# Patient Record
Sex: FEMALE | Race: WHITE | NOT HISPANIC OR LATINO | Employment: FULL TIME | URBAN - METROPOLITAN AREA
[De-identification: names, ages, dates, MRNs, and addresses within clinical notes are randomized per-mention and may not be internally consistent; named-entity substitution may affect disease eponyms.]

---

## 2018-04-20 LAB
ALBUMIN (HISTORICAL): < 0.7 MG/DL
ALBUMIN SERPL BCP-MCNC: 4.2 G/DL (ref 3.5–5.7)
ALP SERPL-CCNC: 73 IU/L (ref 40–150)
ALT SERPL W P-5'-P-CCNC: 25 IU/L (ref 0–50)
ANION GAP SERPL CALCULATED.3IONS-SCNC: 10.5 MM/L
AST SERPL W P-5'-P-CCNC: 17 U/L (ref 7–26)
BASOPHILS # BLD AUTO: 0 X3/UL (ref 0–0.3)
BASOPHILS # BLD AUTO: 0.6 % (ref 0–2)
BILIRUB SERPL-MCNC: 0.8 MG/DL (ref 0.3–1)
BUN SERPL-MCNC: 14 MG/DL (ref 7–25)
CALCIUM SERPL-MCNC: 9.3 MG/DL (ref 8.6–10.5)
CHLORIDE SERPL-SCNC: 108 MM/L (ref 98–107)
CHOLEST SERPL-MCNC: 129 MG/DL (ref 0–200)
CO2 SERPL-SCNC: 25 MM/L (ref 21–31)
CREAT SERPL-MCNC: 0.96 MG/DL (ref 0.6–1.2)
DEPRECATED RDW RBC AUTO: 12.8 % (ref 11.5–14.5)
EGFR (HISTORICAL): > 60 GFR
EGFR AFRICAN AMERICAN (HISTORICAL): > 60 GFR
EOSINOPHIL # BLD AUTO: 0.1 X3/UL (ref 0–0.5)
EOSINOPHIL NFR BLD AUTO: 1.9 % (ref 0–5)
EST. AVERAGE GLUCOSE BLD GHB EST-MCNC: 133 MG/DL
GLUCOSE (HISTORICAL): 107 MG/DL (ref 65–99)
HBA1C MFR BLD HPLC: 6.3 % (ref 4–6.2)
HCT VFR BLD AUTO: 41.9 % (ref 37–47)
HDLC SERPL-MCNC: 45 MG/DL (ref 40–60)
HGB BLD-MCNC: 13.9 G/DL (ref 12–16)
LDLC SERPL CALC-MCNC: 73.4 MG/DL (ref 75–193)
LYMPHOCYTES # BLD AUTO: 1.4 X3/UL (ref 1.2–4.2)
LYMPHOCYTES NFR BLD AUTO: 20.5 % (ref 20.5–51.1)
MCH RBC QN AUTO: 29.3 PG (ref 26–34)
MCHC RBC AUTO-ENTMCNC: 33.1 G/DL (ref 31–36)
MCV RBC AUTO: 88.3 FL (ref 81–99)
MONOCYTES # BLD AUTO: 0.4 X3/UL (ref 0–1)
MONOCYTES NFR BLD AUTO: 5.8 % (ref 1.7–12)
NEUTROPHILS # BLD AUTO: 4.7 X3/UL (ref 1.4–6.5)
NEUTS SEG NFR BLD AUTO: 71.2 % (ref 42.2–75.2)
OSMOLALITY, SERUM (HISTORICAL): 278 MOSM (ref 262–291)
PLATELET # BLD AUTO: 257 X3/UL (ref 130–400)
PMV BLD AUTO: 8.5 FL (ref 8.6–11.7)
POTASSIUM SERPL-SCNC: 4.5 MM/L (ref 3.5–5.5)
RBC # BLD AUTO: 4.74 X6/UL (ref 3.9–5.2)
SODIUM SERPL-SCNC: 139 MM/L (ref 134–143)
TOTAL PROTEIN (HISTORICAL): 6.7 G/DL (ref 6.4–8.9)
TRIGL SERPL-MCNC: 53 MG/DL (ref 44–166)
TSH SERPL DL<=0.05 MIU/L-ACNC: 2.39 UIU/M (ref 0.45–5.33)
VLDL CHOLESTEROL (HISTORICAL): 11 MG/DL (ref 5–51)
WBC # BLD AUTO: 6.6 X3/UL (ref 4.8–10.8)

## 2022-03-16 ENCOUNTER — APPOINTMENT (EMERGENCY)
Dept: CT IMAGING | Facility: HOSPITAL | Age: 37
DRG: 189 | End: 2022-03-16
Payer: OTHER MISCELLANEOUS

## 2022-03-16 ENCOUNTER — HOSPITAL ENCOUNTER (INPATIENT)
Facility: HOSPITAL | Age: 37
LOS: 1 days | Discharge: LEFT BEFORE DISCHARGE | DRG: 189 | End: 2022-03-16
Attending: EMERGENCY MEDICINE | Admitting: SURGERY
Payer: OTHER MISCELLANEOUS

## 2022-03-16 VITALS
RESPIRATION RATE: 16 BRPM | OXYGEN SATURATION: 98 % | DIASTOLIC BLOOD PRESSURE: 74 MMHG | SYSTOLIC BLOOD PRESSURE: 127 MMHG | HEART RATE: 86 BPM | TEMPERATURE: 97.3 F

## 2022-03-16 DIAGNOSIS — K43.9 VENTRAL HERNIA: ICD-10-CM

## 2022-03-16 DIAGNOSIS — R10.9 ABDOMINAL PAIN: Primary | ICD-10-CM

## 2022-03-16 DIAGNOSIS — K43.9 VENTRAL HERNIA WITHOUT OBSTRUCTION OR GANGRENE: ICD-10-CM

## 2022-03-16 LAB
ALBUMIN SERPL BCP-MCNC: 4.2 G/DL (ref 3.5–5)
ALP SERPL-CCNC: 86 U/L (ref 34–104)
ALT SERPL W P-5'-P-CCNC: 29 U/L (ref 7–52)
ANION GAP SERPL CALCULATED.3IONS-SCNC: 5 MMOL/L (ref 4–13)
AST SERPL W P-5'-P-CCNC: 20 U/L (ref 13–39)
BASOPHILS # BLD AUTO: 0.04 THOUSANDS/ΜL (ref 0–0.1)
BASOPHILS NFR BLD AUTO: 0 % (ref 0–1)
BILIRUB SERPL-MCNC: 0.93 MG/DL (ref 0.2–1)
BILIRUB UR QL STRIP: NEGATIVE
BUN SERPL-MCNC: 13 MG/DL (ref 5–25)
CALCIUM SERPL-MCNC: 9.3 MG/DL (ref 8.4–10.2)
CHLORIDE SERPL-SCNC: 102 MMOL/L (ref 96–108)
CLARITY UR: CLEAR
CO2 SERPL-SCNC: 32 MMOL/L (ref 21–32)
COLOR UR: YELLOW
CREAT SERPL-MCNC: 1.01 MG/DL (ref 0.6–1.3)
EOSINOPHIL # BLD AUTO: 0.22 THOUSAND/ΜL (ref 0–0.61)
EOSINOPHIL NFR BLD AUTO: 2 % (ref 0–6)
ERYTHROCYTE [DISTWIDTH] IN BLOOD BY AUTOMATED COUNT: 11.7 % (ref 11.6–15.1)
GFR SERPL CREATININE-BSD FRML MDRD: 70 ML/MIN/1.73SQ M
GLUCOSE SERPL-MCNC: 96 MG/DL (ref 65–140)
GLUCOSE UR STRIP-MCNC: NEGATIVE MG/DL
HCG SERPL QL: NEGATIVE
HCT VFR BLD AUTO: 42.2 % (ref 34.8–46.1)
HGB BLD-MCNC: 13.9 G/DL (ref 11.5–15.4)
HGB UR QL STRIP.AUTO: NEGATIVE
IMM GRANULOCYTES # BLD AUTO: 0.02 THOUSAND/UL (ref 0–0.2)
IMM GRANULOCYTES NFR BLD AUTO: 0 % (ref 0–2)
KETONES UR STRIP-MCNC: NEGATIVE MG/DL
LEUKOCYTE ESTERASE UR QL STRIP: NEGATIVE
LIPASE SERPL-CCNC: 11 U/L (ref 11–82)
LYMPHOCYTES # BLD AUTO: 1.82 THOUSANDS/ΜL (ref 0.6–4.47)
LYMPHOCYTES NFR BLD AUTO: 19 % (ref 14–44)
MCH RBC QN AUTO: 29.4 PG (ref 26.8–34.3)
MCHC RBC AUTO-ENTMCNC: 32.9 G/DL (ref 31.4–37.4)
MCV RBC AUTO: 89 FL (ref 82–98)
MONOCYTES # BLD AUTO: 0.64 THOUSAND/ΜL (ref 0.17–1.22)
MONOCYTES NFR BLD AUTO: 7 % (ref 4–12)
NEUTROPHILS # BLD AUTO: 6.68 THOUSANDS/ΜL (ref 1.85–7.62)
NEUTS SEG NFR BLD AUTO: 72 % (ref 43–75)
NITRITE UR QL STRIP: NEGATIVE
NRBC BLD AUTO-RTO: 0 /100 WBCS
PH UR STRIP.AUTO: 7 [PH]
PLATELET # BLD AUTO: 217 THOUSANDS/UL (ref 149–390)
PMV BLD AUTO: 9.3 FL (ref 8.9–12.7)
POTASSIUM SERPL-SCNC: 3.5 MMOL/L (ref 3.5–5.3)
PROT SERPL-MCNC: 6.7 G/DL (ref 6.4–8.4)
PROT UR STRIP-MCNC: NEGATIVE MG/DL
RBC # BLD AUTO: 4.72 MILLION/UL (ref 3.81–5.12)
SODIUM SERPL-SCNC: 139 MMOL/L (ref 135–147)
SP GR UR STRIP.AUTO: 1.02 (ref 1–1.03)
UROBILINOGEN UR QL STRIP.AUTO: 0.2 E.U./DL
WBC # BLD AUTO: 9.42 THOUSAND/UL (ref 4.31–10.16)

## 2022-03-16 PROCEDURE — 74177 CT ABD & PELVIS W/CONTRAST: CPT

## 2022-03-16 PROCEDURE — 81003 URINALYSIS AUTO W/O SCOPE: CPT | Performed by: EMERGENCY MEDICINE

## 2022-03-16 PROCEDURE — G1004 CDSM NDSC: HCPCS

## 2022-03-16 PROCEDURE — 84703 CHORIONIC GONADOTROPIN ASSAY: CPT | Performed by: EMERGENCY MEDICINE

## 2022-03-16 PROCEDURE — 99283 EMERGENCY DEPT VISIT LOW MDM: CPT

## 2022-03-16 PROCEDURE — 99285 EMERGENCY DEPT VISIT HI MDM: CPT | Performed by: EMERGENCY MEDICINE

## 2022-03-16 PROCEDURE — 85025 COMPLETE CBC W/AUTO DIFF WBC: CPT | Performed by: EMERGENCY MEDICINE

## 2022-03-16 PROCEDURE — 36415 COLL VENOUS BLD VENIPUNCTURE: CPT | Performed by: EMERGENCY MEDICINE

## 2022-03-16 PROCEDURE — 83690 ASSAY OF LIPASE: CPT | Performed by: EMERGENCY MEDICINE

## 2022-03-16 PROCEDURE — 96374 THER/PROPH/DIAG INJ IV PUSH: CPT

## 2022-03-16 PROCEDURE — 99285 EMERGENCY DEPT VISIT HI MDM: CPT

## 2022-03-16 PROCEDURE — 80053 COMPREHEN METABOLIC PANEL: CPT | Performed by: EMERGENCY MEDICINE

## 2022-03-16 RX ORDER — ONDANSETRON 2 MG/ML
4 INJECTION INTRAMUSCULAR; INTRAVENOUS EVERY 6 HOURS PRN
Status: CANCELLED | OUTPATIENT
Start: 2022-03-16

## 2022-03-16 RX ORDER — ACETAMINOPHEN 325 MG/1
650 TABLET ORAL EVERY 6 HOURS PRN
Status: CANCELLED | OUTPATIENT
Start: 2022-03-16

## 2022-03-16 RX ORDER — HEPARIN SODIUM 5000 [USP'U]/ML
5000 INJECTION, SOLUTION INTRAVENOUS; SUBCUTANEOUS EVERY 8 HOURS SCHEDULED
Status: CANCELLED | OUTPATIENT
Start: 2022-03-16

## 2022-03-16 RX ORDER — NICOTINE 21 MG/24HR
1 PATCH, TRANSDERMAL 24 HOURS TRANSDERMAL DAILY
Status: CANCELLED | OUTPATIENT
Start: 2022-03-16

## 2022-03-16 RX ORDER — KETOROLAC TROMETHAMINE 30 MG/ML
15 INJECTION, SOLUTION INTRAMUSCULAR; INTRAVENOUS EVERY 6 HOURS SCHEDULED
Status: CANCELLED | OUTPATIENT
Start: 2022-03-16 | End: 2022-03-21

## 2022-03-16 RX ORDER — SODIUM CHLORIDE, SODIUM LACTATE, POTASSIUM CHLORIDE, CALCIUM CHLORIDE 600; 310; 30; 20 MG/100ML; MG/100ML; MG/100ML; MG/100ML
125 INJECTION, SOLUTION INTRAVENOUS CONTINUOUS
Status: CANCELLED | OUTPATIENT
Start: 2022-03-16

## 2022-03-16 RX ADMIN — MORPHINE SULFATE 2 MG: 2 INJECTION, SOLUTION INTRAMUSCULAR; INTRAVENOUS at 08:20

## 2022-03-16 RX ADMIN — IOHEXOL 100 ML: 350 INJECTION, SOLUTION INTRAVENOUS at 06:33

## 2022-03-16 NOTE — ED PROVIDER NOTES
History  Chief Complaint   Patient presents with    Abdominal Pain     Pt states she pulled her stomach lifting an object and feels pain mid epigastric region  42YEAR-OLD FEMALE  PMH: none    PATIENT IS HERE FOR PERIUMBILICAL ABDOMINAL PAIN    STATES THIS STARTED YESTERDAY AFTER HEAVY LIFITNG  PT CONCERNED IT COULD BE A HERNIA    PAIN IS NOW  RATED 8/10  DESCRIBED AS SHARP    ASSOCIATED SYMPTOMS:  URINARY  SYMPTOMS: THERE IS NO DYSURIA, NO HEMATURIA, NO FREQUENCY  SHE DENIES NAUSEA, DENIES ANY VOMITING  DENIES FEVERS, CHILLS    DENIES LOOSE STOOLS, NO DIARRHEA, NO BLOODY STOOLS - NOT BLACK OR BLOODY    NO VAGINAL BLEEDING OR DISCHARGE    ALLEVIATING OR EXACERBATING FACTORS:  UNCERTAIN    INTERVENTIONS:   TYLENOL        History provided by:  Patient  Abdominal Pain  Pain location:  Periumbilical  Pain quality: sharp    Pain radiates to:  Does not radiate  Pain severity:  Severe  Onset quality:  Gradual  Chronicity:  New  Relieved by:  Nothing  Worsened by:  Nothing  Ineffective treatments:  None tried  Associated symptoms: no chest pain, no chills, no constipation, no cough, no dysuria, no fatigue, no fever, no hematemesis, no hematochezia, no hematuria, no melena, no nausea, no shortness of breath, no vaginal bleeding, no vaginal discharge and no vomiting        None       History reviewed  No pertinent past medical history  History reviewed  No pertinent surgical history  History reviewed  No pertinent family history  I have reviewed and agree with the history as documented  E-Cigarette/Vaping     E-Cigarette/Vaping Substances     Social History     Tobacco Use    Smoking status: Current Every Day Smoker     Packs/day: 0 50    Smokeless tobacco: Never Used   Substance Use Topics    Alcohol use: Never    Drug use: Not Currently       Review of Systems   Constitutional: Negative for chills, diaphoresis, fatigue and fever     Respiratory: Negative for cough, shortness of breath, wheezing and stridor  Cardiovascular: Negative for chest pain, palpitations and leg swelling  Gastrointestinal: Positive for abdominal pain  Negative for anal bleeding, blood in stool, constipation, hematemesis, hematochezia, melena, nausea and vomiting  Genitourinary: Negative for difficulty urinating, dysuria, flank pain, frequency, hematuria, pelvic pain, vaginal bleeding and vaginal discharge  Musculoskeletal: Negative for arthralgias, back pain, gait problem, joint swelling, myalgias, neck pain and neck stiffness  Skin: Negative for rash and wound  Neurological: Negative for dizziness, light-headedness and headaches  All other systems reviewed and are negative  Physical Exam  Physical Exam  Constitutional:       General: She is not in acute distress  Appearance: She is well-developed  She is not ill-appearing, toxic-appearing or diaphoretic  HENT:      Head: Normocephalic and atraumatic  Nose: Nose normal       Mouth/Throat:      Pharynx: No oropharyngeal exudate  Eyes:      General: No scleral icterus  Right eye: No discharge  Left eye: No discharge  Conjunctiva/sclera: Conjunctivae normal       Pupils: Pupils are equal, round, and reactive to light  Neck:      Vascular: No JVD  Trachea: No tracheal deviation  Cardiovascular:      Rate and Rhythm: Normal rate and regular rhythm  Heart sounds: Normal heart sounds  No murmur heard  No friction rub  No gallop  Pulmonary:      Effort: Pulmonary effort is normal  No respiratory distress  Breath sounds: Normal breath sounds  No stridor  No wheezing or rales  Chest:      Chest wall: No tenderness  Abdominal:      General: Bowel sounds are normal  There is no distension  Palpations: Abdomen is soft  There is no mass  Tenderness: There is abdominal tenderness in the periumbilical area  There is no guarding or rebound  Hernia: No hernia is present     Musculoskeletal:         General: No tenderness or deformity  Normal range of motion  Cervical back: Normal range of motion and neck supple  Lymphadenopathy:      Cervical: No cervical adenopathy  Skin:     General: Skin is warm  Capillary Refill: Capillary refill takes less than 2 seconds  Coloration: Skin is not pale  Findings: No erythema or rash  Neurological:      Mental Status: She is alert and oriented to person, place, and time  Cranial Nerves: No cranial nerve deficit  Sensory: No sensory deficit  Motor: No abnormal muscle tone  Coordination: Coordination normal    Psychiatric:         Behavior: Behavior normal          Thought Content:  Thought content normal          Judgment: Judgment normal          Vital Signs  ED Triage Vitals [03/16/22 0431]   Temperature Pulse Respirations Blood Pressure SpO2   (!) 97 3 °F (36 3 °C) 98 18 151/86 100 %      Temp Source Heart Rate Source Patient Position - Orthostatic VS BP Location FiO2 (%)   Temporal Monitor Sitting Right arm --      Pain Score       8           Vitals:    03/16/22 0431 03/16/22 0454   BP: 151/86 127/74   Pulse: 98 86   Patient Position - Orthostatic VS: Sitting Lying         Visual Acuity      ED Medications  Medications   iohexol (OMNIPAQUE) 350 MG/ML injection (SINGLE-DOSE) 100 mL (100 mL Intravenous Given 3/16/22 8745)       Diagnostic Studies  Results Reviewed     Procedure Component Value Units Date/Time    hCG, qualitative pregnancy [[id number]]  (Normal) Collected: 03/16/22 0451    Lab Status: Final result Specimen: Blood from Arm, Right Updated: 03/16/22 0542     Preg, Serum Negative    UA w Reflex to Microscopic w Reflex to Culture [[id number]]  (Normal) Collected: 03/16/22 0457    Lab Status: Final result Specimen: Urine, Clean Catch Updated: 03/16/22 0528     Color, UA Yellow     Clarity, UA Clear     Specific [place], UA 1 020     pH, UA 7 0     Leukocytes, UA Negative     Nitrite, UA Negative     Protein, UA Negative mg/dl Glucose, UA Negative mg/dl      Ketones, UA Negative mg/dl      Urobilinogen, UA 0 2 E U /dl      Bilirubin, UA Negative     Blood, UA Negative    CMP [[id number]] Collected: 03/16/22 0451    Lab Status: Final result Specimen: Blood from Arm, Right Updated: 03/16/22 0527     Sodium 139 mmol/L      Potassium 3 5 mmol/L      Chloride 102 mmol/L      CO2 32 mmol/L      ANION GAP 5 mmol/L      BUN 13 mg/dL      Creatinine 1 01 mg/dL      Glucose 96 mg/dL      Calcium 9 3 mg/dL      AST 20 U/L      ALT 29 U/L      Alkaline Phosphatase 86 U/L      Total Protein 6 7 g/dL      Albumin 4 2 g/dL      Total Bilirubin 0 93 mg/dL      eGFR 70 ml/min/1 73sq m     Narrative:      National Kidney Disease Foundation guidelines for Chronic Kidney Disease (CKD):     Stage 1 with normal or high GFR (GFR > 90 mL/min/1 73 square meters)    Stage 2 Mild CKD (GFR = 60-89 mL/min/1 73 square meters)    Stage 3A Moderate CKD (GFR = 45-59 mL/min/1 73 square meters)    Stage 3B Moderate CKD (GFR = 30-44 mL/min/1 73 square meters)    Stage 4 Severe CKD (GFR = 15-29 mL/min/1 73 square meters)    Stage 5 End Stage CKD (GFR <15 mL/min/1 73 square meters)  Note: GFR calculation is accurate only with a steady state creatinine    Lipase [[id number]]  (Normal) Collected: 03/16/22 0451    Lab Status: Final result Specimen: Blood from Arm, Right Updated: 03/16/22 0527     Lipase 11 u/L     CBC and differential [[id number]] Collected: 03/16/22 0451    Lab Status: Final result Specimen: Blood from Arm, Right Updated: 03/16/22 0504     WBC 9 42 Thousand/uL      RBC 4 72 Million/uL      Hemoglobin 13 9 g/dL      Hematocrit 42 2 %      MCV 89 fL      MCH 29 4 pg      MCHC 32 9 g/dL      RDW 11 7 %      MPV 9 3 fL      Platelets 747 Thousands/uL      nRBC 0 /100 WBCs      Neutrophils Relative 72 %      Immat GRANS % 0 %      Lymphocytes Relative 19 %      Monocytes Relative 7 %      Eosinophils Relative 2 %      Basophils Relative 0 %      Neutrophils Absolute 6 68 Thousands/µL      Immature Grans Absolute 0 02 Thousand/uL      Lymphocytes Absolute 1 82 Thousands/µL      Monocytes Absolute 0 64 Thousand/µL      Eosinophils Absolute 0 22 Thousand/µL      Basophils Absolute 0 04 Thousands/µL                  CT abdomen pelvis with contrast    (Results Pending)              Procedures  Procedures         ED Course  ED Course as of 03/16/22 0657   Wed Mar 16, 2022   0455 Pt seen and evaluated  Non toxic  Here with periumbilical pain  Possible hernia, though not clinically detected     0615 CMP   0616 Lipase: 11   0616 PREGNANCY, SERUM: Negative   0616 CBC and differential   0616 UA w Reflex to Microscopic w Reflex to Culture   0642 Pt stable and comfortable  Does not need more medications   She understands results of work up    02) [phone] Sign out:     Dw Dr Prabhjot Toledo  Will f/u on CT ab, and assist w/ disposition                                              MDM    Disposition  Final diagnoses:   Abdominal pain     Time reflects when diagnosis was documented in both MDM as applicable and the Disposition within this note     Time User Action Codes Description Comment    3/16/2022  6:43 AM Darshan Bryson Add [R10 9] Abdominal pain       ED Disposition     None      Follow-up Information     Follow up With Specialties Details Why Contact Info    Katerina Precise, DO Family Medicine Call today  [address] [facility]  [phone]            Patient's Medications    No medications on file       No discharge procedures on file      PDMP Review     None          ED Provider  Electronically Signed by           Kim Coreas MD  03/16/22 9002

## 2022-03-16 NOTE — ED NOTES
States pain superaumbical pain and tenderness after heavy lifting     Leatha Easton RN  03/16/22 7468

## 2022-03-16 NOTE — ED NOTES
Patient came into [facility] and stated to "get this fucking IV out I am leaving " PA notified   IV removed and AMA paperwork given to patient by surgical PA     Robin Cheung RN  03/16/22 0525

## 2022-03-16 NOTE — UTILIZATION REVIEW
Initial Clinical Review    Admission: Date/Time/Statement:   Admission Orders (From admission, onward)     Ordered        03/16/22 0912  Inpatient Admission  Once                      Orders Placed This Encounter   Procedures    Inpatient Admission     Standing Status:   Standing     Number of Occurrences:   1     Order Specific Question:   Level of Care     Answer:   Med Surg [16]     Order Specific Question:   Estimated length of stay     Answer:   More than 2 Midnights     Order Specific Question:   Certification     Answer:   I certify that inpatient services are medically necessary for this patient for a duration of greater than two midnights  See H&P and MD Progress Notes for additional information about the patient's course of treatment  ED Arrival Information     Expected Arrival Acuity    - 3/16/2022 04:19 Urgent         Means of arrival Escorted by Service Admission type    Walk-In Family Member Surgery-General Urgent         Arrival complaint    abdominal pain        Chief Complaint   Patient presents with    Abdominal Pain     Pt states she pulled her stomach lifting an object and feels pain mid epigastric region  Initial Presentation: 43 y/o female with PMH of HTN and DM presents to [address] [place] [address] ED via personal vehicle with c/o acute epigastric/periumbilical abd pain after lifting crates at work yesterday  Pain is sharp and worse w/ movement, no relief w/ Tylenol  Midline hernia revealed on CT AP  Admit Inpatient med surg, pt refusing sx performed today secondary to NPO status until sx, offered sx for tomorrow so pt may eat but pt does not want to stay overnight for procedure  3/16 Per RN Note:  Patient came into hallway and stated to "get this fucking IV out I am leaving " PA notified  IV removed and AMA paperwork given to patient by surgical PA      ED Triage Vitals [03/16/22 0431]   Temperature Pulse Respirations Blood Pressure SpO2   (!) 97 3 °F (36 3 °C) 98 18 151/86 100 %      Temp Source Heart Rate Source Patient Position - Orthostatic VS BP Location FiO2 (%)   Temporal Monitor Sitting Right arm --      Pain Score       8          Wt Readings from Last 1 Encounters:   No data found for Wt     Additional Vital Signs:   Date/Time Temp Pulse Resp BP SpO2 O2 Device Patient Position - Orthostatic VS   03/16/22 0454 -- 86 16 127/74 98 % None (Room air) Lying       Pertinent Labs/Diagnostic Test Results:   CT abdomen pelvis with contrast   Final Result by Arpita Chance MD (03/16 0544)      Midline ventral anterior upper abdominal wall hernia, as described above  Please see discussion  Clinical correlation is recommended           Results from last 7 days   Lab Units 03/16/22  0451   WBC Thousand/uL 9 42   HEMOGLOBIN g/dL 13 9   HEMATOCRIT % 42 2   PLATELETS Thousands/uL 217   NEUTROS ABS Thousands/µL 6 68     Results from last 7 days   Lab Units 03/16/22  0451   SODIUM mmol/L 139   POTASSIUM mmol/L 3 5   CHLORIDE mmol/L 102   CO2 mmol/L 32   ANION GAP mmol/L 5   BUN mg/dL 13   CREATININE mg/dL 1 01   EGFR ml/min/1 73sq m 70   CALCIUM mg/dL 9 3     Results from last 7 days   Lab Units 03/16/22  0451   AST U/L 20   ALT U/L 29   ALK PHOS U/L 86   TOTAL PROTEIN g/dL 6 7   ALBUMIN g/dL 4 2   TOTAL BILIRUBIN mg/dL 0 93     Results from last 7 days   Lab Units 03/16/22  0451   GLUCOSE RANDOM mg/dL 96     Results from last 7 days   Lab Units 03/16/22  0451   LIPASE u/L 11     Results from last 7 days   Lab Units 03/16/22  0457   CLARITY UA  Clear   COLOR UA  Yellow   SPEC GRAV UA  1 020   PH UA  7 0   GLUCOSE UA mg/dl Negative   KETONES UA mg/dl Negative   BLOOD UA  Negative   PROTEIN UA mg/dl Negative   NITRITE UA  Negative   BILIRUBIN UA  Negative   UROBILINOGEN UA E U /dl 0 2   LEUKOCYTES UA  Negative     ED Treatment:   Medication Administration from 03/16/2022 0419 to 03/16/2022 1124       Date/Time Order Dose Route Action     03/16/2022 0633 iohexol (OMNIPAQUE) 350 MG/ML injection (SINGLE-DOSE) 100 mL 100 mL Intravenous Given     03/16/2022 0820 morphine injection 2 mg 2 mg Intravenous Given        History reviewed  No pertinent past medical history  Present on Admission:  **None**      Admitting Diagnosis: Abdominal pain [R10 9]  Age/Sex: 42 y o  female  Admission Orders:    Network Utilization Review Department  ATTENTION: Please call with any questions or concerns to [phone] and carefully listen to the prompts so that you are directed to the right person  All voicemails are confidential   Khris Sommers all requests for admission clinical reviews, approved or denied determinations and any other requests to dedicated fax number below belonging to the campus where the patient is receiving treatment   List of dedicated fax numbers for the Facilities:  [address] DENIALS (Administrative/Medical Necessity) [phone]   [address] (Maternity/NICU/Pediatrics) [phone]   [address] [place] [address]  [address] [facility] [place] [facility] [place] [address] [address] [place] [address] [address] [facility] [phone]

## 2022-03-16 NOTE — DISCHARGE INSTRUCTIONS
RETURN IF WORSE IN ANY WAY:   WORSENING PAIN,   FEVER OR FLU LIKE SYMPTOMS,   OR NEW AND CONCERNING SYMPTOMS SIGNS OR SYMPTOMS      PLEASE CALL YOUR PRIMARY DOCTOR IN THE MORNING TO SET UP FOLLOW UP for TOMORROW or the Next day  PLEASE REVIEW THE WORK UP RESULTS WITH YOUR DOCTOR          PATIENT SURVEY:   Thank you for your visit today  Your satisfaction is very very important to us  Sanjuanita Lee for choosing [facility] for your ER care  If you get a survey in the mail please rate your service as VERY GOOD (other ratings lower than this are actually detrimental) - This helps our team to continue providing excellent care - Sanjuanita Lee

## 2022-03-16 NOTE — ED NOTES
Nurse went in to get patient set up for the OR  Patient explained that she will not go for surgery today  She will go tomorrow  [place] text sent to Dr Daly Maloney for clarification       Jenelle An RN  03/16/22 1002

## 2022-03-16 NOTE — ED CARE HANDOFF
Emergency Department Sign Out Note        Sign out and transfer of care from Dr Spencer Velazco  See Separate Emergency Department note  The patient, Anurag Kitcehn, was evaluated by the previous provider for abdominal pain  Workup Completed:  Patient is a 38-year-old female who presented to the emergency room complaining of abdominal pain  Patient was concerned that maybe she had a hernia due to heavy lifting recently  ED Course / Workup Pending (followup): Patient had an emergency department workup which consisted of pregnancy test, a CBC, CMP, and lipase  These were negative  CT scan is positive for a abdominal wall hernia superior to the umbilicus       " There is a midline ventral anterior upper abdominal wall hernia, located approximately 4 cm above the level of the umbilicus  This hernia measures approximately 5 5 x 2 4 x 5 2 cm  The mouth of this hernia measures   approximately 2 cm  The hernia contains fat and some mesenteric vessels  There is mild stranding of the fat within this hernia "                                  ED Course as of 03/16/22 1605   Wed Mar 16, 2022   5240 Case discussed with Dr Tora Hodgkin in surgery who will evaluate the patient  9984 Discussed with surgery in the emergency department, and patient will be admitted to go to the OR for hernia repair       Procedures  MDM        Disposition  Final diagnoses:   Abdominal pain   Ventral hernia     Time reflects when diagnosis was documented in both MDM as applicable and the Disposition within this note     Time User Action Codes Description Comment    3/16/2022  6:43 AM Darene Bud Add [R10 9] Abdominal pain     3/16/2022  8:57 AM Arby Session Add [K43 9] Ventral hernia without obstruction or gangrene     3/16/2022  9:12 AM Miriam Garcia Add [K43 9] Ventral hernia       ED Disposition     ED Disposition Condition Date/Time Comment    Admit Stable Wed Mar 16, 2022  9:12 AM Case was discussed with Dr Tora Hodgkin and the patient's admission status was agreed to be Admission Status: inpatient status to the service of Dr Leonela Howard up With Specialties Details Why [address] [facility] DO [place] [facility] Call today  [address]      Drea Flores MD General Surgery Follow up  [address] [facility] [address]  [phone]          There are no discharge medications for this patient      Outpatient Discharge Orders   Call provider for:  persistent nausea or vomiting     Call provider for:  severe uncontrolled pain     Follow-up with surgeon in 1-2 weeks          ED Provider  Electronically Signed by     Brigido Kim DO  03/16/22 8672

## 2022-03-16 NOTE — H&P
H&P - General Surgery   Sally De Los Santos 42 y o  female MRN: [id number]  Unit/Bed#: ED 26 Encounter: [id number]    Assessment:  42 y o  female with midline ventral hernia   AVSS  WBC 9 42  HGB 13 9  CR 1 01, baseline 0 96  CT AP 03/16/2022 with evidence of midline ventral anterior upper abdominal wall hernia measuring approximately 5 5 x 2 4 x 5 2 cm containing fat and some mesenteric vessels  Patient with continued periumbilical/epigastric abdominal pain exacerbated with movement    Plan:  Patient is refusing to have surgery performed today secondary to NPO status until surgery, alternative was offered for surgery tomorrow so that patient may eat but patient does not wish to stay overnight for procedure tomorrow  Singing out AMA, all risk discussed with patient  Discussed with Dr Concepcion Arreguin    History of Present Illness   Chief Complaint: epigastric/periumbilical abdominal pain     HPI:  Sally De Los Santos is a 42 y o  female with past medical history significant for previous diagnoses of hypertension and diabetes not currently on medicaitons who initially presented to [facility] ED on 03/16/2022 with complaints of acute onset epigastric/periumbilical abdominal pain after lifting crates at work yesterday  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 but upon lifting crates yesterday had acute onset periumbilical abdominal pain that has been constant since event  Pain is sharp and exacerbated with movement  Patient reports taking Tylenol at home with no relief  Denies fever, chills  Denies chest pain, palpitations, shortness of breath  Denies dysuria, urgency, frequency  Denies nausea, vomiting  Admits to passing some flatus and last bowel movement was 2 days ago  Past surgical history significant for cholecystectomy performed in [address] approximately 6 years ago and tonsillectomy  Denies reaction to anesthesia       Review of Systems   Constitutional: Negative for activity change, appetite change, chills and fever  HENT: Negative  Respiratory: Negative for cough and shortness of breath  Cardiovascular: Negative for chest pain, palpitations and leg swelling  Gastrointestinal: Positive for abdominal pain (Epigastric, periumbilical)  Negative for abdominal distention, constipation, diarrhea, nausea and vomiting  Endocrine: Negative for polydipsia, polyphagia and polyuria  Genitourinary: Negative for difficulty urinating, dysuria, flank pain, frequency and urgency  Musculoskeletal: Negative for myalgias  Skin: Negative for color change, rash and wound  Neurological: Negative for dizziness, weakness, light-headedness, numbness and headaches  Historical Information   History reviewed  No pertinent past medical history  History reviewed  No pertinent surgical history  Social History   Social History     Substance and Sexual Activity   Alcohol Use Never     Social History     Substance and Sexual Activity   Drug Use Not Currently     E-Cigarette/Vaping     E-Cigarette/Vaping Substances     Social History     Tobacco Use   Smoking Status Current Every Day Smoker    Packs/day: 0 50   Smokeless Tobacco Never Used     Family History: History reviewed  No pertinent family history  Meds/Allergies   All current active meds have been reviewed and current meds:   No current facility-administered medications for this encounter       Allergies   Allergen Reactions    Codeine Itching       Objective   First Vitals:   Blood Pressure: 151/86 (03/16/22 0431)  Pulse: 98 (03/16/22 0431)  Temperature: (!) 97 3 °F (36 3 °C) (03/16/22 0431)  Temp Source: Temporal (03/16/22 0431)  Respirations: 18 (03/16/22 0431)  SpO2: 100 % (03/16/22 0431)    Current Vitals:   Blood Pressure: 127/74 (03/16/22 0454)  Pulse: 86 (03/16/22 0454)  Temperature: (!) 97 3 °F (36 3 °C) (03/16/22 0431)  Temp Source: Temporal (03/16/22 0431)  Respirations: 16 (03/16/22 0454)  SpO2: 98 % (03/16/22 0454)    No intake or output data in the 24 hours ending 03/16/22 0823    Invasive Devices  Report    Peripheral Intravenous Line            Peripheral IV 03/16/22 Antecubital <1 day                Physical Exam  Vitals and nursing note reviewed  Constitutional:       General: She is not in acute distress  Appearance: Normal appearance  She is obese  She is not ill-appearing or toxic-appearing  HENT:      Head: Normocephalic and atraumatic  Cardiovascular:      Rate and Rhythm: Normal rate and regular rhythm  Pulses: Normal pulses  Heart sounds: Normal heart sounds  No murmur heard  No friction rub  No gallop  Pulmonary:      Effort: Pulmonary effort is normal  No respiratory distress  Breath sounds: Normal breath sounds  No wheezing, rhonchi or rales  Abdominal:      General: Bowel sounds are normal  There is no distension  Palpations: Abdomen is soft  There is no mass  Tenderness: There is abdominal tenderness (Epigastric)  There is no guarding or rebound  Hernia: A hernia (Ventral, difficult to palpate through abdomen) is present  Musculoskeletal:         General: Normal range of motion  Skin:     General: Skin is warm and dry  Capillary Refill: Capillary refill takes less than 2 seconds  Coloration: Skin is not jaundiced  Findings: No bruising or erythema  Neurological:      General: No focal deficit present  Mental Status: She is alert and oriented to person, place, and time  Psychiatric:         Mood and Affect: Mood normal          Behavior: Behavior normal        Lab Results:   I have personally reviewed pertinent lab results      CBC:   Lab Results   Component Value Date    WBC 9 42 03/16/2022    HGB 13 9 03/16/2022    HCT 42 2 03/16/2022    MCV 89 03/16/2022     03/16/2022    MCH 29 4 03/16/2022    MCHC 32 9 03/16/2022    RDW 11 7 03/16/2022    MPV 9 3 03/16/2022    NRBC 0 03/16/2022     CMP:   Lab Results   Component Value Date    SODIUM 139 03/16/2022    K 3 5 03/16/2022     03/16/2022    CO2 32 03/16/2022    BUN 13 03/16/2022    CREATININE 1 01 03/16/2022    CALCIUM 9 3 03/16/2022    AST 20 03/16/2022    ALT 29 03/16/2022    ALKPHOS 86 03/16/2022    EGFR 70 03/16/2022     Urinalysis:   Lab Results   Component Value Date    COLORU Yellow 03/16/2022    CLARITYU Clear 03/16/2022    SPECGRAV 1 020 03/16/2022    PHUR 7 0 03/16/2022    LEUKOCYTESUR Negative 03/16/2022    NITRITE Negative 03/16/2022    GLUCOSEU Negative 03/16/2022    KETONESU Negative 03/16/2022    BILIRUBINUR Negative 03/16/2022    BLOODU Negative 03/16/2022     Lipase:   Lab Results   Component Value Date    LIPASE 11 03/16/2022     Imaging: I have personally reviewed pertinent reports  CT AP 03/16/2022:Midline ventral anterior upper abdominal wall hernia, as described above - There is a midline ventral anterior upper abdominal wall hernia, located approximately 4 cm above the level of the umbilicus  This hernia measures approximately 5 5 x 2 4 x 5 2 cm  The mouth of this hernia measures   approximately 2 cm  The hernia contains fat and some mesenteric vessels  There is mild stranding of the fat within this hernia  EKG, Pathology, and Other Studies: I have personally reviewed pertinent reports  Code Status: No Order  Advance Directive and Living Will:      Power of :    POLST:      Counseling / Coordination of Care  Total floor / unit time spent today 15 minutes  Greater than 50% of total time was spent with the patient and / or family counseling and / or coordination of care  A description of the counseling / coordination of care:  Reviewing pertinent diagnostic imaging and laboratory studies, evaluation of patient       Deon Lezama PA-C

## 2022-03-28 ENCOUNTER — CONSULT (OUTPATIENT)
Dept: SURGERY | Facility: CLINIC | Age: 37
End: 2022-03-28
Payer: OTHER MISCELLANEOUS

## 2022-03-28 VITALS
BODY MASS INDEX: 31.99 KG/M2 | HEART RATE: 74 BPM | SYSTOLIC BLOOD PRESSURE: 100 MMHG | WEIGHT: 216 LBS | DIASTOLIC BLOOD PRESSURE: 60 MMHG | OXYGEN SATURATION: 98 % | TEMPERATURE: 98.1 F | RESPIRATION RATE: 16 BRPM | HEIGHT: 69 IN

## 2022-03-28 DIAGNOSIS — K43.2 INCISIONAL HERNIA, WITHOUT OBSTRUCTION OR GANGRENE: Primary | ICD-10-CM

## 2022-03-28 PROCEDURE — 99204 OFFICE O/P NEW MOD 45 MIN: CPT | Performed by: PHYSICIAN ASSISTANT

## 2022-03-28 RX ORDER — CEFAZOLIN SODIUM 2 G/50ML
2000 SOLUTION INTRAVENOUS ONCE
Status: CANCELLED | OUTPATIENT
Start: 2022-04-21 | End: 2022-03-28

## 2022-03-28 RX ORDER — CHLORHEXIDINE GLUCONATE 4 G/100ML
SOLUTION TOPICAL DAILY PRN
Status: CANCELLED | OUTPATIENT
Start: 2022-03-28

## 2022-03-28 RX ORDER — SODIUM CHLORIDE, SODIUM LACTATE, POTASSIUM CHLORIDE, CALCIUM CHLORIDE 600; 310; 30; 20 MG/100ML; MG/100ML; MG/100ML; MG/100ML
125 INJECTION, SOLUTION INTRAVENOUS CONTINUOUS
Status: CANCELLED | OUTPATIENT
Start: 2022-03-28

## 2022-03-28 NOTE — H&P
H&P Exam - General Surgery   Britney Banuelos 42 y o  female MRN: [id number]   Encounter: [id number]    Assessment/Plan    Incisional hernia, without obstruction or gangrene  43 yo F without PMH presenting with acute ventral incisional hernia of supra-umbilical region  Previously diagnosed by CT in the [place] ER on 3/16  Reports ongoing pain, no N/V, no changes in bowel movements  On exam abdomen is flat and soft, there is a scar from a port site of the supra-umbilical region  Palpable incarcerated hernia of this area, exam limited by obesity  Diagnosis of incisional hernia explained and recommendation made for laparoscopic ventral incisional herniorrhaphy with mesh  Risks including anesthesia, bleeding, infection, bowel injury, recurrence reviewed and informed consent obtained to proceed  All questions answered, follow-up arranged  Patient advised to rest, can continue ibuprofen/acetaminophen for pain control  There are no diagnoses linked to this encounter  History of Present Illness   Chief Complaint   Patient presents with    Pre-op Exam     Venral hernia     Patient came in today for a ventral hernia that has for almost two weeks now  Patient states she has been suffering from a stabbing pain above her belly button with every movement she makes  She  switches with taking tylenol and IBU for the pain but those medication isn't helping with the pain at all  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  Patient has no complaints of nausea/vomiting, diarrhea, trouble eating or fevers/chills  LAURA Rich 43 yo F hx prior lap arcelia presents for evaluation of periumbilical pain and recently diagnosed ventral hernia  Reports developing acute pain while lifting/loading objects at work  Patient was evaluated in the ER and had CT confirming fat containing supra-umbilical hernia   Patient left due to scheduling conflicts and presents now for further treatment planning  Reports ongoing pain  No associated nausea or vomiting  No changes to bowel movements  Review of Systems    Historical Information   History reviewed  No pertinent past medical history  Past Surgical History:   Procedure Laterality Date    GALLBLADDER SURGERY      removed 7-8 years ago     TONSILLECTOMY       Social History   Social History     Substance and Sexual Activity   Alcohol Use Never     Social History     Substance and Sexual Activity   Drug Use Not Currently     Social History     Tobacco Use   Smoking Status Current Every Day Smoker    Packs/day: 0 50   Smokeless Tobacco Never Used     Family History   Problem Relation Age of Onset    No Known Problems Father     No Known Problems Brother     No Known Problems Brother        Meds/Allergies   No current outpatient medications on file  Allergies   Allergen Reactions    Codeine Itching       The following portions of the patient's history were reviewed and updated as appropriate: allergies, current medications, past family history, past medical history, past social history, past surgical history and problem list     Objective   Current Vitals:   Blood pressure 100/60, pulse 74, temperature 98 1 °F (36 7 °C), temperature source Temporal, resp  rate 16, height 5' 9" (1 753 m), weight 98 kg (216 lb), SpO2 98 %  Physical Exam  Vitals and nursing note reviewed  Constitutional:       General: She is not in acute distress  Appearance: She is well-developed  She is not diaphoretic  HENT:      Head: Normocephalic and atraumatic  Eyes:      Pupils: Pupils are equal, round, and reactive to light  Cardiovascular:      Rate and Rhythm: Normal rate and regular rhythm  Heart sounds: Normal heart sounds  No murmur heard  Pulmonary:      Effort: Pulmonary effort is normal  No respiratory distress  Breath sounds: Normal breath sounds  No wheezing     Abdominal:      General: Bowel sounds are normal  There is no distension  Palpations: Abdomen is soft  Tenderness: There is abdominal tenderness  There is no rebound  Comments: Supraumbilical vertical port site scar  Deep to this is a ventral hernia with chronic incarceration  moderately TTP  No peritoneal signs  Musculoskeletal:         General: Normal range of motion  Cervical back: Normal range of motion  Skin:     General: Skin is warm and dry  Findings: No rash  Neurological:      Mental Status: She is alert and oriented to person, place, and time     Psychiatric:         Behavior: Behavior normal          Signature:  Jose Francisco Colon PA-C  Date: 3/28/2022 Time: 11:22 AM

## 2022-03-28 NOTE — PROGRESS NOTES
Assessment/Plan:    No problem-specific Assessment & Plan notes found for this encounter  Subjective:      Patient ID: Martha Marin is a 42 y o  female  HPI    {Common ambulatory SmartLinks:08026}    Review of Systems      Objective: There were no vitals taken for this visit           Physical Exam

## 2022-03-28 NOTE — H&P (VIEW-ONLY)
H&P Exam - General Surgery   Ramiro Fournier 42 y o  female MRN: [id number]   Encounter: [id number]    Assessment/Plan    Incisional hernia, without obstruction or gangrene  43 yo F without PMH presenting with acute ventral incisional hernia of supra-umbilical region  Previously diagnosed by CT in the [place] ER on 3/16  Reports ongoing pain, no N/V, no changes in bowel movements  On exam abdomen is flat and soft, there is a scar from a port site of the supra-umbilical region  Palpable incarcerated hernia of this area, exam limited by obesity  Diagnosis of incisional hernia explained and recommendation made for laparoscopic ventral incisional herniorrhaphy with mesh  Risks including anesthesia, bleeding, infection, bowel injury, recurrence reviewed and informed consent obtained to proceed  All questions answered, follow-up arranged  Patient advised to rest, can continue ibuprofen/acetaminophen for pain control  There are no diagnoses linked to this encounter  History of Present Illness   Chief Complaint   Patient presents with    Pre-op Exam     Venral hernia     Patient came in today for a ventral hernia that has for almost two weeks now  Patient states she has been suffering from a stabbing pain above her belly button with every movement she makes  She  switches with taking tylenol and IBU for the pain but those medication isn't helping with the pain at all  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  Patient has no complaints of nausea/vomiting, diarrhea, trouble eating or fevers/chills  LAURA Rich 43 yo F hx prior lap arcelia presents for evaluation of periumbilical pain and recently diagnosed ventral hernia  Reports developing acute pain while lifting/loading objects at work  Patient was evaluated in the ER and had CT confirming fat containing supra-umbilical hernia   Patient left due to scheduling conflicts and presents now for further treatment planning  Reports ongoing pain  No associated nausea or vomiting  No changes to bowel movements  Review of Systems    Historical Information   History reviewed  No pertinent past medical history  Past Surgical History:   Procedure Laterality Date    GALLBLADDER SURGERY      removed 7-8 years ago     TONSILLECTOMY       Social History   Social History     Substance and Sexual Activity   Alcohol Use Never     Social History     Substance and Sexual Activity   Drug Use Not Currently     Social History     Tobacco Use   Smoking Status Current Every Day Smoker    Packs/day: 0 50   Smokeless Tobacco Never Used     Family History   Problem Relation Age of Onset    No Known Problems Father     No Known Problems Brother     No Known Problems Brother        Meds/Allergies   No current outpatient medications on file  Allergies   Allergen Reactions    Codeine Itching       The following portions of the patient's history were reviewed and updated as appropriate: allergies, current medications, past family history, past medical history, past social history, past surgical history and problem list     Objective   Current Vitals:   Blood pressure 100/60, pulse 74, temperature 98 1 °F (36 7 °C), temperature source Temporal, resp  rate 16, height 5' 9" (1 753 m), weight 98 kg (216 lb), SpO2 98 %  Physical Exam  Vitals and nursing note reviewed  Constitutional:       General: She is not in acute distress  Appearance: She is well-developed  She is not diaphoretic  HENT:      Head: Normocephalic and atraumatic  Eyes:      Pupils: Pupils are equal, round, and reactive to light  Cardiovascular:      Rate and Rhythm: Normal rate and regular rhythm  Heart sounds: Normal heart sounds  No murmur heard  Pulmonary:      Effort: Pulmonary effort is normal  No respiratory distress  Breath sounds: Normal breath sounds  No wheezing     Abdominal:      General: Bowel sounds are normal  There is no distension  Palpations: Abdomen is soft  Tenderness: There is abdominal tenderness  There is no rebound  Comments: Supraumbilical vertical port site scar  Deep to this is a ventral hernia with chronic incarceration  moderately TTP  No peritoneal signs  Musculoskeletal:         General: Normal range of motion  Cervical back: Normal range of motion  Skin:     General: Skin is warm and dry  Findings: No rash  Neurological:      Mental Status: She is alert and oriented to person, place, and time     Psychiatric:         Behavior: Behavior normal          Signature:  Jose rFancisco Colon PA-C  Date: 3/28/2022 Time: 11:22 AM

## 2022-03-28 NOTE — ASSESSMENT & PLAN NOTE
43 yo F without PMH presenting with acute ventral incisional hernia of supra-umbilical region  Previously diagnosed by CT in the [place] ER on 3/16  Reports ongoing pain, no N/V, no changes in bowel movements  On exam abdomen is flat and soft, there is a scar from a port site of the supra-umbilical region  Palpable incarcerated hernia of this area, exam limited by obesity  Diagnosis of incisional hernia explained and recommendation made for laparoscopic ventral incisional herniorrhaphy with mesh  Risks including anesthesia, bleeding, infection, bowel injury, recurrence reviewed and informed consent obtained to proceed  All questions answered, follow-up arranged  Patient advised to rest, can continue ibuprofen/acetaminophen for pain control

## 2022-03-30 ENCOUNTER — TELEPHONE (OUTPATIENT)
Dept: SURGERY | Facility: CLINIC | Age: 37
End: 2022-03-30

## 2022-03-30 NOTE — TELEPHONE ENCOUNTER
Tried to contact patient in regards to a date for surgery which will be Thursday, April 21,2022  But her voicemail was full

## 2022-03-31 NOTE — TELEPHONE ENCOUNTER
Attempted to contact the patient a second time in regards to a date for her surgery, but her voicemail is full

## 2022-03-31 NOTE — TELEPHONE ENCOUNTER
Left message for patient's father Lily Dumas to have have his daughter Kriss Ahr call out office  I stated that I was trying to get in contact with her in regards to setting up her surgery

## 2022-04-04 NOTE — TELEPHONE ENCOUNTER
Tried to contact the patient a third time in regards to a date for her hernia surgery and her mailbox is full

## 2022-04-05 ENCOUNTER — TELEPHONE (OUTPATIENT)
Dept: SURGERY | Facility: CLINIC | Age: 37
End: 2022-04-05

## 2022-04-05 NOTE — TELEPHONE ENCOUNTER
Leonela Murguia from [facility] called, requested office visit notes and also inquired on a date of surgery for patient  We told her that we have been trying to get in touch with the patient and she has not returned our calls  Leonela Notice got in touch with the patient and she called us  She is being scheduled for 04/21/2022 and I did explain to the patient how we have tried to reach her and also that we left a message with her father  She stated that she gets SPAM calls and doesn't answer her phone, I also told her that we were unable to leave messages and that maybe she should contact her phone carrier to see how to get our calls to go through to her phone  I also let her know to expect calls from the hospital prior to her surgery date that she will need to accept/answer  Office visit notes were faxed to [facility] @ [phone] along with patient's date of surgery and post op appointment information

## 2022-04-06 ENCOUNTER — TELEPHONE (OUTPATIENT)
Dept: SURGERY | Facility: CLINIC | Age: 37
End: 2022-04-06

## 2022-04-06 NOTE — TELEPHONE ENCOUNTER
Called billing to have patient's self pay payment of $ 30 00 refunded due to patient now being under workers comp  Spoke with Jimy Michel and a refund has been initiated as of today (04/06/2022)  Called patient and made her aware that a check will be mailed to her

## 2022-04-19 NOTE — PRE-PROCEDURE INSTRUCTIONS
No outpatient medications have been marked as taking for the 4/21/22 encounter [facility] Encounter)  Pt denies taking any medications including OTC, vitamins or supplements  Pt was instructed to not take any aspirin, NSAIDS, vitamins or supplements until after sx  Pt verb understanding  Pre-op instructions were rev with pt

## 2022-04-21 ENCOUNTER — HOSPITAL ENCOUNTER (OUTPATIENT)
Facility: HOSPITAL | Age: 37
Setting detail: OUTPATIENT SURGERY
Discharge: HOME | End: 2022-04-21
Attending: SURGERY | Admitting: SURGERY
Payer: OTHER MISCELLANEOUS

## 2022-04-21 ENCOUNTER — ANESTHESIA (OUTPATIENT)
Dept: PERIOP | Facility: HOSPITAL | Age: 37
End: 2022-04-21
Payer: OTHER MISCELLANEOUS

## 2022-04-21 ENCOUNTER — ANESTHESIA EVENT (OUTPATIENT)
Dept: PERIOP | Facility: HOSPITAL | Age: 37
End: 2022-04-21
Payer: OTHER MISCELLANEOUS

## 2022-04-21 VITALS
HEART RATE: 73 BPM | WEIGHT: 216 LBS | TEMPERATURE: 98.4 F | OXYGEN SATURATION: 98 % | RESPIRATION RATE: 16 BRPM | BODY MASS INDEX: 31.99 KG/M2 | DIASTOLIC BLOOD PRESSURE: 98 MMHG | SYSTOLIC BLOOD PRESSURE: 163 MMHG | HEIGHT: 69 IN

## 2022-04-21 DIAGNOSIS — K43.2 INCISIONAL HERNIA, WITHOUT OBSTRUCTION OR GANGRENE: Primary | ICD-10-CM

## 2022-04-21 LAB
EXT PREGNANCY TEST URINE: NEGATIVE
EXT. CONTROL: NORMAL

## 2022-04-21 PROCEDURE — 81025 URINE PREGNANCY TEST: CPT | Performed by: SURGERY

## 2022-04-21 PROCEDURE — C1781 MESH (IMPLANTABLE): HCPCS | Performed by: SURGERY

## 2022-04-21 PROCEDURE — 49655 PR LAP, INCISIONAL HERNIA REPAIR,INCARCERATED: CPT | Performed by: SURGERY

## 2022-04-21 PROCEDURE — 49655 PR LAP, INCISIONAL HERNIA REPAIR,INCARCERATED: CPT | Performed by: PHYSICIAN ASSISTANT

## 2022-04-21 DEVICE — FIXATION SECURESTRAP 5 MM ABSORB DISP: Type: IMPLANTABLE DEVICE | Site: ABDOMEN | Status: FUNCTIONAL

## 2022-04-21 DEVICE — VENTRALIGHT ST MESH WITH ECHO PS POSITIONING SYSTEM
Type: IMPLANTABLE DEVICE | Site: ABDOMEN | Status: FUNCTIONAL
Brand: VENTRALIGHT ST MESH WITH ECHO PS POSITIONING SYSTEM

## 2022-04-21 RX ORDER — FENTANYL CITRATE 50 UG/ML
INJECTION, SOLUTION INTRAMUSCULAR; INTRAVENOUS
Status: COMPLETED
Start: 2022-04-21 | End: 2022-04-21

## 2022-04-21 RX ORDER — ONDANSETRON 2 MG/ML
4 INJECTION INTRAMUSCULAR; INTRAVENOUS ONCE AS NEEDED
Status: DISCONTINUED | OUTPATIENT
Start: 2022-04-21 | End: 2022-04-21 | Stop reason: HOSPADM

## 2022-04-21 RX ORDER — CEFAZOLIN SODIUM 2 G/50ML
2000 SOLUTION INTRAVENOUS ONCE
Status: COMPLETED | OUTPATIENT
Start: 2022-04-21 | End: 2022-04-21

## 2022-04-21 RX ORDER — MAGNESIUM HYDROXIDE 1200 MG/15ML
LIQUID ORAL AS NEEDED
Status: DISCONTINUED | OUTPATIENT
Start: 2022-04-21 | End: 2022-04-21 | Stop reason: HOSPADM

## 2022-04-21 RX ORDER — ONDANSETRON 2 MG/ML
INJECTION INTRAMUSCULAR; INTRAVENOUS AS NEEDED
Status: DISCONTINUED | OUTPATIENT
Start: 2022-04-21 | End: 2022-04-21

## 2022-04-21 RX ORDER — PROPOFOL 10 MG/ML
INJECTION, EMULSION INTRAVENOUS AS NEEDED
Status: DISCONTINUED | OUTPATIENT
Start: 2022-04-21 | End: 2022-04-21

## 2022-04-21 RX ORDER — DEXAMETHASONE SODIUM PHOSPHATE 10 MG/ML
INJECTION, SOLUTION INTRAMUSCULAR; INTRAVENOUS AS NEEDED
Status: DISCONTINUED | OUTPATIENT
Start: 2022-04-21 | End: 2022-04-21

## 2022-04-21 RX ORDER — NEOSTIGMINE METHYLSULFATE 1 MG/ML
INJECTION INTRAVENOUS AS NEEDED
Status: DISCONTINUED | OUTPATIENT
Start: 2022-04-21 | End: 2022-04-21

## 2022-04-21 RX ORDER — ROCURONIUM BROMIDE 10 MG/ML
INJECTION, SOLUTION INTRAVENOUS AS NEEDED
Status: DISCONTINUED | OUTPATIENT
Start: 2022-04-21 | End: 2022-04-21

## 2022-04-21 RX ORDER — HYDROCODONE BITARTRATE AND ACETAMINOPHEN 5; 325 MG/1; MG/1
1 TABLET ORAL EVERY 6 HOURS PRN
Qty: 20 TABLET | Refills: 0 | Status: SHIPPED | OUTPATIENT
Start: 2022-04-21 | End: 2022-05-01

## 2022-04-21 RX ORDER — BUPIVACAINE HYDROCHLORIDE 5 MG/ML
INJECTION, SOLUTION PERINEURAL AS NEEDED
Status: DISCONTINUED | OUTPATIENT
Start: 2022-04-21 | End: 2022-04-21 | Stop reason: HOSPADM

## 2022-04-21 RX ORDER — MIDAZOLAM HYDROCHLORIDE 2 MG/2ML
INJECTION, SOLUTION INTRAMUSCULAR; INTRAVENOUS AS NEEDED
Status: DISCONTINUED | OUTPATIENT
Start: 2022-04-21 | End: 2022-04-21

## 2022-04-21 RX ORDER — DOCUSATE SODIUM 100 MG/1
100 CAPSULE, LIQUID FILLED ORAL 2 TIMES DAILY
Qty: 10 CAPSULE | Refills: 0 | Status: SHIPPED | OUTPATIENT
Start: 2022-04-21 | End: 2022-04-26

## 2022-04-21 RX ORDER — KETOROLAC TROMETHAMINE 30 MG/ML
INJECTION, SOLUTION INTRAMUSCULAR; INTRAVENOUS AS NEEDED
Status: DISCONTINUED | OUTPATIENT
Start: 2022-04-21 | End: 2022-04-21

## 2022-04-21 RX ORDER — FENTANYL CITRATE/PF 50 MCG/ML
50 SYRINGE (ML) INJECTION
Status: DISCONTINUED | OUTPATIENT
Start: 2022-04-21 | End: 2022-04-21 | Stop reason: HOSPADM

## 2022-04-21 RX ORDER — HYDROMORPHONE HCL/PF 1 MG/ML
SYRINGE (ML) INJECTION AS NEEDED
Status: DISCONTINUED | OUTPATIENT
Start: 2022-04-21 | End: 2022-04-21

## 2022-04-21 RX ORDER — FENTANYL CITRATE 50 UG/ML
INJECTION, SOLUTION INTRAMUSCULAR; INTRAVENOUS AS NEEDED
Status: DISCONTINUED | OUTPATIENT
Start: 2022-04-21 | End: 2022-04-21

## 2022-04-21 RX ORDER — CHLORHEXIDINE GLUCONATE 4 G/100ML
SOLUTION TOPICAL DAILY PRN
Status: DISCONTINUED | OUTPATIENT
Start: 2022-04-21 | End: 2022-04-21 | Stop reason: HOSPADM

## 2022-04-21 RX ORDER — LIDOCAINE HYDROCHLORIDE 10 MG/ML
INJECTION, SOLUTION EPIDURAL; INFILTRATION; INTRACAUDAL; PERINEURAL AS NEEDED
Status: DISCONTINUED | OUTPATIENT
Start: 2022-04-21 | End: 2022-04-21

## 2022-04-21 RX ORDER — HYDROMORPHONE HCL/PF 1 MG/ML
0.5 SYRINGE (ML) INJECTION EVERY 4 HOURS PRN
Status: DISCONTINUED | OUTPATIENT
Start: 2022-04-21 | End: 2022-04-21 | Stop reason: HOSPADM

## 2022-04-21 RX ORDER — OXYCODONE HYDROCHLORIDE 5 MG/1
5 TABLET ORAL EVERY 4 HOURS PRN
Status: DISCONTINUED | OUTPATIENT
Start: 2022-04-21 | End: 2022-04-21 | Stop reason: HOSPADM

## 2022-04-21 RX ORDER — IBUPROFEN 600 MG/1
600 TABLET ORAL EVERY 6 HOURS PRN
Qty: 15 TABLET | Refills: 0 | Status: SHIPPED | OUTPATIENT
Start: 2022-04-21

## 2022-04-21 RX ORDER — GLYCOPYRROLATE 0.2 MG/ML
INJECTION INTRAMUSCULAR; INTRAVENOUS AS NEEDED
Status: DISCONTINUED | OUTPATIENT
Start: 2022-04-21 | End: 2022-04-21

## 2022-04-21 RX ORDER — HYDROMORPHONE HCL/PF 1 MG/ML
0.2 SYRINGE (ML) INJECTION EVERY 4 HOURS PRN
Status: DISCONTINUED | OUTPATIENT
Start: 2022-04-21 | End: 2022-04-21 | Stop reason: HOSPADM

## 2022-04-21 RX ORDER — SODIUM CHLORIDE, SODIUM LACTATE, POTASSIUM CHLORIDE, CALCIUM CHLORIDE 600; 310; 30; 20 MG/100ML; MG/100ML; MG/100ML; MG/100ML
75 INJECTION, SOLUTION INTRAVENOUS CONTINUOUS
Status: DISCONTINUED | OUTPATIENT
Start: 2022-04-21 | End: 2022-04-21 | Stop reason: HOSPADM

## 2022-04-21 RX ORDER — DIPHENHYDRAMINE HCL 25 MG
25 TABLET ORAL EVERY 6 HOURS PRN
Status: DISCONTINUED | OUTPATIENT
Start: 2022-04-21 | End: 2022-04-21 | Stop reason: HOSPADM

## 2022-04-21 RX ORDER — HYDROMORPHONE HCL/PF 1 MG/ML
0.25 SYRINGE (ML) INJECTION
Status: DISCONTINUED | OUTPATIENT
Start: 2022-04-21 | End: 2022-04-21 | Stop reason: HOSPADM

## 2022-04-21 RX ORDER — ACETAMINOPHEN 325 MG/1
650 TABLET ORAL EVERY 6 HOURS SCHEDULED
Status: DISCONTINUED | OUTPATIENT
Start: 2022-04-21 | End: 2022-04-21 | Stop reason: HOSPADM

## 2022-04-21 RX ORDER — SODIUM CHLORIDE, SODIUM LACTATE, POTASSIUM CHLORIDE, CALCIUM CHLORIDE 600; 310; 30; 20 MG/100ML; MG/100ML; MG/100ML; MG/100ML
125 INJECTION, SOLUTION INTRAVENOUS CONTINUOUS
Status: DISCONTINUED | OUTPATIENT
Start: 2022-04-21 | End: 2022-04-21 | Stop reason: HOSPADM

## 2022-04-21 RX ORDER — PROMETHAZINE HYDROCHLORIDE 25 MG/ML
12.5 INJECTION, SOLUTION INTRAMUSCULAR; INTRAVENOUS
Status: DISCONTINUED | OUTPATIENT
Start: 2022-04-21 | End: 2022-04-21 | Stop reason: HOSPADM

## 2022-04-21 RX ADMIN — SODIUM CHLORIDE, SODIUM LACTATE, POTASSIUM CHLORIDE, AND CALCIUM CHLORIDE 125 ML/HR: .6; .31; .03; .02 INJECTION, SOLUTION INTRAVENOUS at 06:36

## 2022-04-21 RX ADMIN — ROCURONIUM BROMIDE 40 MG: 10 INJECTION INTRAVENOUS at 07:25

## 2022-04-21 RX ADMIN — FENTANYL CITRATE 50 MCG: 50 INJECTION, SOLUTION INTRAMUSCULAR; INTRAVENOUS at 09:17

## 2022-04-21 RX ADMIN — GLYCOPYRROLATE 0.4 MG: 0.2 INJECTION, SOLUTION INTRAMUSCULAR; INTRAVENOUS at 08:35

## 2022-04-21 RX ADMIN — DEXAMETHASONE SODIUM PHOSPHATE 10 MG: 10 INJECTION, SOLUTION INTRAMUSCULAR; INTRAVENOUS at 07:37

## 2022-04-21 RX ADMIN — ACETAMINOPHEN 650 MG: 325 TABLET ORAL at 11:17

## 2022-04-21 RX ADMIN — MIDAZOLAM HYDROCHLORIDE 2 MG: 1 INJECTION, SOLUTION INTRAMUSCULAR; INTRAVENOUS at 07:22

## 2022-04-21 RX ADMIN — CEFAZOLIN SODIUM 2000 MG: 2 SOLUTION INTRAVENOUS at 07:22

## 2022-04-21 RX ADMIN — OXYCODONE HYDROCHLORIDE 5 MG: 5 TABLET ORAL at 10:23

## 2022-04-21 RX ADMIN — HYDROMORPHONE HYDROCHLORIDE 0.25 MG: 1 INJECTION, SOLUTION INTRAMUSCULAR; INTRAVENOUS; SUBCUTANEOUS at 09:45

## 2022-04-21 RX ADMIN — HYDROMORPHONE HYDROCHLORIDE 0.5 MG: 1 INJECTION, SOLUTION INTRAMUSCULAR; INTRAVENOUS; SUBCUTANEOUS at 10:48

## 2022-04-21 RX ADMIN — HYDROMORPHONE HYDROCHLORIDE 0.25 MG: 1 INJECTION, SOLUTION INTRAMUSCULAR; INTRAVENOUS; SUBCUTANEOUS at 09:29

## 2022-04-21 RX ADMIN — ONDANSETRON 4 MG: 2 INJECTION INTRAMUSCULAR; INTRAVENOUS at 07:57

## 2022-04-21 RX ADMIN — HYDROMORPHONE HYDROCHLORIDE 0.5 MG: 1 INJECTION, SOLUTION INTRAMUSCULAR; INTRAVENOUS; SUBCUTANEOUS at 07:48

## 2022-04-21 RX ADMIN — PROPOFOL 180 MG: 10 INJECTION, EMULSION INTRAVENOUS at 07:25

## 2022-04-21 RX ADMIN — NEOSTIGMINE METHYLSULFATE 3 MG: 1 INJECTION, SOLUTION INTRAVENOUS at 08:35

## 2022-04-21 RX ADMIN — HYDROMORPHONE HYDROCHLORIDE 0.25 MG: 1 INJECTION, SOLUTION INTRAMUSCULAR; INTRAVENOUS; SUBCUTANEOUS at 10:01

## 2022-04-21 RX ADMIN — LIDOCAINE HYDROCHLORIDE 40 MG: 10 INJECTION, SOLUTION EPIDURAL; INFILTRATION; INTRACAUDAL; PERINEURAL at 07:25

## 2022-04-21 RX ADMIN — HYDROMORPHONE HYDROCHLORIDE 0.25 MG: 1 INJECTION, SOLUTION INTRAMUSCULAR; INTRAVENOUS; SUBCUTANEOUS at 09:34

## 2022-04-21 RX ADMIN — HYDROMORPHONE HYDROCHLORIDE 0.25 MG: 1 INJECTION, SOLUTION INTRAMUSCULAR; INTRAVENOUS; SUBCUTANEOUS at 09:51

## 2022-04-21 RX ADMIN — KETOROLAC TROMETHAMINE 30 MG: 30 INJECTION, SOLUTION INTRAMUSCULAR at 07:57

## 2022-04-21 RX ADMIN — FENTANYL CITRATE 50 MCG: 50 INJECTION, SOLUTION INTRAMUSCULAR; INTRAVENOUS at 09:23

## 2022-04-21 RX ADMIN — FENTANYL CITRATE 100 MCG: 50 INJECTION, SOLUTION INTRAMUSCULAR; INTRAVENOUS at 07:44

## 2022-04-21 RX ADMIN — HYDROMORPHONE HYDROCHLORIDE 0.25 MG: 1 INJECTION, SOLUTION INTRAMUSCULAR; INTRAVENOUS; SUBCUTANEOUS at 10:09

## 2022-04-21 RX ADMIN — HYDROMORPHONE HYDROCHLORIDE 0.25 MG: 1 INJECTION, SOLUTION INTRAMUSCULAR; INTRAVENOUS; SUBCUTANEOUS at 09:39

## 2022-04-21 RX ADMIN — FENTANYL CITRATE 100 MCG: 50 INJECTION, SOLUTION INTRAMUSCULAR; INTRAVENOUS at 07:22

## 2022-04-21 NOTE — PROGRESS NOTES
Patient was able to ambulate from the bathroom independently and change clothes with assist of 1  Patient wants to go home  Pain is currently a 7-8/10  Tylenol administered

## 2022-04-21 NOTE — OP NOTE
OPERATIVE REPORT  PATIENT NAME: Meggan Townsend    :  1985  MRN: [id number]  Pt Location: [facility] OR ROOM 01    SURGERY DATE: 2022    Surgeon(s) and Role:     Nel Ramirez MD - Primary     * Jose Francisco Colon PA-C - Assisting  The PA was necessary to provide expert assistance; i e  in the form of providing optimal exposure with retraction, suturing, and assistance with dissection in order to perform the most efficient operation and in order to optimize patient safety in the abscence of a qualified surgical resident  Preop Diagnosis:  Incisional hernia, without obstruction or gangrene [K43 2]    Post-Op Diagnosis Codes:     * Incisional hernia, without obstruction or gangrene [K43 2]    Procedure(s) (LRB):  REPAIR HERNIA INCISIONAL LAPAROSCOPIC WITH MESH (N/A)    Specimen(s):  * No specimens in log *    Estimated Blood Loss:   Minimal    Drains:  * No LDAs found *    Anesthesia Type:   General    Operative Indications:  Incisional hernia, without obstruction or gangrene [K43 2]  The patient is a 61-year-old female presenting with a symptomatic epigastric incisional hernia for which definitive treatment by laparoscopic mesh repair is now indicated  Operative Findings:  Patient is found have an epigastric incisional hernia consistent with preoperative imaging  The incarcerated greater omentum easily reduced after which the 15 x 20 centimeter Ventralight ST mesh was selected for the repair  It was secured with 2 rows of absorbable tacks and the procedure completed without event  Complications:   None    Procedure and Technique:  The patient was taken to the operating room where they are properly identified, monitored and anesthetized with general endotracheal anesthesia  They received antibiotics perioperatively  Venodynes used for DVT prophylaxis  Time-out performed  Skin prepped and draped  Skin incised in the left upper quadrant   A blunt-tipped 12 mm trocar was advanced into the peritoneal cavity and pneumoperitoneum established to 15 mm of mercury  A 10 mm 30 degree scope was advanced  The 4 quadrants of the peritoneal cavity was inspected laparoscopically  The ventral hernia was identified  A 2nd 5 mm trocar was placed in the left lower quadrant  All adhesions of omentum were taken down laparoscopically  The falciform ligament was taken down  The Ventralight STmesh was rolled up and delivered through the left upper quadrant trocar site  A stab wound made in the anterior abdominal wall  The grasping device used to grasp the blue catheter on the mesh  The green lattice was inflated  The mesh brought up to the anterior abdominal wall and oriented  It was secured in 2 rows with absorabable tacks  The green lattice was removed through the left upper quadrant trocar site  Two additional working ports were placed on the right side of the abdomen to help secure the mesh circumferentially  Satisfied with the lie of the mesh, the procedure completed with closure of the left upper quadrant trocar site with a suture grasping device and an 0 Vicryl suture  The pneumoperitoneum was released  All skin incisions closed with subcuticular 4 Monocryl suture  Wounds infiltrated with 0 5% Marcaine  Wounds dressed  The patient extubated and taken to recovery in stable condition       I was present for the entire procedure    Patient Disposition:  PACU       SIGNATURE: Ricarda Rosa MD  DATE: April 21, 2022  TIME: 8:40 AM

## 2022-04-21 NOTE — DISCHARGE INSTR - AVS FIRST PAGE
[facility]    Discharge Instructions  Light activity for 2 weeks  No heavy lifting for 2 weeks  Max 10 lbs for 2 weeks  No driving for 3-7 days or until pain is well controlled  Remove dressing in 3-5 days  Surgical glue will fall off with time  You may shower over dressing, replace if soiled  Take discharge medications as prescribed  Notify our office for nausea, vomiting, fever, diarrhea, chest pain, trouble breathing  Follow up in our office in 2 weeks or sooner if needed  Call with additional questions or concerns [phone]

## 2022-04-21 NOTE — INTERVAL H&P NOTE
H&P reviewed  After examining the patient I find no changes in the patients condition since the H&P had been written      Vitals:    04/21/22 0615   BP: 146/87   Pulse: 81   Resp: 16   Temp: 98 1 °F (36 7 °C)   SpO2: 99%

## 2022-04-21 NOTE — ANESTHESIA PREPROCEDURE EVALUATION
Medical History    History Comments   Hormone imbalance Pt states she has had since a teenager  Wears contact lenses      Procedure:  REPAIR HERNIA INCISIONAL LAPAROSCOPIC WITH MESH (N/A Abdomen)    Relevant Problems   ANESTHESIA (within normal limits)        Physical Exam    Airway    Mallampati score: II  TM Distance: >3 FB  Neck ROM: full     Dental   No notable dental hx     Cardiovascular  Rate: normal,     Pulmonary  Pulmonary exam normal     Other Findings        Anesthesia Plan  ASA Score- 2     Anesthesia Type- general with ASA Monitors  Additional Monitors:   Airway Plan: ETT  Plan Factors-Exercise tolerance (METS): >4 METS  Chart reviewed  Patient summary reviewed  Patient is a current smoker  Induction- intravenous  Postoperative Plan- Plan for postoperative opioid use  Informed Consent- Anesthetic plan and risks discussed with patient  I personally reviewed this patient with the CRNA  Discussed and agreed on the Anesthesia Plan with the CRNA  Juan Hunt

## 2022-04-21 NOTE — DISCHARGE INSTRUCTIONS
Ventral Hernia Repair   WHAT YOU NEED TO KNOW:   A ventral hernia repair is surgery to fix a ventral hernia  A ventral hernia may be repaired if the hernia is preventing blood flow to organs or blocking the intestines  It may be done laparoscopically or open  Laparoscopically means that your healthcare provider will use several small incisions to fix the hernia  In an open repair, your healthcare provider will make one incision to fix your hernia  DISCHARGE INSTRUCTIONS:   Call 911 for any of the following:   · You feel lightheaded, short of breath, and have chest pain  · You cough up blood  · You have trouble breathing  Seek care immediately if:   · Your arm or leg feels warm, tender, and painful  It may look swollen and red  · Blood soaks through your bandage  · Your abdomen feels hard and looks bigger than usual      · Your bowel movements are black, bloody, or tarry-looking  Contact your healthcare provider if:   · You have a fever above 101° F      · You develop a skin rash, hives, or itching  · Your incision is swollen, red, or draining pus or fluid  · You have nausea, or you are vomiting  · You cannot have a bowel movement  · Your pain does not get better after taking pain medicine  · You have questions or concerns about your condition or care  Medicines: You may need any of the following:  · Prescription pain medicine  may be given  Ask your healthcare provider how to take this medicine safely  · NSAIDs , such as ibuprofen, help decrease swelling, pain, and fever  This medicine is available with or without a doctor's order  NSAIDs can cause stomach bleeding or kidney problems in certain people  If you take blood thinner medicine, always ask your healthcare provider if NSAIDs are safe for you  Always read the medicine label and follow directions  · Take your medicine as directed    Contact your healthcare provider if you think your medicine is not helping or if you have side effects  Tell him or her if you are allergic to any medicine  Keep a list of the medicines, vitamins, and herbs you take  Include the amounts, and when and why you take them  Bring the list or the pill bottles to follow-up visits  Carry your medicine list with you in case of an emergency  Care for your wound as directed:  Carefully wash around your wound  It is okay to let soap and water run over your wound  Do not  scrub your wound  Dry the area and put on new, clean bandages as directed  Change your bandages when they get wet or dirty  If you have strips of medical tape over your incision, allow them to fall off on their own  Do not get in a bathtub, swimming pool, or hot tub until your healthcare provider says it is okay  Self-care:   · Eat a variety of healthy foods  Healthy foods include fruits, vegetables, whole-grain breads, low-fat dairy products, beans, lean meats, and fish  Healthy foods may help you heal faster  Ask if you need to be on a special diet  · Drink liquids as directed  Liquids may prevent constipation and straining during a bowel movement  This will help prevent pressure on your incision, and another hernia from happening  Ask how much liquid to drink each day and which liquids are best for you  · Apply ice  on your incision for 15 to 20 minutes every hour or as directed  Use an ice pack, or put crushed ice in a plastic bag  Cover it with a towel  Ice helps prevent tissue damage and decreases swelling and pain  · Take deep breaths and cough  10 times each hour  This will decrease your risk for a lung infection  Take a deep breath and hold it for as long as you can  Let the air out and then cough strongly  Deep breaths help open your airway  You may be given an incentive spirometer to help you take deep breaths  Put the plastic piece in your mouth and take a slow, deep breath, then let the air out and cough  Repeat these steps 10 times every hour  Press a pillow lightly against your incision when you cough  This may decrease pain or discomfort  · Wear a binder as directed  Your healthcare provider may tell you to wear a binder over your incision  A binder is an elastic bandage that wraps around your abdomen and over your incision  It fits snug and helps decrease pain when you move or cough  Driving:  Do not drive for at least one week after surgery  Do not drive if you are taking prescription pain medication  Ask your healthcare provider when it is safe for you to drive  Activity:  Do not lift anything heavy until your healthcare provider says it is okay  This may put too much pressure on your incision and cause it to come apart  It may also increase your risk for another hernia  Do not play sports for 2 to 3 weeks  Ask your healthcare provider when you can return to work and your normal activities  Follow up with your healthcare provider as directed:  Write down your questions so you remember to ask them during your visits  © [facility] 2022 Information is for End User's use only and may not be sold, redistributed or otherwise used for commercial purposes  All illustrations and images included in CareNotes® are the copyrighted property of A D A M , Inc  or [address] [place] [address]  The above information is an  only  It is not intended as medical advice for individual conditions or treatments  Talk to your doctor, nurse or pharmacist before following any medical regimen to see if it is safe and effective for you  Abdominal Binder   WHAT YOU NEED TO KNOW:   An abdominal binder is fitted elastic material that goes around your abdomen  It may be used to support your muscles  It also keeps bandages in place, or helps incisions heal after abdominal or pelvic surgery  DISCHARGE INSTRUCTIONS:   Contact your healthcare provider if:   · Your abdominal binder will not stay in place or frequently comes apart       · The skin under your abdominal binder is red, raw, or blistered  · You have questions about how to wear your abdominal binder correctly  Important things to know about an abdominal binder:   · Wear your abdominal binder as directed  Do not take it off until your healthcare provider says it is okay to do so  · Use the right size abdominal binder  The abdominal binder may irritate your skin or not work correctly if it is too big or too small  Your healthcare provider will show you the correct size to wear  · Care for your skin and incision under your abdominal binder  Remove your abdominal binder as directed to clean your skin and incision  Wash your hands before you touch the incision  Clean your skin and change bandages as directed  Check your skin for redness, warmth, swelling, or numbness  · Care for your abdominal binder  Follow the directions on the label to clean your abdominal binder  You may be able to hand wash it in cold water and hang it to dry  How to use an abdominal binder:   · Place your abdominal binder around your abdomen  Place it over bandages and under your clothes unless you are told differently by your healthcare provider  · Fasten the 2 sides of your abdominal binder  as directed  Start from the bottom and work up if you had a tummy tuck or other abdominal surgery  If you had a , your healthcare provider may tell you to begin at the top and fasten down  · Make sure the abdominal binder fits comfortably  It should be snug but not too tight  Adjust it as needed  Make sure it is even and that there are no wrinkles  Make sure that you can breathe normally and go the bathroom  Make sure any tubes or drains are not pinched and fluid can empty  © Copyright [address]  Information is for End User's use only and may not be sold, redistributed or otherwise used for commercial purposes   All illustrations and images included in CareNotes® are the copyrighted property of A [employer]  or [address]   The above information is an  only  It is not intended as medical advice for individual conditions or treatments  Talk to your doctor, nurse or pharmacist before following any medical regimen to see if it is safe and effective for you  How to Use an Incentive Spirometer   WHAT YOU NEED TO KNOW:   An incentive spirometer is a device that measures how deeply you can inhale (breathe in)  It helps you take slow, deep breaths to expand and fill your lungs with air  This helps prevent lung problems, such as pneumonia  The incentive spirometer is made up of a breathing tube, an air chamber, and an indicator  The breathing tube is connected to the air chamber and has a mouthpiece at the end  The indicator is found inside the device  DISCHARGE INSTRUCTIONS:   Reasons to use an incentive spirometer: An incentive spirometer is most commonly used after surgery  People who are at increased risk of airway or breathing problems may also use one  These include people who smoke or have lung disease  This may also include people who are not active or cannot move well  How to use an incentive spirometer:  Sit up as straight as possible  Do not bend your head forward or backward  Hold the incentive spirometer in an upright position  Place the target pointer to the level that you need to reach or that your healthcare provider has suggested  Exhale (breathe out) normally and then do the following:  · Put the mouthpiece in your mouth and close your lips tightly around it  Do not block the mouthpiece with your tongue  · Inhale slowly and deeply through the mouthpiece to raise the indicator  Try to make the indicator rise up to the level of the goal marker  · When you cannot inhale any longer, remove the mouthpiece and hold your breath for at least 3 seconds  · Exhale normally      · Repeat these steps 10 to 12 times every hour when you are awake, or as often as directed  · Clean the mouthpiece with soap and water after each use  Do not use a disposable mouthpiece for longer than 24 hours  · Keep a log of the highest level you are able to reach each time  This will help healthcare providers see if your lung function improves  Follow up with your doctor as directed:  Write down your questions so you remember to ask them during your visits  Contact your healthcare provider if:   · You feel dizzy or lightheaded  · You have a wound that is painful every time you breathe deeply  · You have questions or concerns about how to use your IS  Return to the emergency department if:   · You have chest pain or shortness of breath  · You feel faint  © Copyright [employer] 2022 Information is for End User's use only and may not be sold, redistributed or otherwise used for commercial purposes  All illustrations and images included in CareNotes® are the copyrighted property of A D A M , Inc  or [facility] [address]   The above information is an  only  It is not intended as medical advice for individual conditions or treatments  Talk to your doctor, nurse or pharmacist before following any medical regimen to see if it is safe and effective for you

## 2022-04-21 NOTE — ANESTHESIA POSTPROCEDURE EVALUATION
Post-Op Assessment Note    CV Status:  Stable  Pain Score: 0    Pain management: adequate     Mental Status:  Alert   Hydration Status:  Stable   PONV Controlled:  Controlled   Airway Patency:  Patent      Post Op Vitals Reviewed: Yes      Staff: CRNA         No complications documented      BP   154/81   Temp   98 3   Pulse 78   Resp 20   SpO2 99

## 2022-04-22 ENCOUNTER — TELEPHONE (OUTPATIENT)
Dept: OTHER | Facility: HOSPITAL | Age: 37
End: 2022-04-22

## 2022-04-22 NOTE — TELEPHONE ENCOUNTER
Called and attempted to leave a voicemail with contact information to review the findings at the time of surgery and to discuss any questions or concerns that they might have in the early postoperative period

## 2022-04-25 ENCOUNTER — TELEPHONE (OUTPATIENT)
Dept: SURGERY | Facility: CLINIC | Age: 37
End: 2022-04-25

## 2022-05-02 RX ORDER — DOCUSATE SODIUM 100 MG/1
CAPSULE, LIQUID FILLED ORAL
COMMUNITY
Start: 2022-04-21

## 2022-05-04 ENCOUNTER — OFFICE VISIT (OUTPATIENT)
Dept: SURGERY | Facility: CLINIC | Age: 37
End: 2022-05-04

## 2022-05-04 VITALS — TEMPERATURE: 98.6 F

## 2022-05-04 DIAGNOSIS — K43.2 INCISIONAL HERNIA, WITHOUT OBSTRUCTION OR GANGRENE: Primary | ICD-10-CM

## 2022-05-04 PROCEDURE — 99024 POSTOP FOLLOW-UP VISIT: CPT | Performed by: SURGERY

## 2022-05-04 NOTE — ASSESSMENT & PLAN NOTE
Patient is a pleasant 68-year-old female returning to the office status post laparoscopic ventral incisional herniorrhaphy with mesh on the 21st of April  Patient does note significant pain most notably on the left of the midline  On physical examination she is well nourished well-appearing  She is in no acute distress  Competent reliable as a historian  The surgical wounds clean dry intact with no signs of early recurrent hernia formation  Patient appears clinically stable status post laparoscopic ventral incisional herniorrhaphy with mesh  The patient will return to the office in 2 weeks time at which point she we will make a decision regarding the timing of her return to work

## 2022-05-04 NOTE — PROGRESS NOTES
Assessment/Plan:    Incisional hernia, without obstruction or gangrene  Patient is a pleasant 41-year-old female returning to the office status post laparoscopic ventral incisional herniorrhaphy with mesh on the 21st of April  Patient does note significant pain most notably on the left of the midline  On physical examination she is well nourished well-appearing  She is in no acute distress  Competent reliable as a historian  The surgical wounds clean dry intact with no signs of early recurrent hernia formation  Patient appears clinically stable status post laparoscopic ventral incisional herniorrhaphy with mesh  The patient will return to the office in 2 weeks time at which point she we will make a decision regarding the timing of her return to work  Subjective:      Patient ID: Long Kothari is a 42 y o  female  Patient came into the office s/p ventral hernia repair 4/21/2022  Patient stated that she is having pain to the right of her umbilical area ever since she stretched the other day  Denied fever or chills  States that she is eating and having regular bowel movement  Carla ANAYA MA          Review of Systems   Constitutional: Negative for chills and fever  HENT: Negative for ear pain and sore throat  Eyes: Negative for pain and visual disturbance  Respiratory: Negative for cough and shortness of breath  Cardiovascular: Negative for chest pain and palpitations  Gastrointestinal: Negative for abdominal pain and vomiting  Genitourinary: Negative for dysuria and hematuria  Musculoskeletal: Negative for arthralgias and back pain  Skin: Negative for color change and rash  Neurological: Negative for seizures and syncope  All other systems reviewed and are negative  Objective:      Temp 98 6 °F (37 °C) (Temporal)          Physical Exam  Constitutional:       Appearance: She is well-developed  HENT:      Head: Normocephalic and atraumatic     Eyes: Conjunctiva/sclera: Conjunctivae normal       Pupils: Pupils are equal, round, and reactive to light  Cardiovascular:      Rate and Rhythm: Normal rate and regular rhythm  Pulmonary:      Effort: Pulmonary effort is normal       Breath sounds: Normal breath sounds  Abdominal:      General: Bowel sounds are normal       Palpations: Abdomen is soft  Comments: Abdominal exam as described above   Musculoskeletal:         General: Normal range of motion  Cervical back: Normal range of motion and neck supple  Skin:     General: Skin is warm and dry  Neurological:      Mental Status: She is alert and oriented to person, place, and time  Psychiatric:         Behavior: Behavior normal          Thought Content:  Thought content normal          Judgment: Judgment normal

## 2022-05-04 NOTE — LETTER
May 4, 2022     [address] [address]    Patient: Lavelle Purdy   YOB: 1985   Date of Visit: 5/4/2022       Dear Dr Carlee Presley:    Thank you for referring Lavelle Purdy to me for evaluation  Below are my notes for this consultation  If you have questions, please do not hesitate to call me  I look forward to following your patient along with you  Sincerely,        Simon Miramontes MD        CC: No Recipients  Simon Miramontes MD  5/4/2022  9:30 AM  Incomplete  Assessment/Plan:    Incisional hernia, without obstruction or gangrene  Patient is a pleasant 77-year-old female returning to the office status post laparoscopic ventral incisional herniorrhaphy with mesh on the 21st of April  Patient does note significant pain most notably on the left of the midline  On physical examination she is well nourished well-appearing  She is in no acute distress  Competent reliable as a historian  The surgical wounds clean dry intact with no signs of early recurrent hernia formation  Patient appears clinically stable status post laparoscopic ventral incisional herniorrhaphy with mesh  The patient will return to the office in 2 weeks time at which point she we will make a decision regarding the timing of her return to work  Subjective:      Patient ID: Lavelle Purdy is a 42 y o  female  Patient came into the office s/p ventral hernia repair 4/21/2022  Patient stated that she is having pain to the right of her umbilical area ever since she stretched the other day  Denied fever or chills  States that she is eating and having regular bowel movement  Carla ANAYA MA          Review of Systems   Constitutional: Negative for chills and fever  HENT: Negative for ear pain and sore throat  Eyes: Negative for pain and visual disturbance  Respiratory: Negative for cough and shortness of breath      Cardiovascular: Negative for chest pain and palpitations  Gastrointestinal: Negative for abdominal pain and vomiting  Genitourinary: Negative for dysuria and hematuria  Musculoskeletal: Negative for arthralgias and back pain  Skin: Negative for color change and rash  Neurological: Negative for seizures and syncope  All other systems reviewed and are negative  Objective:      Temp 98 6 °F (37 °C) (Temporal)          Physical Exam  Constitutional:       Appearance: She is well-developed  HENT:      Head: Normocephalic and atraumatic  Eyes:      Conjunctiva/sclera: Conjunctivae normal       Pupils: Pupils are equal, round, and reactive to light  Cardiovascular:      Rate and Rhythm: Normal rate and regular rhythm  Pulmonary:      Effort: Pulmonary effort is normal       Breath sounds: Normal breath sounds  Abdominal:      General: Bowel sounds are normal       Palpations: Abdomen is soft  Comments: Abdominal exam as described above   Musculoskeletal:         General: Normal range of motion  Cervical back: Normal range of motion and neck supple  Skin:     General: Skin is warm and dry  Neurological:      Mental Status: She is alert and oriented to person, place, and time  Psychiatric:         Behavior: Behavior normal          Thought Content:  Thought content normal          Judgment: Judgment normal

## 2022-05-18 ENCOUNTER — OFFICE VISIT (OUTPATIENT)
Dept: SURGERY | Facility: CLINIC | Age: 37
End: 2022-05-18

## 2022-05-18 VITALS
BODY MASS INDEX: 43.63 KG/M2 | OXYGEN SATURATION: 95 % | DIASTOLIC BLOOD PRESSURE: 70 MMHG | HEART RATE: 97 BPM | TEMPERATURE: 98.1 F | HEIGHT: 59 IN | SYSTOLIC BLOOD PRESSURE: 126 MMHG | RESPIRATION RATE: 16 BRPM

## 2022-05-18 DIAGNOSIS — K43.2 INCISIONAL HERNIA, WITHOUT OBSTRUCTION OR GANGRENE: Primary | ICD-10-CM

## 2022-05-18 PROCEDURE — 99024 POSTOP FOLLOW-UP VISIT: CPT | Performed by: SURGERY

## 2022-05-18 NOTE — LETTER
May 18, 2022     [address]  [facility] [address]    Patient: Arnie Valencia   YOB: 1985   Date of Visit: 5/18/2022       Dear Dr mAbar Patel:    Thank you for referring Arnie Valencia to me for evaluation  Below are my notes for this consultation  If you have questions, please do not hesitate to call me  I look forward to following your patient along with you  Sincerely,        Tommy Soto MD        CC: No Recipients  Tommy Soto MD  5/18/2022  8:22 AM  Incomplete  Assessment/Plan:    Incisional hernia, without obstruction or gangrene  Patient returns to the office for scheduled follow-up  She voiced no new concerns today with regards to her abdominal wall and the healing from her laparoscopic incisional herniorrhaphy with mesh  She is still reluctant to return to work because of the heavy lifting required  And I believe this is still a reasonable concern  For this reason I have given her an additional 2 weeks prior to releasing her to return to work without restriction  Her physical examination today is unchanged  I believe the patient is well following her laparoscopic incisional herniorrhaphy with mesh  All questions were answered to the satisfaction of the patient  She has been encouraged to resume normal levels of activity as she feels fit  The patient has been encouraged to follow up with the practice at any point future with questions or concerns  Subjective:      Patient ID: Arnie Valencia is a 42 y o  female  Patient came into to the office to discuss return to work, s/p ventral hernia repair 4/21/2022  Patient stated that the pain a the midline has diminished and would like to have a few more weeks off do to lifting restrictions and working 12 hours days  No fever or chills   Carla ANAYA MA          Review of Systems      Objective:      /70 (BP Location: Left arm, Patient Position: Sitting, Cuff Size: Standard)   Pulse 97   Temp 98 1 °F (36 7 °C) (Temporal)   Resp 16   Ht 4' 11" (1 499 m)   SpO2 95%   BMI 43 63 kg/m²          Physical Exam

## 2022-05-18 NOTE — ASSESSMENT & PLAN NOTE
Patient returns to the office for scheduled follow-up  She voiced no new concerns today with regards to her abdominal wall and the healing from her laparoscopic incisional herniorrhaphy with mesh  She is still reluctant to return to work because of the heavy lifting required  And I believe this is still a reasonable concern  For this reason I have given her an additional 2 weeks prior to releasing her to return to work without restriction  Her physical examination today is unchanged  I believe the patient is well following her laparoscopic incisional herniorrhaphy with mesh  All questions were answered to the satisfaction of the patient  She has been encouraged to resume normal levels of activity as she feels fit  The patient has been encouraged to follow up with the practice at any point future with questions or concerns

## 2022-05-18 NOTE — PROGRESS NOTES
Assessment/Plan:    Incisional hernia, without obstruction or gangrene  Patient returns to the office for scheduled follow-up  She voiced no new concerns today with regards to her abdominal wall and the healing from her laparoscopic incisional herniorrhaphy with mesh  She is still reluctant to return to work because of the heavy lifting required  And I believe this is still a reasonable concern  For this reason I have given her an additional 2 weeks prior to releasing her to return to work without restriction  Her physical examination today is unchanged  I believe the patient is well following her laparoscopic incisional herniorrhaphy with mesh  All questions were answered to the satisfaction of the patient  She has been encouraged to resume normal levels of activity as she feels fit  The patient has been encouraged to follow up with the practice at any point future with questions or concerns  Subjective:      Patient ID: Ade Wang is a 42 y o  female  Patient came into to the office to discuss return to work, s/p ventral hernia repair 4/21/2022  Patient stated that the pain a the midline has diminished and would like to have a few more weeks off do to lifting restrictions and working 12 hours days  No fever or chills   Carla ANAYA MA          Review of Systems      Objective:      /70 (BP Location: Left arm, Patient Position: Sitting, Cuff Size: Standard)   Pulse 97   Temp 98 1 °F (36 7 °C) (Temporal)   Resp 16   Ht 4' 11" (1 499 m)   SpO2 95%   BMI 43 63 kg/m²          Physical Exam

## 2023-05-02 ENCOUNTER — HOSPITAL ENCOUNTER (EMERGENCY)
Facility: HOSPITAL | Age: 38
End: 2023-05-03
Attending: EMERGENCY MEDICINE

## 2023-05-02 DIAGNOSIS — F11.20 OPIOID USE DISORDER, SEVERE, DEPENDENCE (HCC): ICD-10-CM

## 2023-05-02 DIAGNOSIS — F11.93 ACUTE OPIOID WITHDRAWAL (HCC): Primary | ICD-10-CM

## 2023-05-02 DIAGNOSIS — R45.1 AGITATION REQUIRING SEDATION PROTOCOL: ICD-10-CM

## 2023-05-02 LAB
ANION GAP SERPL CALCULATED.3IONS-SCNC: 13 MMOL/L (ref 4–13)
BASOPHILS # BLD AUTO: 0.04 THOUSANDS/ÂΜL (ref 0–0.1)
BASOPHILS NFR BLD AUTO: 1 % (ref 0–1)
BUN SERPL-MCNC: 12 MG/DL (ref 5–25)
CALCIUM SERPL-MCNC: 9.6 MG/DL (ref 8.4–10.2)
CHLORIDE SERPL-SCNC: 102 MMOL/L (ref 96–108)
CK SERPL-CCNC: 223 U/L (ref 26–192)
CO2 SERPL-SCNC: 23 MMOL/L (ref 21–32)
CREAT SERPL-MCNC: 1.13 MG/DL (ref 0.6–1.3)
EOSINOPHIL # BLD AUTO: 0.15 THOUSAND/ÂΜL (ref 0–0.61)
EOSINOPHIL NFR BLD AUTO: 2 % (ref 0–6)
ERYTHROCYTE [DISTWIDTH] IN BLOOD BY AUTOMATED COUNT: 11.9 % (ref 11.6–15.1)
ETHANOL SERPL-MCNC: <10 MG/DL
GFR SERPL CREATININE-BSD FRML MDRD: 60 ML/MIN/1.73SQ M
GLUCOSE SERPL-MCNC: 102 MG/DL (ref 65–140)
HCT VFR BLD AUTO: 42.2 % (ref 34.8–46.1)
HGB BLD-MCNC: 14.4 G/DL (ref 11.5–15.4)
HIV 1+2 AB+HIV1 P24 AG SERPL QL IA: NORMAL
HIV1 P24 AG SER QL: NORMAL
IMM GRANULOCYTES # BLD AUTO: 0.02 THOUSAND/UL (ref 0–0.2)
IMM GRANULOCYTES NFR BLD AUTO: 0 % (ref 0–2)
LYMPHOCYTES # BLD AUTO: 0.96 THOUSANDS/ÂΜL (ref 0.6–4.47)
LYMPHOCYTES NFR BLD AUTO: 15 % (ref 14–44)
MCH RBC QN AUTO: 29.3 PG (ref 26.8–34.3)
MCHC RBC AUTO-ENTMCNC: 34.1 G/DL (ref 31.4–37.4)
MCV RBC AUTO: 86 FL (ref 82–98)
MONOCYTES # BLD AUTO: 0.31 THOUSAND/ÂΜL (ref 0.17–1.22)
MONOCYTES NFR BLD AUTO: 5 % (ref 4–12)
NEUTROPHILS # BLD AUTO: 4.9 THOUSANDS/ÂΜL (ref 1.85–7.62)
NEUTS SEG NFR BLD AUTO: 77 % (ref 43–75)
NRBC BLD AUTO-RTO: 0 /100 WBCS
PLATELET # BLD AUTO: 263 THOUSANDS/UL (ref 149–390)
PMV BLD AUTO: 9.1 FL (ref 8.9–12.7)
POTASSIUM SERPL-SCNC: 3.9 MMOL/L (ref 3.5–5.3)
RBC # BLD AUTO: 4.92 MILLION/UL (ref 3.81–5.12)
SODIUM SERPL-SCNC: 138 MMOL/L (ref 135–147)
WBC # BLD AUTO: 6.38 THOUSAND/UL (ref 4.31–10.16)

## 2023-05-02 RX ORDER — MORPHINE SULFATE 10 MG/ML
8 INJECTION, SOLUTION INTRAMUSCULAR; INTRAVENOUS ONCE
Status: COMPLETED | OUTPATIENT
Start: 2023-05-02 | End: 2023-05-02

## 2023-05-02 RX ORDER — MORPHINE SULFATE 4 MG/ML
4 INJECTION, SOLUTION INTRAMUSCULAR; INTRAVENOUS ONCE
Status: DISCONTINUED | OUTPATIENT
Start: 2023-05-02 | End: 2023-05-02

## 2023-05-02 RX ORDER — CLONIDINE HYDROCHLORIDE 0.1 MG/1
0.2 TABLET ORAL ONCE
Status: COMPLETED | OUTPATIENT
Start: 2023-05-02 | End: 2023-05-02

## 2023-05-02 RX ORDER — MIDAZOLAM HYDROCHLORIDE 2 MG/2ML
2 INJECTION, SOLUTION INTRAMUSCULAR; INTRAVENOUS ONCE
Status: COMPLETED | OUTPATIENT
Start: 2023-05-02 | End: 2023-05-02

## 2023-05-02 RX ORDER — MIDAZOLAM HYDROCHLORIDE 2 MG/2ML
1 INJECTION, SOLUTION INTRAMUSCULAR; INTRAVENOUS ONCE
Status: COMPLETED | OUTPATIENT
Start: 2023-05-02 | End: 2023-05-02

## 2023-05-02 RX ORDER — DEXMEDETOMIDINE HYDROCHLORIDE 4 UG/ML
.1-.7 INJECTION, SOLUTION INTRAVENOUS
Status: DISCONTINUED | OUTPATIENT
Start: 2023-05-02 | End: 2023-05-03 | Stop reason: HOSPADM

## 2023-05-02 RX ORDER — MORPHINE SULFATE 4 MG/ML
4 INJECTION, SOLUTION INTRAMUSCULAR; INTRAVENOUS ONCE
Status: COMPLETED | OUTPATIENT
Start: 2023-05-02 | End: 2023-05-02

## 2023-05-02 RX ORDER — DROPERIDOL 2.5 MG/ML
2.5 INJECTION, SOLUTION INTRAMUSCULAR; INTRAVENOUS ONCE
Status: COMPLETED | OUTPATIENT
Start: 2023-05-02 | End: 2023-05-02

## 2023-05-02 RX ORDER — MIDAZOLAM HYDROCHLORIDE 2 MG/2ML
5 INJECTION, SOLUTION INTRAMUSCULAR; INTRAVENOUS ONCE
Status: DISCONTINUED | OUTPATIENT
Start: 2023-05-02 | End: 2023-05-02

## 2023-05-02 RX ORDER — ONDANSETRON 2 MG/ML
4 INJECTION INTRAMUSCULAR; INTRAVENOUS ONCE
Status: DISCONTINUED | OUTPATIENT
Start: 2023-05-02 | End: 2023-05-03 | Stop reason: HOSPADM

## 2023-05-02 RX ORDER — DIPHENHYDRAMINE HYDROCHLORIDE 50 MG/ML
50 INJECTION INTRAMUSCULAR; INTRAVENOUS ONCE
Status: DISCONTINUED | OUTPATIENT
Start: 2023-05-02 | End: 2023-05-02

## 2023-05-02 RX ORDER — DIPHENHYDRAMINE HYDROCHLORIDE 50 MG/ML
50 INJECTION INTRAMUSCULAR; INTRAVENOUS ONCE
Status: COMPLETED | OUTPATIENT
Start: 2023-05-02 | End: 2023-05-02

## 2023-05-02 RX ADMIN — CLONIDINE HYDROCHLORIDE 0.2 MG: 0.1 TABLET ORAL at 19:50

## 2023-05-02 RX ADMIN — MORPHINE SULFATE 8 MG: 10 INJECTION INTRAVENOUS at 21:14

## 2023-05-02 RX ADMIN — DROPERIDOL 2.5 MG: 2.5 INJECTION, SOLUTION INTRAMUSCULAR; INTRAVENOUS at 21:45

## 2023-05-02 RX ADMIN — MIDAZOLAM 2 MG: 1 INJECTION INTRAMUSCULAR; INTRAVENOUS at 19:40

## 2023-05-02 RX ADMIN — MIDAZOLAM 1 MG: 1 INJECTION INTRAMUSCULAR; INTRAVENOUS at 20:41

## 2023-05-02 RX ADMIN — DEXMEDETOMIDINE HYDROCHLORIDE 0.2 MCG/KG/HR: 400 INJECTION INTRAVENOUS at 22:11

## 2023-05-02 RX ADMIN — DIPHENHYDRAMINE HYDROCHLORIDE 50 MG: 50 INJECTION, SOLUTION INTRAMUSCULAR; INTRAVENOUS at 19:49

## 2023-05-02 RX ADMIN — MORPHINE SULFATE 4 MG: 4 INJECTION INTRAVENOUS at 20:17

## 2023-05-03 ENCOUNTER — HOSPITAL ENCOUNTER (INPATIENT)
Facility: HOSPITAL | Age: 38
LOS: 1 days | Discharge: HOME | End: 2023-05-03
Attending: EMERGENCY MEDICINE | Admitting: EMERGENCY MEDICINE

## 2023-05-03 VITALS
RESPIRATION RATE: 16 BRPM | TEMPERATURE: 98.5 F | HEART RATE: 61 BPM | SYSTOLIC BLOOD PRESSURE: 134 MMHG | HEIGHT: 66 IN | DIASTOLIC BLOOD PRESSURE: 81 MMHG | WEIGHT: 222.66 LBS | OXYGEN SATURATION: 100 % | BODY MASS INDEX: 35.79 KG/M2

## 2023-05-03 VITALS
OXYGEN SATURATION: 97 % | RESPIRATION RATE: 36 BRPM | DIASTOLIC BLOOD PRESSURE: 91 MMHG | HEART RATE: 91 BPM | WEIGHT: 222.44 LBS | SYSTOLIC BLOOD PRESSURE: 182 MMHG | BODY MASS INDEX: 44.93 KG/M2 | TEMPERATURE: 98.2 F

## 2023-05-03 DIAGNOSIS — F11.20 OPIOID USE DISORDER, SEVERE, DEPENDENCE (HCC): ICD-10-CM

## 2023-05-03 PROBLEM — D72.829 LEUKOCYTOSIS: Status: ACTIVE | Noted: 2023-05-03

## 2023-05-03 PROBLEM — F11.93 OPIOID WITHDRAWAL (HCC): Status: RESOLVED | Noted: 2023-05-03 | Resolved: 2023-05-03

## 2023-05-03 PROBLEM — F11.93 OPIOID WITHDRAWAL (HCC): Status: ACTIVE | Noted: 2023-05-03

## 2023-05-03 PROBLEM — Z72.0 TOBACCO USE: Status: ACTIVE | Noted: 2023-05-03

## 2023-05-03 PROBLEM — R74.8 ELEVATED CK: Status: ACTIVE | Noted: 2023-05-03

## 2023-05-03 PROBLEM — I10 HTN (HYPERTENSION): Status: ACTIVE | Noted: 2023-05-03

## 2023-05-03 PROBLEM — E11.9 TYPE 2 DIABETES MELLITUS (HCC): Status: ACTIVE | Noted: 2023-05-03

## 2023-05-03 LAB
ALBUMIN SERPL BCP-MCNC: 3.9 G/DL (ref 3.5–5)
ALP SERPL-CCNC: 86 U/L (ref 34–104)
ALT SERPL W P-5'-P-CCNC: 35 U/L (ref 7–52)
ANION GAP SERPL CALCULATED.3IONS-SCNC: 7 MMOL/L (ref 4–13)
AST SERPL W P-5'-P-CCNC: 33 U/L (ref 13–39)
ATRIAL RATE: 57 BPM
BILIRUB SERPL-MCNC: 0.88 MG/DL (ref 0.2–1)
BUN SERPL-MCNC: 14 MG/DL (ref 5–25)
CALCIUM SERPL-MCNC: 9.3 MG/DL (ref 8.4–10.2)
CHLORIDE SERPL-SCNC: 106 MMOL/L (ref 96–108)
CK SERPL-CCNC: 712 U/L (ref 26–192)
CO2 SERPL-SCNC: 24 MMOL/L (ref 21–32)
CREAT SERPL-MCNC: 1.14 MG/DL (ref 0.6–1.3)
ERYTHROCYTE [DISTWIDTH] IN BLOOD BY AUTOMATED COUNT: 11.7 % (ref 11.6–15.1)
GFR SERPL CREATININE-BSD FRML MDRD: 60 ML/MIN/1.73SQ M
GLUCOSE SERPL-MCNC: 162 MG/DL (ref 65–140)
HBV SURFACE AG SER QL: NORMAL
HCT VFR BLD AUTO: 38.4 % (ref 34.8–46.1)
HCV AB SER QL: NORMAL
HGB BLD-MCNC: 13.3 G/DL (ref 11.5–15.4)
MAGNESIUM SERPL-MCNC: 2.1 MG/DL (ref 1.9–2.7)
MCH RBC QN AUTO: 29 PG (ref 26.8–34.3)
MCHC RBC AUTO-ENTMCNC: 34.6 G/DL (ref 31.4–37.4)
MCV RBC AUTO: 84 FL (ref 82–98)
P AXIS: 51 DEGREES
PLATELET # BLD AUTO: 266 THOUSANDS/UL (ref 149–390)
PMV BLD AUTO: 9.5 FL (ref 8.9–12.7)
POTASSIUM SERPL-SCNC: 4.7 MMOL/L (ref 3.5–5.3)
PR INTERVAL: 130 MS
PROT SERPL-MCNC: 6.6 G/DL (ref 6.4–8.4)
QRS AXIS: -7 DEGREES
QRSD INTERVAL: 76 MS
QT INTERVAL: 470 MS
QTC INTERVAL: 457 MS
RBC # BLD AUTO: 4.58 MILLION/UL (ref 3.81–5.12)
SODIUM SERPL-SCNC: 137 MMOL/L (ref 135–147)
T WAVE AXIS: 4 DEGREES
VENTRICULAR RATE: 57 BPM
WBC # BLD AUTO: 10.5 THOUSAND/UL (ref 4.31–10.16)

## 2023-05-03 PROCEDURE — HZ2ZZZZ DETOXIFICATION SERVICES FOR SUBSTANCE ABUSE TREATMENT: ICD-10-PCS | Performed by: EMERGENCY MEDICINE

## 2023-05-03 RX ORDER — DEXMEDETOMIDINE HYDROCHLORIDE 4 UG/ML
INJECTION, SOLUTION INTRAVENOUS
Status: COMPLETED
Start: 2023-05-03 | End: 2023-05-03

## 2023-05-03 RX ORDER — NICOTINE 21 MG/24HR
1 PATCH, TRANSDERMAL 24 HOURS TRANSDERMAL DAILY
Status: CANCELLED | OUTPATIENT
Start: 2023-05-03

## 2023-05-03 RX ORDER — GABAPENTIN 300 MG/1
300 CAPSULE ORAL EVERY 8 HOURS PRN
Status: DISCONTINUED | OUTPATIENT
Start: 2023-05-03 | End: 2023-05-03 | Stop reason: HOSPADM

## 2023-05-03 RX ORDER — CLONIDINE HYDROCHLORIDE 0.1 MG/1
0.1 TABLET ORAL EVERY 6 HOURS PRN
Status: DISCONTINUED | OUTPATIENT
Start: 2023-05-03 | End: 2023-05-03 | Stop reason: HOSPADM

## 2023-05-03 RX ORDER — ACETAMINOPHEN 325 MG/1
650 TABLET ORAL EVERY 6 HOURS PRN
Status: CANCELLED | OUTPATIENT
Start: 2023-05-03

## 2023-05-03 RX ORDER — DROPERIDOL 2.5 MG/ML
2.5 INJECTION, SOLUTION INTRAMUSCULAR; INTRAVENOUS ONCE
Status: COMPLETED | OUTPATIENT
Start: 2023-05-03 | End: 2023-05-03

## 2023-05-03 RX ORDER — ONDANSETRON 2 MG/ML
4 INJECTION INTRAMUSCULAR; INTRAVENOUS EVERY 6 HOURS PRN
Status: CANCELLED | OUTPATIENT
Start: 2023-05-03

## 2023-05-03 RX ORDER — DEXMEDETOMIDINE HYDROCHLORIDE 4 UG/ML
.1-1 INJECTION, SOLUTION INTRAVENOUS
Status: CANCELLED | OUTPATIENT
Start: 2023-05-03

## 2023-05-03 RX ORDER — ONDANSETRON 2 MG/ML
4 INJECTION INTRAMUSCULAR; INTRAVENOUS EVERY 6 HOURS PRN
Status: DISCONTINUED | OUTPATIENT
Start: 2023-05-03 | End: 2023-05-03 | Stop reason: HOSPADM

## 2023-05-03 RX ORDER — ENOXAPARIN SODIUM 100 MG/ML
40 INJECTION SUBCUTANEOUS DAILY
Status: DISCONTINUED | OUTPATIENT
Start: 2023-05-03 | End: 2023-05-03 | Stop reason: HOSPADM

## 2023-05-03 RX ORDER — TRAZODONE HYDROCHLORIDE 50 MG/1
50 TABLET ORAL
Status: CANCELLED | OUTPATIENT
Start: 2023-05-03

## 2023-05-03 RX ORDER — DEXMEDETOMIDINE HYDROCHLORIDE 4 UG/ML
.1-1.2 INJECTION, SOLUTION INTRAVENOUS
Status: DISCONTINUED | OUTPATIENT
Start: 2023-05-03 | End: 2023-05-03

## 2023-05-03 RX ORDER — GABAPENTIN 300 MG/1
300 CAPSULE ORAL EVERY 8 HOURS PRN
Status: CANCELLED | OUTPATIENT
Start: 2023-05-03

## 2023-05-03 RX ORDER — ACETAMINOPHEN 325 MG/1
650 TABLET ORAL EVERY 6 HOURS PRN
Status: DISCONTINUED | OUTPATIENT
Start: 2023-05-03 | End: 2023-05-03 | Stop reason: HOSPADM

## 2023-05-03 RX ORDER — ENOXAPARIN SODIUM 100 MG/ML
40 INJECTION SUBCUTANEOUS DAILY
Status: CANCELLED | OUTPATIENT
Start: 2023-05-03

## 2023-05-03 RX ORDER — CLONIDINE HYDROCHLORIDE 0.1 MG/1
0.1 TABLET ORAL EVERY 6 HOURS PRN
Status: CANCELLED | OUTPATIENT
Start: 2023-05-03

## 2023-05-03 RX ORDER — TRAZODONE HYDROCHLORIDE 50 MG/1
50 TABLET ORAL
Status: DISCONTINUED | OUTPATIENT
Start: 2023-05-03 | End: 2023-05-03 | Stop reason: HOSPADM

## 2023-05-03 RX ORDER — SODIUM CHLORIDE 9 MG/ML
100 INJECTION, SOLUTION INTRAVENOUS CONTINUOUS
Status: DISCONTINUED | OUTPATIENT
Start: 2023-05-03 | End: 2023-05-03 | Stop reason: HOSPADM

## 2023-05-03 RX ORDER — NICOTINE 21 MG/24HR
1 PATCH, TRANSDERMAL 24 HOURS TRANSDERMAL DAILY
Status: DISCONTINUED | OUTPATIENT
Start: 2023-05-03 | End: 2023-05-03 | Stop reason: HOSPADM

## 2023-05-03 RX ADMIN — ENOXAPARIN SODIUM 40 MG: 40 INJECTION SUBCUTANEOUS at 08:05

## 2023-05-03 RX ADMIN — DROPERIDOL 2.5 MG: 2.5 INJECTION, SOLUTION INTRAMUSCULAR; INTRAVENOUS at 00:06

## 2023-05-03 RX ADMIN — DEXMEDETOMIDINE HYDROCHLORIDE 0.7 MCG/KG/HR: 400 INJECTION INTRAVENOUS at 00:12

## 2023-05-03 RX ADMIN — DEXMEDETOMIDINE HYDROCHLORIDE 1.2 MCG/KG/HR: 100 INJECTION, SOLUTION INTRAVENOUS at 10:27

## 2023-05-03 RX ADMIN — MULTIPLE VITAMINS W/ MINERALS TAB 1 TABLET: TAB ORAL at 08:05

## 2023-05-03 RX ADMIN — DEXMEDETOMIDINE HYDROCHLORIDE 1.2 MCG/KG/HR: 100 INJECTION, SOLUTION INTRAVENOUS at 14:28

## 2023-05-03 RX ADMIN — DEXMEDETOMIDINE HYDROCHLORIDE 1.2 MCG/KG/HR: 100 INJECTION, SOLUTION INTRAVENOUS at 04:48

## 2023-05-03 RX ADMIN — NALOXONE HYDROCHLORIDE 4 MG: 4 SPRAY NASAL at 19:32

## 2023-05-03 RX ADMIN — NICOTINE 1 PATCH: 21 PATCH, EXTENDED RELEASE TRANSDERMAL at 08:05

## 2023-05-03 RX ADMIN — DEXMEDETOMIDINE HYDROCHLORIDE 1.2 MCG/KG/HR: 400 INJECTION INTRAVENOUS at 04:48

## 2023-05-03 RX ADMIN — SODIUM CHLORIDE 100 ML/HR: 0.9 INJECTION, SOLUTION INTRAVENOUS at 03:01

## 2023-05-03 NOTE — ASSESSMENT & PLAN NOTE
· Patient reports daily heroin use  · Unable to obtain a full history due to patient's medication condition   · History of IVDU   · Per ED, patient agreeable to detox transfer, unclear if interested in suboxone induction     · Case management for assistance in discharge planning   · Hepatitis panel and HIV screen ordered  · See Opioid Withdrawal

## 2023-05-03 NOTE — ED PROVIDER NOTES
History  Chief Complaint   Patient presents with   • Medical Problem     Patient states that she was given a shot of suboxone  HPI  This is a 67-year-old female with a past medical history significant for illicit opioid abuse who presents to the emergency department via EMS from home for evaluation of acute agitated state  Patient reports that she is a chronic daily intravenous heroin user  This history is limited by the acuity of condition  The patient reports that she had recently been using heroin  The last known use is unclear  The patient this evening reportedly injected herself with Suboxone or buprenorphine  Upon administration patient began with severe withdrawal symptoms she describes restlessness and agitation uncontrollable movements  Patient denies a history of the symptoms in the past   Denies vomiting  Denies chest pain  Denies focal weakness  EMS provides a similar story they do not have any further details they noted the patient was combative throughout the entire prehospital encounter and transfer to the emergency department and the PDL was had to be involved  Prior to Admission Medications   Prescriptions Last Dose Informant Patient Reported? Taking? RA Col-Rite 100 MG capsule   Yes No   Sig: take 1 capsule by mouth twice a day for 5 days   ibuprofen (MOTRIN) 600 mg tablet   No No   Sig: Take 1 tablet (600 mg total) by mouth every 6 (six) hours as needed for mild pain or moderate pain   Patient not taking: Reported on 5/18/2022      Facility-Administered Medications: None       Past Medical History:   Diagnosis Date   • Hormone imbalance     Pt states she has had since a teenager     • Wears contact lenses        Past Surgical History:   Procedure Laterality Date   • CHOLECYSTECTOMY     • GALLBLADDER SURGERY      removed 7-8 years ago    • TN LAP, INCISIONAL HERNIA REPAIR,REDUCIBLE N/A 4/21/2022    Procedure: REPAIR HERNIA INCISIONAL LAPAROSCOPIC WITH MESH;  Surgeon: Gino Chavarria Lainey Palacios MD;  Location: [facility];  Service: General   • TONSILLECTOMY         Family History   Problem Relation Age of Onset   • No Known Problems Father    • No Known Problems Brother    • No Known Problems Brother      I have reviewed and agree with the history as documented  E-Cigarette/Vaping   • E-Cigarette Use Never User      E-Cigarette/Vaping Substances   • Nicotine No    • THC No    • CBD No    • Flavoring No    • Other No    • Unknown No      Social History     Tobacco Use   • Smoking status: Every Day     Packs/day: 0 50     Types: Cigarettes   • Smokeless tobacco: Never   Vaping Use   • Vaping Use: Never used   Substance Use Topics   • Alcohol use: Never   • Drug use: Never       Review of Systems   Unable to perform ROS: Acuity of condition   Constitutional: Positive for activity change and chills  Negative for diaphoresis  Eyes: Negative for visual disturbance  Respiratory: Negative for shortness of breath  Cardiovascular: Negative for chest pain  Gastrointestinal: Positive for nausea  Negative for abdominal pain, diarrhea and vomiting  Endocrine: Positive for cold intolerance  Musculoskeletal: Positive for arthralgias and myalgias  Neurological: Positive for tremors  Negative for dizziness, seizures, weakness and headaches  Physical Exam  Physical Exam  Vitals and nursing note reviewed  Exam conducted with a chaperone present  Constitutional:       General: She is in acute distress  Appearance: She is obese  She is ill-appearing and toxic-appearing  She is not diaphoretic  HENT:      Head: Normocephalic and atraumatic  Right Ear: External ear normal       Left Ear: External ear normal       Nose: Nose normal       Mouth/Throat:      Pharynx: Oropharynx is clear  Eyes:      General: No scleral icterus  Extraocular Movements: Extraocular movements intact        Conjunctiva/sclera: Conjunctivae normal       Pupils: Pupils are equal, round, and reactive to light  Cardiovascular:      Rate and Rhythm: Normal rate and regular rhythm  Pulses: Normal pulses  Heart sounds: Normal heart sounds  Pulmonary:      Effort: Pulmonary effort is normal  No respiratory distress  Abdominal:      General: Abdomen is flat  There is no distension  Tenderness: There is no abdominal tenderness  Genitourinary:     General: Normal vulva  Rectum: Normal    Musculoskeletal:      Cervical back: Normal range of motion and neck supple  No rigidity  Right lower leg: No edema  Left lower leg: No edema  Comments: No external signs of trauma   Skin:     General: Skin is warm and dry  Capillary Refill: Capillary refill takes less than 2 seconds  Neurological:      Cranial Nerves: No dysarthria or facial asymmetry  Sensory: Sensation is intact  Motor: Tremor and abnormal muscle tone present  Comments: No clonus   Psychiatric:         Attention and Perception: She does not perceive auditory or visual hallucinations  Mood and Affect: Mood is anxious  Behavior: Behavior is uncooperative, agitated, aggressive, hyperactive and combative  Cognition and Memory: Cognition is impaired  Memory is impaired  Judgment: Judgment is impulsive           Vital Signs  ED Triage Vitals [05/02/23 2000]   Temperature Pulse Resp BP SpO2   98 2 °F (36 8 °C) 99 -- -- 93 %      Temp Source Heart Rate Source Patient Position - Orthostatic VS BP Location FiO2 (%)   Temporal Monitor -- -- --      Pain Score       --           Vitals:    05/02/23 2000   Pulse: 99         Visual Acuity      ED Medications  Medications   ondansetron (ZOFRAN) injection 4 mg (4 mg Intravenous Not Given 5/2/23 2253)   lactated ringers bolus 1,000 mL (has no administration in time range)   dexmedeTOMIDine (Precedex) 400 mcg in sodium chloride 0 9% 100 mL (0 4 mcg/kg/hr × 101 kg Intravenous Rate/Dose Change 5/2/23 2300)   midazolam (VERSED) injection 2 mg (2 mg Intramuscular Given 5/2/23 1940)   cloNIDine (CATAPRES) tablet 0 2 mg (0 2 mg Oral Given 5/2/23 1950)   diphenhydrAMINE (BENADRYL) injection 50 mg (50 mg Intramuscular Given 5/2/23 1949)   morphine injection 4 mg (4 mg Intramuscular Given 5/2/23 2017)   dexmedeTOMIDine (Precedex) 0 5 mcg/kg in sodium chloride 0 9 % 50 mL bolus ( Intravenous New Bag 5/2/23 2047)   midazolam (VERSED) injection 1 mg (1 mg Intravenous Given 5/2/23 2041)   morphine injection 8 mg (8 mg Intramuscular Given 5/2/23 2114)   droperidol (INAPSINE) injection 2 5 mg (2 5 mg Intramuscular Given 5/2/23 2145)       Diagnostic Studies  Results Reviewed     Procedure Component Value Units Date/Time    Rapid HIV 1/2 AB-AG Combo for 15 yr old and above [[id number]]  (Normal) Collected: 05/02/23 2034    Lab Status: Final result Specimen: Blood from Arm, Right Updated: 05/02/23 2120     Rapid HIV 1 AND 2 Non-Reactive     HIV-1 P24 Ag Screen Non-Reactive    Narrative:      Negative for HIV-1 p24 Antigen  Negative for HIV-1 and/or HIV-2 Antibody      Ethanol [[id number]]  (Normal) Collected: 05/02/23 2034    Lab Status: Final result Specimen: Blood from Arm, Right Updated: 05/02/23 2101     Ethanol Lvl <10 mg/dL     Basic metabolic panel [[id number]] Collected: 05/02/23 2034    Lab Status: Final result Specimen: Blood from Arm, Right Updated: 05/02/23 2100     Sodium 138 mmol/L      Potassium 3 9 mmol/L      Chloride 102 mmol/L      CO2 23 mmol/L      ANION GAP 13 mmol/L      BUN 12 mg/dL      Creatinine 1 13 mg/dL      Glucose 102 mg/dL      Calcium 9 6 mg/dL      eGFR 60 ml/min/1 73sq m     Narrative:      [place] guidelines for Chronic Kidney Disease (CKD):   •  Stage 1 with normal or high GFR (GFR > 90 mL/min/1 73 square meters)  •  Stage 2 Mild CKD (GFR = 60-89 mL/min/1 73 square meters)  •  Stage 3A Moderate CKD (GFR = 45-59 mL/min/1 73 square meters)  •  Stage 3B Moderate CKD (GFR = 30-44 mL/min/1 73 square meters)  • Stage 4 Severe CKD (GFR = 15-29 mL/min/1 73 square meters)  •  Stage 5 End Stage CKD (GFR <15 mL/min/1 73 square meters)  Note: GFR calculation is accurate only with a steady state creatinine    CK [[id number]]  (Abnormal) Collected: 05/02/23 2034    Lab Status: Final result Specimen: Blood from Arm, Right Updated: 05/02/23 2100     Total  U/L     CBC and differential [[id number]]  (Abnormal) Collected: 05/02/23 2034    Lab Status: Final result Specimen: Blood from Arm, Right Updated: 05/02/23 2043     WBC 6 38 Thousand/uL      RBC 4 92 Million/uL      Hemoglobin 14 4 g/dL      Hematocrit 42 2 %      MCV 86 fL      MCH 29 3 pg      MCHC 34 1 g/dL      RDW 11 9 %      MPV 9 1 fL      Platelets 246 Thousands/uL      nRBC 0 /100 WBCs      Neutrophils Relative 77 %      Immat GRANS % 0 %      Lymphocytes Relative 15 %      Monocytes Relative 5 %      Eosinophils Relative 2 %      Basophils Relative 1 %      Neutrophils Absolute 4 90 Thousands/µL      Immature Grans Absolute 0 02 Thousand/uL      Lymphocytes Absolute 0 96 Thousands/µL      Monocytes Absolute 0 31 Thousand/µL      Eosinophils Absolute 0 15 Thousand/µL      Basophils Absolute 0 04 Thousands/µL     Hepatitis B surface antigen [[id number]] Collected: 05/02/23 2034    Lab Status: In process Specimen: Blood from Arm, Right Updated: 05/02/23 2041    Hepatitis C antibody [[id number]] Collected: 05/02/23 2034    Lab Status:  In process Specimen: Blood from Arm, Right Updated: 05/02/23 2041    UA w Reflex to Microscopic w Reflex to Culture [[id number]]     Lab Status: No result Specimen: Urine, Clean Catch     Rapid drug screen, urine [[id number]]     Lab Status: No result Specimen: Urine                  No orders to display              Procedures  CriticalCare Time    Date/Time: 5/2/2023 10:54 PM  Performed by: Aline Velazquez DO  Authorized by: Aline Velazquez DO     Critical care provider statement:     Critical care time (minutes):  60    Critical care time was exclusive of:  Separately billable procedures and treating other patients and teaching time    Critical care was necessary to treat or prevent imminent or life-threatening deterioration of the following conditions:  Toxidrome    Critical care was time spent personally by me on the following activities:  Obtaining history from patient or surrogate, development of treatment plan with patient or surrogate, blood draw for specimens, discussions with consultants, discussions with primary provider, evaluation of patient's response to treatment, examination of patient, review of old charts, re-evaluation of patient's condition, ordering and review of radiographic studies, ordering and review of laboratory studies and ordering and performing treatments and interventions    I assumed direction of critical care for this patient from another provider in my specialty: no    ECG 12 Lead Documentation Only    Date/Time: 5/2/2023 10:55 PM  Performed by: Mahogany Blakely DO  Authorized by: Mahogany Blakely DO     Indications / Diagnosis:  Acute agitation, QTc evaluation prior to administration of droperidol and Precedex  ECG reviewed by me, the ED Provider: yes    Patient location:  ED  Previous ECG:     Previous ECG:  Unavailable    Comparison to cardiac monitor: No    Interpretation:     Interpretation: non-specific    Quality:     Tracing quality:  Limited by artifact  Rate:     ECG rate:  99    ECG rate assessment: normal    Rhythm:     Rhythm: sinus rhythm    Ectopy:     Ectopy: none    QRS:     QRS axis:  Normal  Conduction:     Conduction: normal    ST segments:     ST segments:  Normal  T waves:     T waves: normal    ECG 12 Lead Documentation Only    Date/Time: 5/2/2023 11:03 PM  Performed by: Mahogany Blakely DO  Authorized by: Mahogany Blakely DO     Indications / Diagnosis:  QTc monitoring while on Precedex, monitor for bradycardia on Precedex  ECG reviewed by me, the ED Provider: yes Patient location:  ED  Previous ECG:     Previous ECG:  Compared to current    Comparison ECG info:  No significant change to prior EKG dated 21:18    Similarity:  No change    Comparison to cardiac monitor: No    Interpretation:     Interpretation: non-specific    Quality:     Tracing quality:  Limited by artifact  Rate:     ECG rate:  115    ECG rate assessment: tachycardic    Rhythm:     Rhythm: sinus tachycardia    Ectopy:     Ectopy: none    QRS:     QRS axis:  Normal  Conduction:     Conduction: normal    ST segments:     ST segments:  Normal  T waves:     T waves: normal    US Guided Peripheral IV    Date/Time: 5/2/2023 9:07 PM  Performed by: Haley Hill DO  Authorized by: Haley Hill DO     Patient location:  ED  Performed by: Attending  Other Assisting Provider: No    Indications:     Indications: difficulty obtaining IV access      Image availability:  Not obtained due to urgency  Procedure details:     Patient evaluated for contraindications to access (i e  fistula, thrombosis, etc): Yes      Standard clean technique used for ultrasound access: Yes      Location:  Right forearm    Catheter size:  18 gauge    Number of attempts:  1    Successful placement: yes    Post-procedure details:     Post-procedure:  Dressing applied    Assessment: free fluid flow and no signs of infiltration      Post-procedure complications: none      Patient tolerance of procedure: Tolerated with difficulty  US Guided Peripheral IV    Date/Time: 5/2/2023 10:07 AM  Performed by: Haley Hill DO  Authorized by: Haley Hill DO     Patient location:  ED  Performed by:   Attending  Other Assisting Provider: No    Indications:     Indications: difficulty obtaining IV access      Image availability:  Not obtained due to urgency  Procedure details:     Patient evaluated for contraindications to access (i e  fistula, thrombosis, etc): Yes      Standard clean technique used for ultrasound access: Yes      Location:  Left forearm    Catheter size:  20 gauge    Number of attempts:  1    Successful placement: yes    Post-procedure details:     Post-procedure:  Dressing applied and securement device placed    Assessment: free fluid flow and no signs of infiltration      Post-procedure complications: none      Patient tolerance of procedure: Tolerated with difficulty             ED Course  ED Course as of 05/02/23 2310   e May 02, 2023   2140 EKG interpreted by myself  EKG dated 5/2/2023 at 2118 demonstrates normal sinus rhythm at 97 bpm, normal MS, QRS, QT intervals, no STEMI  No prior EKG for comparison  Medical Decision Making  23-year-old female presenting with combativeness and agitation the results of acute precipitated opioid withdrawal after patient administered Suboxone or buprenorphine to herself while recently using heroin  Patient presents agitated much difficulty establishing safety in the room requiring physical restraint by staff members and numerous rounds of intramuscular medications for acute agitated state  Vital signs unremarkable for the situation  Difficulty establishing IV access or obtain an EKG due to the acuity of the condition  Case discussed early on with toxicology on-call, Dr Radha Alex, who advocated for transfer to the detox unit and initiation of Precedex for sedation  After several rounds of intramuscular Versed, Benadryl and unable to establish IV access to administer Precedex decision was made to utilize intramuscular morphine for opioid replacement which was unsuccessful  After much difficulty IV access was established by myself in the right forearm and Precedex was initiated but the IV then infiltrated and the infusion was immediately stopped    The patient was administered intramuscular droperidol with some effect and after much time counseling the patient we were able to reestablish IV in the left forearm and the patient received the rest of the Precedex bolus and was placed on titratable infusion  Case was discussed with transfer center and the advanced practitioner at the detox unit via [employer] text and they agreed to accept the patient as previously discussed with Dr Anruag Singh who also had discussed the case with the [facility]  The patient was agreeable to the plan for transfer to the detox unit  There was no signs of trauma or seizure or other acute emergency at this time and her symptoms were felt to be the result of acute opioid withdrawal and not due to some other psychiatric disorder at this time  Acute opioid withdrawal ([facility] [address] ): complicated acute illness or injury with systemic symptoms that poses a threat to life or bodily functions  Agitation requiring sedation protocol: complicated acute illness or injury with systemic symptoms that poses a threat to life or bodily functions  Amount and/or Complexity of Data Reviewed  Independent Historian: EMS  Labs: ordered  Decision-making details documented in ED Course  ECG/medicine tests: ordered and independent interpretation performed  Decision-making details documented in ED Course  Risk  Prescription drug management  Parenteral controlled substances  Drug therapy requiring intensive monitoring for toxicity  Decision regarding hospitalization  Diagnosis or treatment significantly limited by social determinants of health            Disposition  Final diagnoses:   Acute opioid withdrawal ([facility] [address] ) - precipitated withdrawal due to illicit use of suboxone in patient with current chronic opioid abuse   Agitation requiring sedation protocol     Time reflects when diagnosis was documented in both MDM as applicable and the Disposition within this note     Time User Action Codes Description Comment    5/2/2023  7:46 PM Ella Parks Add [F11 93] Acute opioid withdrawal ([address] )     5/2/2023  8:47 PM Ella Taveras [F43 0] Agitation states as acute reaction to exceptional (gross) stress     5/2/2023  8:47 PM Hingham Carlyle Remove [F43 0] Agitation states as acute reaction to exceptional (gross) stress     5/2/2023  8:48 PM Nidhi Tadeo Add [R45 1] Agitation requiring sedation protocol     5/2/2023  8:49 PM Hingham Carlyle Modify [F11 93] Acute opioid withdrawal ([facility] [address] ) precipitated withdrawal due to illicit use of suboxone in patient with current chronic opioid abuse      ED Disposition     ED Disposition   Transfer to Another Facility-In Network    Condition   --    Date/Time   Tue May 2, 2023 10:44 PM    Comment   Merna Bernabe should be transferred out to [facility]) [address]  Follow-up Information    None         Patient's Medications   Discharge Prescriptions    No medications on file       No discharge procedures on file      PDMP Review       Value Time User    PDMP Reviewed  Yes 4/21/2022  9:01 AM Zoe Colon PA-C          ED Provider  Electronically Signed by           Cheryl Rowland DO  05/02/23 1814

## 2023-05-03 NOTE — ASSESSMENT & PLAN NOTE
· CK initially 223, increased to 712 at time of admission  · In the setting of acute withdrawal and being restrained in ED  · Cr appears to be at baseline, minimal historical data     · IV hydration   · Continue to monitor

## 2023-05-03 NOTE — PLAN OF CARE
Problem: MOBILITY - ADULT  Goal: Maintain or return to baseline ADL function  Description: INTERVENTIONS:  -  Assess patient's ability to carry out ADLs; assess patient's baseline for ADL function and identify physical deficits which impact ability to perform ADLs (bathing, care of mouth/teeth, toileting, grooming, dressing, etc )  - Assess/evaluate cause of self-care deficits   - Assess range of motion  - Assess patient's mobility; develop plan if impaired  - Assess patient's need for assistive devices and provide as appropriate  - Encourage maximum independence but intervene and supervise when necessary  - Involve family in performance of ADLs  - Assess for home care needs following discharge   - Consider OT consult to assist with ADL evaluation and planning for discharge  - Provide patient education as appropriate  Outcome: Progressing  Goal: Maintains/Returns to pre admission functional level  Description: INTERVENTIONS:  - Perform BMAT or MOVE assessment daily    - Set and communicate daily mobility goal to care team and patient/family/caregiver     - Collaborate with rehabilitation services on mobility goals if consulted  - Out of bed for toileting  - Record patient progress and toleration of activity level   Outcome: Progressing     Problem: Prexisting or High Potential for Compromised Skin Integrity  Goal: Skin integrity is maintained or improved  Description: INTERVENTIONS:  - Identify patients at risk for skin breakdown  - Assess and monitor skin integrity  - Assess and monitor nutrition and hydration status  - Monitor labs   - Assess for incontinence   - Turn and reposition patient  - Assist with mobility/ambulation  - Relieve pressure over bony prominences  - Avoid friction and shearing  - Provide appropriate hygiene as needed including keeping skin clean and dry  - Evaluate need for skin moisturizer/barrier cream  - Collaborate with interdisciplinary team   - Patient/family teaching  - Consider wound care consult   Outcome: Progressing     Problem: SUBSTANCE USE/ABUSE  Goal: By discharge, will develop insight into their chemical dependency and sustain motivation to continue in recovery  Description: INTERVENTIONS:  - Attends all daily group sessions and scheduled AA groups  - Actively practices coping skills through participation in the therapeutic community and adherence to program rules  - Reviews and completes assignments from individual treatment plan  - Assist patient development of understanding of their personal cycle of addiction and relapse triggers  Outcome: Progressing  Goal: By discharge, patient will have ongoing treatment plan addressing chemical dependency  Description: INTERVENTIONS:  - Assist patient with resources and/or appointments for ongoing recovery based living  Outcome: Progressing

## 2023-05-03 NOTE — H&P
HISTORY & PHYSICAL EXAM  DEPARTMENT OF MEDICAL TOXICOLOGY  LEVEL 4 MEDICAL DETOX UNIT  Hiram Gonzalez 34 y o  female MRN: [id number]  Unit/Bed#: 5T [facility] 515-01 Encounter: [id number]      Reason for Admission/Principal Problem: Opioid withdrawal, opioid use disorder  Admitting Provider: Neda Stephesn PA-C  Attending Provider: Enriqueta Pelayo MD   5/3/2023  1:26 AM      * Opioid withdrawal ([place] [address] )  Assessment & Plan  · Patient reports chronic opioid use  · Last use unknown   · Per ED- patient reported injecting suboxone and feeling much worse afterwards   · Unable to get a clear history due to patients condition  · Received a total of 12 mg morphine for opioid replacement without effect and multiple PRNs   · Patient arrived to detox unit on Precedex 0 7mcg/kg/hr   · Patient continued to be agitated and restless, continually kicking legs and rolling around   · Currently on Precedex 1 2 mcg/kg/hr- resting more comfortably put continues to kick legs frequently     · Plan to start suboxone micro-induction 24-48 hours after last use   · Unclear if patient is willing to be started on suboxone, reassess once clinically able to  · Symptomatic and supportive management   · Continuous pulse oximetry monitoring      Opioid use disorder, severe, dependence ([place] [address] )  Assessment & Plan  · Patient reports daily heroin use  · Unable to obtain a full history due to patient's medication condition   · History of IVDU   · Per ED, patient agreeable to detox transfer, unclear if interested in suboxone induction     · Case management for assistance in discharge planning   · Hepatitis panel and HIV screen ordered  · See Opioid Withdrawal       Type 2 diabetes mellitus ([place] [address] )  Assessment & Plan  Lab Results   Component Value Date    HGBA1C 6 3 (H) 04/20/2018       No results for input(s): POCGLU in the last 72 hours      Blood Sugar Average: Last 72 hrs:  ·   · Per chart review, patient with history of diabetes, not on any current medications   · Most recent Hgb A1c 6 3 on 4/20/2018  · Hgb A1c pending     HTN (hypertension)  Assessment & Plan  · Per chart review, patient with HTN, currently not on any medication   · Patient noted to hypertension on initial vitals  · In the setting of acute withdrawal   · Continue to monitor and consider initiating antihypertensive therapy if not improving after withdrawal has resolved    Tobacco use  Assessment & Plan  · Patient endorses daily tobacco use  · Cessation encouraged  · NRT ordered            Additional medical treatment(s) includes none       VTE Prophylaxis: Enoxaparin (Lovenox)  / sequential compression device   Code Status: Full Code      Anticipated Length of Stay:  Patient will be admitted on an Inpatient basis with an anticipated length of stay of  2-3  midnights  Justification for Hospital Stay: Opioid withdrawal     For any questions or concerns, please Tiger Text the advanced practitioner in the role of [facility]-DETOX-AP On Call      This patient qualifies for Level IV medically managed intensive inpatient services under the criteria set by the American Society of Addiction Medicine, including dimensions 1-3  The patient is in withdrawal (or is intoxicated with high risk of withdrawal), with severe and unstable medical and/or psychiatric (dual diagnosis) problems, requiring requires 24-hour medical and nursing care and the full resources of a [address] patient to medical detox unit and continue supportive care and stabilization of acute opioid withdrawal per medical toxicology/detox medication assisted treatment pathway       Complicated Opioid Withdrawal (ie associated with long acting agents, such as methadone or illicit fentanyl analogues):   >72 hours: Routine COWS/induction as per uncomplicated opoid withdrawal (below)   <72 hours:   Adjunctive medications (see below), including clonazepam 1-2mg Q6 hrs PRN anxiety (or diazepam 5 mg if cannot take PO)   >48 hours, test dose buprenorphine 0 5-1mg   If responds well, continue with 2mg Q2 hrs (total 8mg) holding for worsening withdrawal, then start maintenance dosing    If responds poorly, follow next step below    <48 hours and/or COWS < 6 or failed test dose, add Butrans transdermal patch 20-40mcg/hr, monitor for signs/symptoms of withdrawal    If within first 24-48 hrs, can administer buprenorphine 0 5-1mg Q6 hrs x 4, followed by 2mg Q2 hrs x 4 the next day   If surpassing 48 hrs, can administer buprenorphine 2mg Q2hrs if tolerated without transdermal patch or lower sublingual dose   When complete, remove transdermal patch and start maintenance dosing    For moderate-severe withdrawal (COWS >/= 8, may still consider routine induction with buprenorphine 8 mg if appropriate   For worsened or precipitated withdrawal, consider adjunctive benzodiazepines and other comfort meds  Dexmedetomidine may be considered for severe precipitated withdrawal          Uncomplicated Opioid Withdrawal:  Monitor opioid severity via Clinical Opioid Withdrawal Scale (COWS) Q4 hours and administer buprenorphine/naloxone 8mg/2mg when COWS >8, or when greater than 24 hours have elapsed from most recent opioid use (excluding long-acting opioids, such as methadone)  Continue to monitor opioid severity Q30-60 minutes after first dose and administer additional buprenorphine 2-4mg every 30-60 minutes until COWS < 8 for two consecutive hours                Adjunctive medications administered as needed:  Clonidine 0 1 mg PO Q6 hours PRN anxiety or palpitations    Gabapentin 300mg PO Q8 hours PRN anxiety    Ibuprofen 600 mg PO Q6 hours PRN pain    Acetaminophen 1000mg PO Q8 hours PRN pain    Ondansetron 4 mg PO Q6 hours PRN N/V    Nicotine patch 7, 14, 21 mg  PRN nicotine withdrawal   Trazodone 50 mg PO QHS PRN sleep    Loperamide 4 mg PO PRN diarrhea up to 16 mg/day       The risks, benefits and mechanism of buprenorphine/naloxone were discussed and patient agreed to treatment  Case management consultation will take place to assist with coordination of subsequent treatment after discharge  Administer daily multivitamin  Evaluate and treat for coexisting substance use, such as nicotine  Discuss risk factors for infectious disease, such as history of intravenous drug abuse, and offer hepatitis and HIV screening if indicated  Hx and PE limited by: Patient on sedating medication and agitated at time of admission     HPI: Sulma Turcios is a 34y o  year old female with PMHx of OUD, HTN, and diabetes, who presented to the [facility] ED due to precipitated withdrawal  Per ED providers, patient use Suboxone intravenously after recent heroin use and started feeling very sick  Patient was severely agitated and restless in the ED, requiring many PRN medications  Patient was placed on Precedex with some improvement in symptoms prior to transport  Patient received a total of 12 mg morphine for opioid replacement in the ED without improvement  Patient was admitted to [address] [place] [address] detox unit for opioid detoxification  On arrival to the unit, patient was on Precedex 0 7 mcg/kg/hr and continued to be restless, kicking her legs and rolling around in bed  Patient was titrated up to 1 2 mcg/kg/hr when she reached intended level of sedition  Most of the patient's history had to obtained from the ED providers and chart view due to patient's condition  Patient was able to answer a few very similar questions but unable to give a whole history due to sedative medications  Opioids currently used: heroin  Route of use: intravenous  Date/Time of Last Opioid Use: Unknown, last dose of morphine given 2100 on 5/2  Current Signs/Symptoms of Opioid Withdrawal: restlessness    COWS score:   Clinical Opiate Withdrawal Scale  Pulse: 103      Methadone & Buprenorphine History  History of prior treatment for opioid dependence? "Unknown   Currently on Methadone Maintenance? No  History of prior treatment with Suboxone? Unknown  Currently taking Suboxone? Took single dose at home IV  History of using Suboxone without having a prescription? Unknown   History of IVDA? yes  Co-existing substance use? Denies     Review of PDMP: yes    Social History     Substance and Sexual Activity   Alcohol Use Never     Social History     Substance and Sexual Activity   Drug Use Yes    Types: Fentanyl, Heroin    Comment: 3 bags/day     Social History     Tobacco Use   Smoking Status Every Day    Packs/day: 0 50    Types: Cigarettes   Smokeless Tobacco Never       Review of Systems   Unable to perform ROS: Acuity of condition   Respiratory: Negative for chest tightness  Gastrointestinal: Negative for abdominal pain  Psychiatric/Behavioral: Positive for agitation  Historical Information   Past Medical History:   Diagnosis Date    Hormone imbalance     Pt states she has had since a teenager   Wears contact lenses      Past Surgical History:   Procedure Laterality Date    CHOLECYSTECTOMY      GALLBLADDER SURGERY      removed 7-8 years ago     RI LAP RPR HRNA XCPT INCAL/INGUN NCRC8/STRANGULATED N/A 4/21/2022    Procedure: REPAIR HERNIA INCISIONAL LAPAROSCOPIC WITH MESH;  Surgeon: Vincenza Boast, MD;  Location: [facility];  Service: General    TONSILLECTOMY       Family History   Problem Relation Age of Onset    No Known Problems Father     No Known Problems Brother     No Known Problems Brother      Social History   Marital Status: Single   Patient Pre-hospital Living Situation: Unknown   Patient Pre-hospital Level of Mobility: Independent   Patient Pre-hospital Diet Restrictions: None    Allergies   Allergen Reactions    Codeine Itching     Pt feels like her skin is \"crawling\"    Amoxicillin Hives       Prior to Admission medications    Medication Sig Start Date End Date Taking?  Authorizing Provider   ibuprofen (MOTRIN) 600 mg tablet " Take 1 tablet (600 mg total) by mouth every 6 (six) hours as needed for mild pain or moderate pain  Patient not taking: Reported on 5/18/2022 4/21/22   Jose Francisco Colon PA-C RA Col-Rite 100 MG capsule take 1 capsule by mouth twice a day for 5 days  Patient not taking: Reported on 5/3/2023 4/21/22   Historical Provider, MD       Current Facility-Administered Medications   Medication Dose Route Frequency    acetaminophen (TYLENOL) tablet 650 mg  650 mg Oral Q6H PRN    cloNIDine (CATAPRES) tablet 0 1 mg  0 1 mg Oral Q6H PRN    dexmedeTOMIDine (Precedex) 400 mcg in sodium chloride 0 9% 100 mL  0 1-1 2 mcg/kg/hr Intravenous Titrated    enoxaparin (LOVENOX) subcutaneous injection 40 mg  40 mg Subcutaneous Daily    gabapentin (NEURONTIN) capsule 300 mg  300 mg Oral Q8H PRN    multivitamin-minerals (CENTRUM) tablet 1 tablet  1 tablet Oral Daily    nicotine (NICODERM CQ) 21 mg/24 hr TD 24 hr patch 1 patch  1 patch Transdermal Daily    ondansetron (ZOFRAN) injection 4 mg  4 mg Intravenous Q6H PRN    traZODone (DESYREL) tablet 50 mg  50 mg Oral HS PRN       Continuous Infusions:dexmedetomidine, 0 1-1 2 mcg/kg/hr, Last Rate: 1 2 mcg/kg/hr (05/03/23 0232)             Objective     No intake or output data in the 24 hours ending 05/03/23 0247    Invasive Devices:   Peripheral IV 05/02/23 Right Arm (Active)   Site Assessment WDL; Clean;Dry; Intact 05/02/23 2034   Dressing Type Transparent 05/02/23 2034   Line Status Blood return noted; Flushed 05/02/23 2034   Dressing Status Clean;Dry; Intact 05/02/23 2034       Peripheral IV 05/03/23 Dorsal (posterior); Left Forearm (Active)       Vitals   Vitals:    05/03/23 0139   BP: 155/88   TempSrc: Temporal   Pulse: 103   Resp: 18   Patient Position - Orthostatic VS: Lying   Temp: (!) 97 4 °F (36 3 °C)       Physical Exam  Constitutional:       Appearance: She is overweight  She is ill-appearing  Interventions: She is sedated     Eyes:      Comments: Pupils constricted bilaterally   Cardiovascular:      Rate and Rhythm: Normal rate and regular rhythm  Pulses: Normal pulses  Heart sounds: Normal heart sounds  No murmur heard  No gallop  Pulmonary:      Effort: Pulmonary effort is normal  No respiratory distress  Breath sounds: Normal breath sounds  No wheezing or rales  Abdominal:      General: Abdomen is flat  Bowel sounds are normal  There is no distension  Palpations: Abdomen is soft  Tenderness: There is no abdominal tenderness  There is no guarding  Musculoskeletal:         General: Normal range of motion  Skin:     General: Skin is warm and dry  Capillary Refill: Capillary refill takes less than 2 seconds  Neurological:      Mental Status: She is easily aroused  Comments: Unable to assess due to sedation and agitation   Psychiatric:         Behavior: Behavior is agitated  Comments: Unable to assess due to sedatives and agitation          DATA    EKG, Pathology, and Other Studies: I have personally reviewed pertinent reports  Lab Results: I have personally reviewed pertinent reports          CBC ETOH     Lab Results   Component Value Date    WBC 10 50 (H) 05/03/2023    WBC 6 6 04/20/2018    RBC 4 58 05/03/2023    RBC 4 74 04/20/2018    HGB 13 3 05/03/2023    HGB 13 9 04/20/2018    HCT 38 4 05/03/2023    HCT 41 9 04/20/2018    MCV 84 05/03/2023    MCV 88 3 04/20/2018    MCH 29 0 05/03/2023    MCH 29 3 04/20/2018    MCHC 34 6 05/03/2023    MCHC 33 1 04/20/2018    RDW 11 7 05/03/2023    RDW 12 8 04/20/2018     05/03/2023     04/20/2018    MPV 9 5 05/03/2023    MPV 8 5 (L) 04/20/2018      No results found for: LACTICACID   CMP UA         Component Value Date/Time     04/20/2018 1011    K 3 9 05/02/2023 2034    K 4 5 04/20/2018 1011     05/02/2023 2034     (H) 04/20/2018 1011    CO2 23 05/02/2023 2034    CO2 25 04/20/2018 1011    BUN 12 05/02/2023 2034    BUN 14 04/20/2018 1011    CREATININE 1 13 05/02/2023 2034    CREATININE 0 96 04/20/2018 1011         Component Value Date/Time    CALCIUM 9 6 05/02/2023 2034    CALCIUM 9 3 04/20/2018 1011    ALKPHOS 86 03/16/2022 0451    ALKPHOS 73 04/20/2018 1011    AST 20 03/16/2022 0451    AST 17 04/20/2018 1011    ALT 29 03/16/2022 0451    ALT 25 04/20/2018 1011    BILITOT 0 8 04/20/2018 1011      Lab Results   Component Value Date    CLARITYU Clear 03/16/2022    COLORU Yellow 03/16/2022    SPECGRAV 1 020 03/16/2022    PHUR 7 0 03/16/2022    GLUCOSEU Negative 03/16/2022    KETONESU Negative 03/16/2022    BLOODU Negative 03/16/2022    PROTEIN UA Negative 03/16/2022    NITRITE Negative 03/16/2022    BILIRUBINUR Negative 03/16/2022    UROBILINOGEN 0 2 03/16/2022    LEUKOCYTESUR Negative 03/16/2022        Liver Function Test: ASA     Lab Results   Component Value Date    TBILI 0 93 03/16/2022    ALKPHOS 86 03/16/2022    ALKPHOS 73 04/20/2018    AST 20 03/16/2022    AST 17 04/20/2018    ALT 29 03/16/2022    ALT 25 04/20/2018    TP 6 7 03/16/2022    ALB 4 2 03/16/2022    ALB < 0 7 04/20/2018    ALB 4 2 04/20/2018      No results found for: SALICYLATE   Troponin APAP     No results found for: TROPONINI   No results found for: ACTMNPHEN   VBG HCG     No results found for: PHVEN, LNR6XIS, PO2VEN, WTV0QPD, BEVEN, O4EVCKMJW, X2BUTEZ   No results found for: HCGQUANT   ABG Urine Drug Screen     No results found for: PHART, SGN4EVP, PO2ART, LDU0XAD, BEART, X1UCLFMDI, O2HGB, SOURC, IRIS, VTAC, ACRATE, INSPIREDAIR, PEEP   No results found for: AMPMETHUR, BARBTUR, BDZUR, COCAINEUR, METHADONEUR, OPIATEUR, PCPUR, THCUR, OXYCODONEUR   Lactate INR     No results found for: LACTICACID   No results found for: INR   PTT Protime     No results found for: PTT   No results found for: PROTIME   Hepatitis HIV     No results found for: HEPBSAG, HEPCAB   Lab Results   Component Value Date    [id number] Non-Reactive 05/02/2023    [id number] Non-Reactive 05/02/2023            Imaging Studies: I have personally reviewed pertinent reports  Counseling / Coordination of Care  Total floor / unit time spent today 90 minutes  Greater than 50% of total time was spent with the patient and / or family counseling and / or coordination of care  Minutes of critical care time 50  -Critical care time was exclusive of separately billable procedures and teaching time    -Critical care was necessary to treat or prevent imminent or life-threatening deterioration of the following condition: CNS failure/compromise, toxidrome (opioid withdrawal), withdrawal  -Critical care time was spent personally by me on the following activities as well as the above as per the course and rest of chart: obtaining history from patient/surrogate, development of a treatment plan, discussions with referring provider(s), evaluation of patient's response to the treatment, examination of the patient, performing  treatments and interventions, re-evaluation of the patient's condition, review of old charts, ordering/interpreting laboratory studies, ordering/interpreting of radiographic studies  ** Please Note: This note has been constructed using a voice recognition system   **

## 2023-05-03 NOTE — ASSESSMENT & PLAN NOTE
Recent Labs     05/02/23 2034 05/03/23  0239   WBC 6 38 10 50*     · Leukocytosis noted at time of admission  · In the setting of acute withdrawal  · Patient afebrile without signs of infection  · Continue to monitor

## 2023-05-03 NOTE — CASE MANAGEMENT
Worker attempted to complete assessment, pt not able to complete assessment at this time due to necessary medical interventions

## 2023-05-03 NOTE — ASSESSMENT & PLAN NOTE
· Per chart review, patient with HTN, currently not on any medication   · Patient noted to hypertension on initial vitals  · In the setting of acute withdrawal   · Continue to monitor and consider initiating antihypertensive therapy if not improving after withdrawal has resolved

## 2023-05-03 NOTE — EMTALA/ACUTE CARE TRANSFER
[facility] EMERGENCY DEPARTMENT  [address] [place] [id number]  Dept: [phone]      EMTALA TRANSFER CONSENT    NAME Alexandre Emmanuel                                         1985                              MRN [id number]    I have been informed of my rights regarding examination, treatment, and transfer   by Dr Stiven Galicia DO    Benefits:  Benefits of treatment/interventions not available at this facility (medical detox unit, toxicology consultation)    Risks:  Risk of decompensated illness during transport delays in care due to transport times or injury sustained during transportation      Consent for Transfer:  I acknowledge that my medical condition has been evaluated and explained to me by the emergency department physician or other qualified medical person and/or my attending physician, who has recommended that I be transferred to the service of    at    The above potential benefits of such transfer, the potential risks associated with such transfer, and the probable risks of not being transferred have been explained to me, and I fully understand them  The doctor has explained that, in my case, the benefits of transfer outweigh the risks  I agree to be transferred  I authorize the performance of emergency medical procedures and treatments upon me in both transit and upon arrival at the receiving facility  Additionally, I authorize the release of any and all medical records to the receiving facility and request they be transported with me, if possible  I understand that the safest mode of transportation during a medical emergency is an ambulance and that the Hospital advocates the use of this mode of transport  Risks of traveling to the receiving facility by car, including absence of medical control, life sustaining equipment, such as oxygen, and medical personnel has been explained to me and I fully understand them      (AVELINO CORRECT BOX BELOW)  [ x ] the patient/family:         Based on these reasonable risks and benefits to the patient and/or the unborn child(denise), and based upon the information available at the time of the patient’s examination, I certify that the medical benefits reasonably to be expected from the provision of appropriate medical treatments at another medical facility outweigh the increasing risks, if any, to the individual’s medical condition, and in the case of labor to the unborn child, from effecting the transfer      X____________________________________________ DATE 05/02/23        TIME_______      ORIGINAL - SEND TO MEDICAL RECORDS   COPY - SEND WITH PATIENT DURING TRANSFER

## 2023-05-03 NOTE — DISCHARGE INSTR - OTHER ORDERS
For assistance in obtaining Substance Use treatment:    [facility]: [phone]  [place]: [address]: [facility]: [phone]  [place]: [phone]  [facility]: [facility] [address]: [facility]: [address] [place] [address] Street: [phone]    Alcoholics Anonymous: [phone]  Narcotics Anonymous: [phone]

## 2023-05-03 NOTE — DISCHARGE SUMMARY
Discharging Physician / Practitioner: Ephraim Howard PA-C  PCP: Daniel Pierce DO  Admission Date:   Admission Orders (From admission, onward)     Ordered        05/03/23 0131  Inpatient Admission  Once                      Discharge Date: 05/03/23    Medical Problems     Resolved Problems  Date Reviewed: 5/18/2022          Resolved    * (Principal) Opioid withdrawal ([address] ) 5/3/2023     Resolved by  Ephraim Howadr PA-C          Consultations During Hospital Stay:  · Case Management     Procedures Performed:   · None    Significant Findings / Test Results:   · Elevated CK  · Leukocytosis    Incidental Findings:   · None     Test Results Pending at Discharge (will require follow up):   · Hgb A1c      Outpatient Tests/Follow ups Requested:  · PCP follow up     Complications:  None    Reason for Admission: Opioid withdrawal     Hospital Course:     Johnny Wilcox is a 34 y o  female patient who originally presented to the hospital on 5/3/2023 due to opioid withdrawal  Patient used Suboxone shortly after intravenous heroin use, causing precipitated withdrawal  Patient was started on Precedex infusion in the ED and then transported to [address] detox unit for continued management  Patient was continued on Precedex infusion until the afternoon of 5/3 when she reported improvement in symptoms  Patient declined any medications including suboxone and requested discharge due to feeling better  Patient was monitored off of Precedex for 4 hours without recurrence of symptoms and cleared for discharge to home  Risks of leaving were discussed with patient and she voiced understanding  All questions were answered to patient's satisfaction  Patient was escorted to the front door by nursing staff  The patient, initially admitted to the hospital as inpatient, was discharged earlier than expected given the following: patient's condition greatly improved and patient requesting to be discharged      Please see above list of diagnoses and related plan for additional information  Condition at Discharge: good     Discharge Day Visit / Exam:     * Please refer to separate progress note for these details *    Discussion with Family: Spoke with patient regarding treatment plan    Discharge instructions/Information to patient and family:   See after visit summary for information provided to patient and family  Provisions for Follow-Up Care:  See after visit summary for information related to follow-up care and any pertinent home health orders  Disposition:     Home    For Discharges to [facility] SNF:   · Not Applicable to this Patient - Not Applicable to this Patient    Planned Readmission: None     Discharge Statement:  I spent 40 minutes discharging the patient  This time was spent on the day of discharge  I had direct contact with the patient on the day of discharge  Greater than 50% of the total time was spent examining patient, answering all patient questions, arranging and discussing plan of care with patient as well as directly providing post-discharge instructions  Additional time then spent on discharge activities  Discharge Medications:  See after visit summary for reconciled discharge medications provided to patient and family        ** Please Note: This note has been constructed using a voice recognition system **

## 2023-05-03 NOTE — ASSESSMENT & PLAN NOTE
· Patient reports chronic opioid use  · Last use unknown   · Per ED- patient reported injecting suboxone and feeling much worse afterwards   · Unable to get a clear history due to patients condition  · Received a total of 12 mg morphine for opioid replacement without effect and multiple PRNs   · Patient arrived to detox unit on Precedex 0 7mcg/kg/hr   · Patient continued to be agitated and restless, continually kicking legs and rolling around   · Currently on Precedex 1 2 mcg/kg/hr- resting more comfortably put continues to kick legs frequently     · Plan to start suboxone micro-induction 24-48 hours after last use   · Unclear if patient is willing to be started on suboxone, reassess once clinically able to  · Symptomatic and supportive management   · Continuous pulse oximetry monitoring

## 2023-05-03 NOTE — ASSESSMENT & PLAN NOTE
Lab Results   Component Value Date    HGBA1C 6 3 (H) 04/20/2018       No results for input(s): POCGLU in the last 72 hours      Blood Sugar Average: Last 72 hrs:  ·   · Per chart review, patient with history of diabetes, not on any current medications   · Most recent Hgb A1c 6 3 on 4/20/2018  · Hgb A1c pending

## 2023-05-03 NOTE — ED NOTES
Pt refused blood pressure  Continues to throw herself around in the litter and thrash her arm at staff while attempting to do blood pressure  Same for IV catheter  Attempted IV without success  After pulling IV catheter out and placing safety on catheter, pt flung her arm and the IV catheter was thrown in the room from her arm  Provider made aware and treated pt with PO and IM medications at this time  Patient is alert and oriented x4 and is able to use the commode with stand-by assistance        Chema Soto RN  05/02/23 2003

## 2023-05-03 NOTE — NURSING NOTE
All patient belongings returned, patient given AVS, patient given naloxone, IVs removed, patient escorted to lobby by RN

## 2023-05-03 NOTE — PROGRESS NOTES
"Pt adamantly explaining to RN that she, \"defintely wants to leave\"  RN explained to pt that due to to the medications she was receiving, we recommend she stays for close pt monitoring to ensure she is clinically appropriate/safe for discharge  Pt awake and oriented x4  Pt ambulated to bathroom independently without issue and sitting up eating dinner stating, \"I feel so much better than I did  I can't believe how sick I was! \" Pt verbalized understanding of recommendations from RN and provider, and willing to be monitored for next several hours  Will continue to monitor    "

## 2023-05-03 NOTE — NURSING NOTE
Pt arrived to floor with transport team and flight RN  Precedex gtt continued from bag started at [facility]  Svitlana Mccrary RN, Viri Vargas RN, and Lorrie Broussard PA-C all present in room for admission  Svitlana Mccrary RN verified initial setup of precedex gtt  Pt does not open eyes but kicks legs continuously

## 2023-05-03 NOTE — ED NOTES
Bib Jarretthew sister in law called and left number for Efrain Esparza to call her when she is able  [phone]  I explained that I could not give an update but would give her number to the patient        Sarah Sena RN  05/02/23 9849

## 2023-05-03 NOTE — ED NOTES
Pt continues to roll around in the bed, and appears very comfortable  Moans and cries very frequently   Pulls off her telemetry leads      Familia Baker RN  05/03/23 5666

## 2023-05-04 LAB
ATRIAL RATE: 111 BPM
ATRIAL RATE: 115 BPM
ATRIAL RATE: 97 BPM
P AXIS: 52 DEGREES
P AXIS: 72 DEGREES
P AXIS: 79 DEGREES
PR INTERVAL: 138 MS
PR INTERVAL: 142 MS
PR INTERVAL: 144 MS
QRS AXIS: -5 DEGREES
QRS AXIS: 12 DEGREES
QRS AXIS: 18 DEGREES
QRSD INTERVAL: 72 MS
QT INTERVAL: 344 MS
QT INTERVAL: 352 MS
QT INTERVAL: 354 MS
QTC INTERVAL: 449 MS
QTC INTERVAL: 475 MS
QTC INTERVAL: 478 MS
T WAVE AXIS: 26 DEGREES
T WAVE AXIS: 37 DEGREES
T WAVE AXIS: 58 DEGREES
VENTRICULAR RATE: 111 BPM
VENTRICULAR RATE: 115 BPM
VENTRICULAR RATE: 97 BPM

## 2023-05-04 NOTE — UTILIZATION REVIEW
Initial Clinical Review    Pt initially presented to [address] [facility] ED  Pt was transferred by EMS to [address] [place] [address] for its Level IV medically managed intensive inpatient detox unit, not available at [facility]  Admission: Date/Time/Statement:   Admission Orders (From admission, onward)     Ordered        05/03/23 0131  Inpatient Admission  Once                      Orders Placed This Encounter   Procedures    Inpatient Admission     Standing Status:   Standing     Number of Occurrences:   1     Order Specific Question:   Level of Care     Answer:   Level 2 Stepdown / HOT [14]     Order Specific Question:   Estimated length of stay     Answer:   More than 2 Midnights     Order Specific Question:   Certification     Answer:   I certify that inpatient services are medically necessary for this patient for a duration of greater than two midnights  See H&P and MD Progress Notes for additional information about the patient's course of treatment  Initial Presentation: 34 y o  female who presented to medical detox  Inpatient admission for evaluation and treatment of acute opiate withdrawal  Presented w/ request for detox from opioids  Reports injecting Suboxone after recent heroin use, started feeling very sick; suggestive of precipitated withdrawal  Uses IV heroin daily, last use unclear  Was given last dose of morphine in ED at 2100 on 5/2  On exam, restlessness, agitation  Plan: continue precedex gtt, monitoring w/ symptomatic supportive care, IVF, micro induction of Suboxone when clinically indicated, telemetry, continuous pulse ox, Trend labs, replete electrolytes as needed  Pt was continued on Precedex gtt until the afternoon of 5/3 when she reported improvement in symptoms  Declined any medications including suboxone and requested discharge due to feeling better   Patient was monitored off of Precedex for 4 hours without recurrence of symptoms and cleared for discharge to home  Risks of leaving were discussed with patient and she voiced understanding  Wt Readings from Last 1 Encounters:   05/03/23 101 kg (222 lb 10 6 oz)     Vital Signs:   Date/Time Temp Pulse Resp BP MAP (mmHg) SpO2 O2 Device   05/03/23 1915 98 5 °F (36 9 °C) 61 16 134/81 -- 100 % None (Room air)   05/03/23 1113 97 3 °F (36 3 °C) Abnormal  48 Abnormal  16 124/85 98 95 % None (Room air)   05/03/23 0703 97 4 °F (36 3 °C) Abnormal  48 Abnormal  16 124/82 96 96 % None (Room air)   05/03/23 0300 -- 57 16 127/87 -- 97 % None (Room air)   05/03/23 0139 97 4 °F (36 3 °C) Abnormal  103 18 155/88 110 94 % None (Room air)       Pertinent Labs/Diagnostic Test Results:   Results from last 7 days   Lab Units 05/03/23 0239 05/02/23 2034   WBC Thousand/uL 10 50* 6 38   HEMOGLOBIN g/dL 13 3 14 4   HEMATOCRIT % 38 4 42 2   PLATELETS Thousands/uL 266 263   NEUTROS ABS Thousands/µL  --  4 90         Results from last 7 days   Lab Units 05/03/23 0239 05/02/23 2034   SODIUM mmol/L 137 138   POTASSIUM mmol/L 4 7 3 9   CHLORIDE mmol/L 106 102   CO2 mmol/L 24 23   ANION GAP mmol/L 7 13   BUN mg/dL 14 12   CREATININE mg/dL 1 14 1 13   EGFR ml/min/1 73sq m 60 60   CALCIUM mg/dL 9 3 9 6   MAGNESIUM mg/dL 2 1  --      Results from last 7 days   Lab Units 05/03/23 0239   AST U/L 33   ALT U/L 35   ALK PHOS U/L 86   TOTAL PROTEIN g/dL 6 6   ALBUMIN g/dL 3 9   TOTAL BILIRUBIN mg/dL 0 88         Results from last 7 days   Lab Units 05/03/23 0239 05/02/23 2034   GLUCOSE RANDOM mg/dL 162* 102     Results from last 7 days   Lab Units 05/03/23 0239 05/02/23 2034   CK TOTAL U/L 712* 223*     Results from last 7 days   Lab Units 05/02/23 2034   HEP B S AG  Non-reactive   HEP C AB  Non-reactive     Results from last 7 days   Lab Units 05/02/23 2034   ETHANOL LVL mg/dL <10         Past Medical History:   Diagnosis Date    Hormone imbalance     Pt states she has had since a teenager      Wears contact lenses      Present on Admission:   (Resolved) Opioid withdrawal (HCC)   Opioid use disorder, severe, dependence ([facility] )   Tobacco use   Type 2 diabetes mellitus (HCC)   HTN (hypertension)   Leukocytosis   Elevated CK      Admitting Diagnosis: [facility] ([facility] )  Age/Sex: 34 y o  female  Admission Orders:  Regular Diet  SCDs  COWS monitoring  Telemetry & Continuous Pulse Ox  Scheduled Medications:  enoxaparin, 40 mg, SC, Daily  multi-vitamin, 1 tablet, Oral, Daily  nicotine, 21 mg, TD, Daily    Continuous IV Infusions:  dexmedeTOMIDine, 0 1-1 2 mcg/kg/hr, Intravenous, Titrated; Start: 05/03/23 0145 End: 05/03/23 1600  sodium chloride 0 9 %, 100 mL/hr, Intravenous, Continuous    PRN Meds:  acetaminophen, 650 mg, Oral, Q6H PRN  cloNIDine, 0 1 mg, Oral, Q6H PRN  gabapentin, 300 mg, Oral, Q8H PRN  ondansetron, 4 mg, Intravenous, Q6H PRN  traZODone, 50 mg, Oral, HS PRN        Network Utilization Review Department  ATTENTION: Please call with any questions or concerns to [phone] and carefully listen to the prompts so that you are directed to the right person  All voicemails are confidential   Jessee Milling all requests for admission clinical reviews, approved or denied determinations and any other requests to dedicated fax number below belonging to the campus where the patient is receiving treatment   List of dedicated fax numbers for the Facilities:  [address] DENIALS (Administrative/Medical Necessity) [phone]   [address] (Maternity/NICU/Pediatrics) [phone]   [address] 40 125 [facility]  [address] [facility] [place] [address] [facility] [address] [phone]   Angelina Self [address] [place] [address] [address] [place] [address] [phone]

## 2023-05-05 LAB
EST. AVERAGE GLUCOSE BLD GHB EST-MCNC: 137 MG/DL
HBA1C MFR BLD: 6.4 %

## 2023-05-10 NOTE — UTILIZATION REVIEW
URGENT/EMERGENT  INPATIENT/SPU AUTHORIZATION REQUEST    Date: 05/10/23            # Pages in this Request:     X New Request   Additional Information for PA#:     Office Contact Name:  Erick Irene Title: Utilization Review, Registed Nurse     Phone: [phone]  Ext  Availability (Date/Time): Wednesday - Friday 8 am- 4 pm    x Inpatient Review  SPU Review        Current       x Late Pick-up   · How your facility was first notified of the Late Pick-up: Paths Letter   · When your facility was first notified of the Late Pick-up (date): 5/8/2023         RECIPIENT INFORMATION    Recipient ID#: [id number]   Recipient Name: Ricky Martinez      YOB: 1985  34 y o  Recipient Alias:     Gender:   Male X Female Medicaid Eligibility ([address] [place] [address]): INSURANCE INFORMATION    (All other private or governmental health insurance benefits must be utilized prior to billing the [place] Program)    Was this admission the result of an MVA, other accident, assault, injury, fall, gunshot, bite etc ? Yes X No                   If yes, provide a brief description of the incident  Does the recipient have other insurance coverage? Yes x No        Insurance Company Name/Policy #      Did that insurance pay on this claim? Yes  No        Did that insurance deny this claim? Yes  No    If yes, reason for denial:      Does the recipient have Medicare? Yes x No        Did Medicare exhaust prior to this admission? Yes  No        Did Medicare partially pay this claim? Yes  No        Did that insurance deny this claim? Yes  No    If yes, reason for denial:          Was the recipient a prisoner at the time of admission?   Yes x No            PROVIDER INFORMATION    Hospital Name: [address] [place] [address] [place] Provider ID#: [id number]    [address] [place] [address] [facility]  [facility] Provider ID#: [id number]        ADMISSION INFORMATION    Type of Admission: (please choose one)     ED     X Direct "   If yes, from where? [facility]      Transfer    If yes, transferring hospital (inpatient, rehab, or psych) Provider Name/Provider ID#: Admission Floor or Unit Type:Level 4 medical detox unit     Dates/Times:        ED Date/Time:         Observation Date/Time:         Admission Date/Time: 5/3/23  1:26 AM        Discharge or Transfer Date/Time: 5/3/2023  7:48 PM        DIAGNOSIS/PROCEDURE CODES    Primary Diagnosis Code/Primary Diagnosis Code description:  F11 20 Opioid use disorder, severe, dependence  I10 HTN  E11 9 Type 2  diabetes mellitus  D725 829 Leukocytosis   Additional Diagnosis Code(s) and Description(s)-(up to three additional codes):    Procedure Code (one) and description:        CLINICAL INFORMATION - PRIOR ADMISSION ONLY    Is there a prior admission with a discharge date within 30 days of the date of this admission? X No (Proceed to the next section - \"Clinical Information - General Review Checklist:)      Yes (Provide the following information)     Prior admission dates:    MA Prior Authorization Number:        Review Outcome:     Diagnosis Code(s)/Description:    Procedure Code/Description:    Findings:    Treatment:    Condition on Discharge:   Vitals:    Labs:   Imaging:   Medications: Follow-up Instructions:    Disposition:        CLINICAL INFORMATION - GENERAL REVIEW CHECKLIST    EMERGENCY DEPARTMENT: (Proceed to \"ADMISSION\" if Direct Admission)    Presenting Signs/Symptoms:    Medication/treatment prior to arrival in the ED:    Past Medical History:    Clinical Exam:    Initial Vital Signs: (Temp, Pulse, Resp, and BP)     Pertinent Repeat Vital Signs: (include times they were obtained)    Pertinent Sustained Findings: (include times they were obtained)    Weight in Kilograms:      Pertinent Labs (results):    Radiology (results):    EKG (results):      Other tests (results):    Tests pending final results:    Treatment in the ED:           Other treatments:      Change in condition " "while in the ED:     Response to ED Treatment:          OBSERVATION: (Proceed to \"ADMISSION\" if Direct Admission)    Orders written during the observation period  Meds Name, dose, route, time, how may doses given:  PRN Meds Name, dose, route, time, how many doses given within the first 24 hrs :  IVs Type, rate, and total amt  ordered/given:  Labs, imaging, other:  Consults and findings:    Test Results during the observation period  Pertinent Lab tests (dates/results):  Culture results (blood, urine, spinal, wound, respiratory, etc ):  Imaging tests (dates/results):  EKG (dates/results): Other test (dates/results):  Tests pending (dates/results):    Surgical or Invasive Procedures during the observation period  Name of surgery/procedure:  Date & Time:  Patient Response:  Post-operative orders:  Operative Report/Findings:    Response to Treatment, Major Change in Condition, Major Charge in Treatment during the observation period          ADMISSION:    DIRECT Admissions Only:  34 y o  female who presented to medical detox  Inpatient admission for evaluation and treatment of acute opiate withdrawal  Presented w/ request for detox from opioids  Reports injecting Suboxone after recent heroin use, started feeling very sick; suggestive of precipitated withdrawal  Uses IV heroin daily, last use unclear  Was given last dose of morphine in ED at 2100 on 5/2  On exam, restlessness, agitation  Plan: continue precedex gtt, monitoring w/ symptomatic supportive care, IVF, micro induction of Suboxone when clinically indicated, telemetry, continuous pulse ox, Trend labs, replete electrolytes as needed       Pt was continued on Precedex gtt until the afternoon of 5/3 when she reported improvement in symptoms  Declined any medications including suboxone and requested discharge due to feeling better  Patient was monitored off of Precedex for 4 hours without recurrence of symptoms and cleared for discharge to home   Risks of leaving " were discussed with patient and she voiced understanding  · Presenting Signs/Symptoms:   ·   · Medication/treatment prior to arrival:  ·   · Past Medical History:  ·   · Clinical Exam on admission:  ·   · Vital Signs on admission: (Temp, Pulse, Resp, and BP)  Date/Time Temp Pulse Resp BP MAP (mmHg) SpO2 O2 Device   05/03/23 1915 98 5 °F (36 9 °C) 61 16 134/81 -- 100 % None (Room air)   05/03/23 1113 97 3 °F (36 3 °C) Abnormal  48 Abnormal  16 124/85 98 95 % None (Room air)   05/03/23 0703 97 4 °F (36 3 °C) Abnormal  48 Abnormal  16 124/82 96 96 % None (Room air)   05/03/23 0300 -- 57 16 127/87 -- 97 % None (Room air)   05/03/23 0139 97 4 °F (36 3 °C) Abnormal  103 18 155/88 110 94 % None (Room air)        ·   · Weight in kilograms:   05/03/23 101 kg (222 lb 10 6 oz)           ALL Admissions:    Admission Orders and Other Orders written within the first 24 hrs after admission    Regular Diet  SCDs  COWS monitoring  Telemetry & Continuous Pulse Ox  Meds Name, dose, route, time, how may doses given:    enoxaparin, 40 mg, SC, Daily  multi-vitamin, 1 tablet, Oral, Daily  nicotine, 21 mg, TD, Daily  Nasonex nasal-( Narcan) - 4 mg 5/3 x 1     PRN Meds Name, dose, route, time, how many doses given within the first 24 hrs :    acetaminophen, 650 mg, Oral, Q6H PRN  cloNIDine, 0 1 mg, Oral, Q6H PRN  gabapentin, 300 mg, Oral, Q8H PRN  ondansetron, 4 mg, Intravenous, Q6H PRN  traZODone, 50 mg, Oral, HS PRN         IVs Type, rate, and total amt   Ordered/given:  dexmedeTOMIDine, 0 1-1 2 mcg/kg/hr, Intravenous, Titrated; Start: 05/03/23 0145 End: 05/03/23 1600  sodium chloride 0 9 %, 100 mL/hr, Intravenous, Continuous - End 5/3 @ 21:48      Labs, imaging, other:      Consults and findings:    Test Results after admission  Pertinent Lab tests (dates/results):  Results from last 7 days   Lab Units 05/03/23  0239 05/02/23 2034   WBC Thousand/uL 10 50* 6 38   HEMOGLOBIN g/dL 13 3 14 4   HEMATOCRIT % 38 4 42 2   PLATELETS Thousands/uL 266 263   NEUTROS ABS Thousands/µL  --  4 90                Results from last 7 days   Lab Units 05/03/23 0239 05/02/23 2034   SODIUM mmol/L 137 138   POTASSIUM mmol/L 4 7 3 9   CHLORIDE mmol/L 106 102   CO2 mmol/L 24 23   ANION GAP mmol/L 7 13   BUN mg/dL 14 12   CREATININE mg/dL 1 14 1 13   EGFR ml/min/1 73sq m 60 60   CALCIUM mg/dL 9 3 9 6   MAGNESIUM mg/dL 2 1  --            Results from last 7 days   Lab Units 05/03/23 0239   AST U/L 33   ALT U/L 35   ALK PHOS U/L 86   TOTAL PROTEIN g/dL 6 6   ALBUMIN g/dL 3 9   TOTAL BILIRUBIN mg/dL 0 88                Results from last 7 days   Lab Units 05/03/23 0239 05/02/23 2034   GLUCOSE RANDOM mg/dL 162* 102            Results from last 7 days   Lab Units 05/03/23 0239 05/02/23 2034   CK TOTAL U/L 712* 223*           Results from last 7 days   Lab Units 05/02/23 2034   HEP B S AG   Non-reactive   HEP C AB   Non-reactive           Results from last 7 days   Lab Units 05/02/23 2034   ETHANOL LVL mg/dL <10              Culture results (blood, urine, spinal, wound, respiratory, etc ):  Imaging tests (dates/results):  EKG (dates/results): Other test (dates/results):  Tests pending (dates/results):    Surgical or Invasive Procedures  Name of surgery/procedure:  Date & Time:  Patient Response:  Post-operative orders:  Operative Report/Findings:    Response to Treatment, Major Change in Condition, Major Charge in Treatment anytime during admission    Hospital Course:      Evita Hathaway is a 34 y o  female patient who originally presented to the hospital on 5/3/2023 due to opioid withdrawal  Patient used Suboxone shortly after intravenous heroin use, causing precipitated withdrawal  Patient was started on Precedex infusion in the ED and then transported to [address] [place] [address] detox unit for continued management  Patient was continued on Precedex infusion until the afternoon of 5/3 when she reported improvement in symptoms   Patient declined any medications including suboxone and requested discharge due to feeling better  Patient was monitored off of Precedex for 4 hours without recurrence of symptoms and cleared for discharge to home  Risks of leaving were discussed with patient and she voiced understanding  All questions were answered to patient's satisfaction  Patient was escorted to the front door by nursing staff       The patient, initially admitted to the hospital as inpatient, was discharged earlier than expected given the following: patient's condition greatly improved and patient requesting to be discharged  Disposition on Discharge  Home, Rehab, SNF, LTC, Shelter, etc : Home/Self Care    Cease to Breathe (CTB)  If a patient expires during an admission, in addition to the above information, please include:    Summary/timeline of the patient's decline in condition:    Medications and treatment:    Patient response to treatment:    Date and time patient ceased to breathe:        Is there a Readmission that follows this admission? Yes X No    If yes, provide dates:          InterQual Review    InterQual Criteria Met: X Yes  No  N/A        Please include the InterQual Review, InterQual year/version used, and the criteria selected:   Created Using Review Status Review Jena Currie In Primary 5/4/2023 2587       Created By   Gabriele South RN       Criteria Set Name - Subset   LOC:Acute Adult-Withdrawal Syndrome      Criteria Review   REVIEW SUMMARY     InterQual® Review Status: In Primary  Review Type: Admission  Criteria Status: Critical Met  Day of review: Episode Day 1  Condition Specific: Yes        REVIEW DETAILS     Product: Maxine Qureshi Adult  Subset:  Withdrawal Syndrome            [X] Select Day, One:          [X] Episode Day 1, One:              [X] CRITICAL, >= One:                  [X] Alcohol or anxiolytic or hypnotic or sedative withdrawal syndrome and, >= One:                      [X] Dexmedetomidine, continuous        Version: InterQual® 2023, Mar  2023 Release  InterQual® criteria (IQ) is confidential and proprietary information and is being provided to you solely as it pertains to the information requested  IQ may contain advanced clinical knowledge which we recommend you discuss with your physician upon disclosure to you  Use permitted by and subject to license with [employer] and/or one of its [place]  IQ reflects clinical interpretations and analyses and cannot alone either (a) resolve medical ambiguities of particular situations; or (b) provide the sole basis for definitive decisions  IQ is intended solely for use as screening guidelines with respect to medical appropriateness of healthcare services  All ultimate care decisions are strictly and solely the obligation and responsibility of your health care provider  © 1007 [facility] and/or one of its subsidiaries  All Rights Reserved  CPT® only © 9633-2313 [employer]  All Rights Reserved  PLEASE SUBMIT THE COMPLETED FORM TO THE DEPARTMENT OF HUMAN SERVICES - DIVISION OF CLINICAL  REVIEW VIA FAX -[phone] or VIA E-MAIL TO [email or web address]    Signature: Kymberly Varma Date:  05/10/23    Confidentiality Notice: The documents accompanying this telecopy may contain confidential information belonging to the sender  The information is intended only for the use of the individual named above  If you are not the intended recipient, you are hereby notified  That any disclosure, copying, distribution or taking of any telecopy is strictly prohibited

## 2023-09-15 ENCOUNTER — HOSPITAL ENCOUNTER (EMERGENCY)
Facility: HOSPITAL | Age: 38
End: 2023-09-16
Attending: EMERGENCY MEDICINE | Admitting: EMERGENCY MEDICINE

## 2023-09-15 VITALS
RESPIRATION RATE: 18 BRPM | OXYGEN SATURATION: 97 % | WEIGHT: 222.66 LBS | HEIGHT: 66 IN | TEMPERATURE: 97.3 F | DIASTOLIC BLOOD PRESSURE: 63 MMHG | BODY MASS INDEX: 35.79 KG/M2 | HEART RATE: 98 BPM | SYSTOLIC BLOOD PRESSURE: 132 MMHG

## 2023-09-15 DIAGNOSIS — F11.93 OPIOID WITHDRAWAL (HCC): ICD-10-CM

## 2023-09-15 DIAGNOSIS — F19.10 POLYSUBSTANCE ABUSE (HCC): Primary | ICD-10-CM

## 2023-09-15 DIAGNOSIS — F11.20 OPIOID USE DISORDER, SEVERE, DEPENDENCE (HCC): ICD-10-CM

## 2023-09-15 LAB
ALBUMIN SERPL BCP-MCNC: 4.6 G/DL (ref 3.5–5)
ALP SERPL-CCNC: 116 U/L (ref 34–104)
ALT SERPL W P-5'-P-CCNC: 20 U/L (ref 7–52)
AMORPH URATE CRY URNS QL MICRO: ABNORMAL /HPF
ANION GAP SERPL CALCULATED.3IONS-SCNC: 17 MMOL/L
AST SERPL W P-5'-P-CCNC: 11 U/L (ref 13–39)
BACTERIA UR QL AUTO: ABNORMAL /HPF
BASE EX.OXY STD BLDV CALC-SCNC: 39.5 % (ref 60–80)
BASE EXCESS BLDV CALC-SCNC: 1.1 MMOL/L
BASOPHILS # BLD AUTO: 0.05 THOUSANDS/ÂΜL (ref 0–0.1)
BASOPHILS NFR BLD AUTO: 0 % (ref 0–1)
BETA-HYDROXYBUTYRATE: 2.8 MMOL/L
BILIRUB SERPL-MCNC: 1.33 MG/DL (ref 0.2–1)
BILIRUB UR QL STRIP: NEGATIVE
BUN SERPL-MCNC: 25 MG/DL (ref 5–25)
CALCIUM SERPL-MCNC: 10.9 MG/DL (ref 8.4–10.2)
CHLORIDE SERPL-SCNC: 82 MMOL/L (ref 96–108)
CK SERPL-CCNC: 90 U/L (ref 26–192)
CLARITY UR: CLEAR
CO2 SERPL-SCNC: 25 MMOL/L (ref 21–32)
COLOR UR: YELLOW
CREAT SERPL-MCNC: 1.08 MG/DL (ref 0.6–1.3)
CRYSTALS URNS QL MICRO: ABNORMAL /HPF
EOSINOPHIL # BLD AUTO: 0.02 THOUSAND/ÂΜL (ref 0–0.61)
EOSINOPHIL NFR BLD AUTO: 0 % (ref 0–6)
ERYTHROCYTE [DISTWIDTH] IN BLOOD BY AUTOMATED COUNT: 11.9 % (ref 11.6–15.1)
EXT PREGNANCY TEST URINE: NEGATIVE
EXT. CONTROL: NORMAL
GFR SERPL CREATININE-BSD FRML MDRD: 64 ML/MIN/1.73SQ M
GLUCOSE SERPL-MCNC: 502 MG/DL (ref 65–140)
GLUCOSE UR STRIP-MCNC: ABNORMAL MG/DL
HCO3 BLDV-SCNC: 25.2 MMOL/L (ref 24–30)
HCT VFR BLD AUTO: 51.3 % (ref 34.8–46.1)
HGB BLD-MCNC: 17.6 G/DL (ref 11.5–15.4)
HGB UR QL STRIP.AUTO: NEGATIVE
IMM GRANULOCYTES # BLD AUTO: 0.06 THOUSAND/UL (ref 0–0.2)
IMM GRANULOCYTES NFR BLD AUTO: 0 % (ref 0–2)
KETONES UR STRIP-MCNC: ABNORMAL MG/DL
LEUKOCYTE ESTERASE UR QL STRIP: ABNORMAL
LYMPHOCYTES # BLD AUTO: 1.47 THOUSANDS/ÂΜL (ref 0.6–4.47)
LYMPHOCYTES NFR BLD AUTO: 11 % (ref 14–44)
MCH RBC QN AUTO: 28.3 PG (ref 26.8–34.3)
MCHC RBC AUTO-ENTMCNC: 34.3 G/DL (ref 31.4–37.4)
MCV RBC AUTO: 83 FL (ref 82–98)
MONOCYTES # BLD AUTO: 0.61 THOUSAND/ÂΜL (ref 0.17–1.22)
MONOCYTES NFR BLD AUTO: 4 % (ref 4–12)
NEUTROPHILS # BLD AUTO: 11.78 THOUSANDS/ÂΜL (ref 1.85–7.62)
NEUTS SEG NFR BLD AUTO: 85 % (ref 43–75)
NITRITE UR QL STRIP: NEGATIVE
NON-SQ EPI CELLS URNS QL MICRO: ABNORMAL /HPF
NRBC BLD AUTO-RTO: 0 /100 WBCS
O2 CT BLDV-SCNC: 10.3 ML/DL
OTHER STN SPEC: ABNORMAL
PCO2 BLDV: 38.9 MM HG (ref 42–50)
PH BLDV: 7.43 [PH] (ref 7.3–7.4)
PH UR STRIP.AUTO: 5 [PH]
PLATELET # BLD AUTO: 392 THOUSANDS/UL (ref 149–390)
PMV BLD AUTO: 10.4 FL (ref 8.9–12.7)
PO2 BLDV: 23.5 MM HG (ref 35–45)
POTASSIUM SERPL-SCNC: 4.7 MMOL/L (ref 3.5–5.3)
PROT SERPL-MCNC: 8.5 G/DL (ref 6.4–8.4)
PROT UR STRIP-MCNC: ABNORMAL MG/DL
RBC # BLD AUTO: 6.21 MILLION/UL (ref 3.81–5.12)
RBC #/AREA URNS AUTO: ABNORMAL /HPF
SODIUM SERPL-SCNC: 124 MMOL/L (ref 135–147)
SP GR UR STRIP.AUTO: 1.02 (ref 1–1.03)
UROBILINOGEN UR QL STRIP.AUTO: 0.2 E.U./DL
WBC # BLD AUTO: 13.99 THOUSAND/UL (ref 4.31–10.16)
WBC #/AREA URNS AUTO: ABNORMAL /HPF

## 2023-09-15 PROCEDURE — 81001 URINALYSIS AUTO W/SCOPE: CPT | Performed by: EMERGENCY MEDICINE

## 2023-09-15 PROCEDURE — 80053 COMPREHEN METABOLIC PANEL: CPT | Performed by: EMERGENCY MEDICINE

## 2023-09-15 PROCEDURE — 82805 BLOOD GASES W/O2 SATURATION: CPT | Performed by: EMERGENCY MEDICINE

## 2023-09-15 PROCEDURE — 82550 ASSAY OF CK (CPK): CPT | Performed by: EMERGENCY MEDICINE

## 2023-09-15 PROCEDURE — 36415 COLL VENOUS BLD VENIPUNCTURE: CPT | Performed by: EMERGENCY MEDICINE

## 2023-09-15 PROCEDURE — 99285 EMERGENCY DEPT VISIT HI MDM: CPT | Performed by: EMERGENCY MEDICINE

## 2023-09-15 PROCEDURE — 96376 TX/PRO/DX INJ SAME DRUG ADON: CPT

## 2023-09-15 PROCEDURE — 81025 URINE PREGNANCY TEST: CPT | Performed by: EMERGENCY MEDICINE

## 2023-09-15 PROCEDURE — 96375 TX/PRO/DX INJ NEW DRUG ADDON: CPT

## 2023-09-15 PROCEDURE — 96361 HYDRATE IV INFUSION ADD-ON: CPT

## 2023-09-15 PROCEDURE — 96374 THER/PROPH/DIAG INJ IV PUSH: CPT

## 2023-09-15 PROCEDURE — 82010 KETONE BODYS QUAN: CPT | Performed by: EMERGENCY MEDICINE

## 2023-09-15 PROCEDURE — 99284 EMERGENCY DEPT VISIT MOD MDM: CPT

## 2023-09-15 PROCEDURE — 85025 COMPLETE CBC W/AUTO DIFF WBC: CPT | Performed by: EMERGENCY MEDICINE

## 2023-09-15 RX ORDER — LORAZEPAM 2 MG/ML
1 INJECTION INTRAMUSCULAR ONCE
Status: COMPLETED | OUTPATIENT
Start: 2023-09-15 | End: 2023-09-15

## 2023-09-15 RX ORDER — ONDANSETRON 2 MG/ML
4 INJECTION INTRAMUSCULAR; INTRAVENOUS ONCE
Status: COMPLETED | OUTPATIENT
Start: 2023-09-15 | End: 2023-09-15

## 2023-09-15 RX ORDER — MIDAZOLAM HYDROCHLORIDE 2 MG/2ML
1 INJECTION, SOLUTION INTRAMUSCULAR; INTRAVENOUS ONCE
Status: COMPLETED | OUTPATIENT
Start: 2023-09-15 | End: 2023-09-15

## 2023-09-15 RX ADMIN — SODIUM CHLORIDE 1000 ML: 0.9 INJECTION, SOLUTION INTRAVENOUS at 20:55

## 2023-09-15 RX ADMIN — ONDANSETRON 4 MG: 2 INJECTION INTRAMUSCULAR; INTRAVENOUS at 23:06

## 2023-09-15 RX ADMIN — LORAZEPAM 1 MG: 2 INJECTION INTRAMUSCULAR; INTRAVENOUS at 23:06

## 2023-09-15 RX ADMIN — MIDAZOLAM 1 MG: 1 INJECTION INTRAMUSCULAR; INTRAVENOUS at 20:55

## 2023-09-15 RX ADMIN — ONDANSETRON 4 MG: 2 INJECTION INTRAMUSCULAR; INTRAVENOUS at 20:55

## 2023-09-16 ENCOUNTER — APPOINTMENT (OUTPATIENT)
Dept: RADIOLOGY | Facility: HOSPITAL | Age: 38
End: 2023-09-16
Payer: COMMERCIAL

## 2023-09-16 ENCOUNTER — HOSPITAL ENCOUNTER (INPATIENT)
Facility: HOSPITAL | Age: 38
LOS: 2 days | Discharge: HOME | End: 2023-09-18
Attending: EMERGENCY MEDICINE | Admitting: EMERGENCY MEDICINE
Payer: COMMERCIAL

## 2023-09-16 DIAGNOSIS — E86.0 DEHYDRATION: Primary | ICD-10-CM

## 2023-09-16 DIAGNOSIS — E11.65 TYPE 2 DIABETES MELLITUS WITH HYPERGLYCEMIA, WITH LONG-TERM CURRENT USE OF INSULIN (HCC): ICD-10-CM

## 2023-09-16 DIAGNOSIS — N30.00 ACUTE CYSTITIS: ICD-10-CM

## 2023-09-16 DIAGNOSIS — F11.20 OPIOID USE DISORDER, SEVERE, DEPENDENCE (HCC): ICD-10-CM

## 2023-09-16 DIAGNOSIS — Z79.4 TYPE 2 DIABETES MELLITUS WITH HYPERGLYCEMIA, WITH LONG-TERM CURRENT USE OF INSULIN (HCC): ICD-10-CM

## 2023-09-16 PROBLEM — E87.1 HYPONATREMIA: Status: ACTIVE | Noted: 2023-09-16

## 2023-09-16 PROBLEM — R79.89 ABNORMAL LFTS (LIVER FUNCTION TESTS): Status: ACTIVE | Noted: 2023-09-16

## 2023-09-16 PROBLEM — F19.10 POLYSUBSTANCE ABUSE (HCC): Status: ACTIVE | Noted: 2023-09-16

## 2023-09-16 PROBLEM — E66.9 CLASS 2 OBESITY WITH BODY MASS INDEX (BMI) OF 35.0 TO 35.9 IN ADULT: Status: ACTIVE | Noted: 2023-09-16

## 2023-09-16 PROBLEM — R73.9 HYPERGLYCEMIA: Status: ACTIVE | Noted: 2023-09-16

## 2023-09-16 PROBLEM — E66.812 CLASS 2 OBESITY WITH BODY MASS INDEX (BMI) OF 35.0 TO 35.9 IN ADULT: Status: ACTIVE | Noted: 2023-09-16

## 2023-09-16 LAB
ALBUMIN SERPL BCP-MCNC: 4.2 G/DL (ref 3.5–5)
ALP SERPL-CCNC: 99 U/L (ref 34–104)
ALT SERPL W P-5'-P-CCNC: 17 U/L (ref 7–52)
ANION GAP SERPL CALCULATED.3IONS-SCNC: 14 MMOL/L
AST SERPL W P-5'-P-CCNC: 10 U/L (ref 13–39)
ATRIAL RATE: 90 BPM
BILIRUB SERPL-MCNC: 1.28 MG/DL (ref 0.2–1)
BUN SERPL-MCNC: 20 MG/DL (ref 5–25)
CALCIUM SERPL-MCNC: 9.2 MG/DL (ref 8.4–10.2)
CHLORIDE SERPL-SCNC: 86 MMOL/L (ref 96–108)
CHOLEST SERPL-MCNC: 185 MG/DL
CO2 SERPL-SCNC: 27 MMOL/L (ref 21–32)
CREAT SERPL-MCNC: 0.96 MG/DL (ref 0.6–1.3)
ERYTHROCYTE [DISTWIDTH] IN BLOOD BY AUTOMATED COUNT: 11.8 % (ref 11.6–15.1)
GFR SERPL CREATININE-BSD FRML MDRD: 74 ML/MIN/1.73SQ M
GLUCOSE SERPL-MCNC: 358 MG/DL (ref 65–140)
GLUCOSE SERPL-MCNC: 361 MG/DL (ref 65–140)
GLUCOSE SERPL-MCNC: 374 MG/DL (ref 65–140)
GLUCOSE SERPL-MCNC: 414 MG/DL (ref 65–140)
GLUCOSE SERPL-MCNC: 444 MG/DL (ref 65–140)
HCT VFR BLD AUTO: 47.8 % (ref 34.8–46.1)
HDLC SERPL-MCNC: 34 MG/DL
HGB BLD-MCNC: 16.6 G/DL (ref 11.5–15.4)
LDLC SERPL CALC-MCNC: 116 MG/DL (ref 0–100)
MCH RBC QN AUTO: 28.3 PG (ref 26.8–34.3)
MCHC RBC AUTO-ENTMCNC: 34.7 G/DL (ref 31.4–37.4)
MCV RBC AUTO: 82 FL (ref 82–98)
P AXIS: 47 DEGREES
PLATELET # BLD AUTO: 347 THOUSANDS/UL (ref 149–390)
PMV BLD AUTO: 9.7 FL (ref 8.9–12.7)
POTASSIUM SERPL-SCNC: 3.7 MMOL/L (ref 3.5–5.3)
PR INTERVAL: 132 MS
PROT SERPL-MCNC: 7.7 G/DL (ref 6.4–8.4)
QRS AXIS: 8 DEGREES
QRSD INTERVAL: 74 MS
QT INTERVAL: 366 MS
QTC INTERVAL: 447 MS
RBC # BLD AUTO: 5.86 MILLION/UL (ref 3.81–5.12)
SODIUM SERPL-SCNC: 127 MMOL/L (ref 135–147)
T WAVE AXIS: 43 DEGREES
TRIGL SERPL-MCNC: 173 MG/DL
VENTRICULAR RATE: 90 BPM
WBC # BLD AUTO: 13.12 THOUSAND/UL (ref 4.31–10.16)

## 2023-09-16 PROCEDURE — 93005 ELECTROCARDIOGRAM TRACING: CPT

## 2023-09-16 PROCEDURE — NC001 PR NO CHARGE: Performed by: EMERGENCY MEDICINE

## 2023-09-16 PROCEDURE — 85027 COMPLETE CBC AUTOMATED: CPT | Performed by: EMERGENCY MEDICINE

## 2023-09-16 PROCEDURE — 82948 REAGENT STRIP/BLOOD GLUCOSE: CPT

## 2023-09-16 PROCEDURE — 05HM33Z INSERTION OF INFUSION DEVICE INTO RIGHT INTERNAL JUGULAR VEIN, PERCUTANEOUS APPROACH: ICD-10-PCS | Performed by: EMERGENCY MEDICINE

## 2023-09-16 PROCEDURE — 99291 CRITICAL CARE FIRST HOUR: CPT | Performed by: EMERGENCY MEDICINE

## 2023-09-16 PROCEDURE — 36556 INSERT NON-TUNNEL CV CATH: CPT | Performed by: EMERGENCY MEDICINE

## 2023-09-16 PROCEDURE — 71045 X-RAY EXAM CHEST 1 VIEW: CPT

## 2023-09-16 PROCEDURE — 80053 COMPREHEN METABOLIC PANEL: CPT | Performed by: EMERGENCY MEDICINE

## 2023-09-16 PROCEDURE — 80061 LIPID PANEL: CPT | Performed by: EMERGENCY MEDICINE

## 2023-09-16 PROCEDURE — NC001 PR NO CHARGE: Performed by: PHYSICIAN ASSISTANT

## 2023-09-16 PROCEDURE — 93010 ELECTROCARDIOGRAM REPORT: CPT | Performed by: INTERNAL MEDICINE

## 2023-09-16 PROCEDURE — 96376 TX/PRO/DX INJ SAME DRUG ADON: CPT

## 2023-09-16 RX ORDER — ENOXAPARIN SODIUM 100 MG/ML
40 INJECTION SUBCUTANEOUS DAILY
Status: DISCONTINUED | OUTPATIENT
Start: 2023-09-16 | End: 2023-09-18 | Stop reason: HOSPADM

## 2023-09-16 RX ORDER — ACETAMINOPHEN 325 MG/1
650 TABLET ORAL EVERY 6 HOURS PRN
Status: CANCELLED | OUTPATIENT
Start: 2023-09-16

## 2023-09-16 RX ORDER — ONDANSETRON 2 MG/ML
4 INJECTION INTRAMUSCULAR; INTRAVENOUS EVERY 6 HOURS PRN
Status: CANCELLED | OUTPATIENT
Start: 2023-09-16

## 2023-09-16 RX ORDER — CLONIDINE HYDROCHLORIDE 0.1 MG/1
0.1 TABLET ORAL EVERY 6 HOURS PRN
Status: CANCELLED | OUTPATIENT
Start: 2023-09-16

## 2023-09-16 RX ORDER — INSULIN LISPRO 100 [IU]/ML
1-5 INJECTION, SOLUTION INTRAVENOUS; SUBCUTANEOUS
Status: DISCONTINUED | OUTPATIENT
Start: 2023-09-16 | End: 2023-09-17

## 2023-09-16 RX ORDER — ENOXAPARIN SODIUM 100 MG/ML
40 INJECTION SUBCUTANEOUS DAILY
Status: CANCELLED | OUTPATIENT
Start: 2023-09-16

## 2023-09-16 RX ORDER — DIPHENHYDRAMINE HYDROCHLORIDE AND LIDOCAINE HYDROCHLORIDE AND ALUMINUM HYDROXIDE AND MAGNESIUM HYDRO
10 KIT EVERY 4 HOURS PRN
Status: DISCONTINUED | OUTPATIENT
Start: 2023-09-16 | End: 2023-09-18 | Stop reason: HOSPADM

## 2023-09-16 RX ORDER — BUPRENORPHINE AND NALOXONE 8; 2 MG/1; MG/1
8 FILM, SOLUBLE BUCCAL; SUBLINGUAL DAILY
Status: DISCONTINUED | OUTPATIENT
Start: 2023-09-16 | End: 2023-09-16

## 2023-09-16 RX ORDER — NICOTINE 21 MG/24HR
1 PATCH, TRANSDERMAL 24 HOURS TRANSDERMAL DAILY
Status: DISCONTINUED | OUTPATIENT
Start: 2023-09-16 | End: 2023-09-18 | Stop reason: HOSPADM

## 2023-09-16 RX ORDER — CLONIDINE HYDROCHLORIDE 0.1 MG/1
0.1 TABLET ORAL EVERY 6 HOURS PRN
Status: DISCONTINUED | OUTPATIENT
Start: 2023-09-16 | End: 2023-09-18 | Stop reason: HOSPADM

## 2023-09-16 RX ORDER — ONDANSETRON 2 MG/ML
4 INJECTION INTRAMUSCULAR; INTRAVENOUS ONCE
Status: COMPLETED | OUTPATIENT
Start: 2023-09-16 | End: 2023-09-16

## 2023-09-16 RX ORDER — SODIUM CHLORIDE 9 MG/ML
100 INJECTION, SOLUTION INTRAVENOUS CONTINUOUS
Status: CANCELLED | OUTPATIENT
Start: 2023-09-16

## 2023-09-16 RX ORDER — METOCLOPRAMIDE HYDROCHLORIDE 5 MG/ML
10 INJECTION INTRAMUSCULAR; INTRAVENOUS EVERY 6 HOURS PRN
Status: DISCONTINUED | OUTPATIENT
Start: 2023-09-16 | End: 2023-09-16

## 2023-09-16 RX ORDER — CEFTRIAXONE 1 G/50ML
1000 INJECTION, SOLUTION INTRAVENOUS EVERY 24 HOURS
Status: DISCONTINUED | OUTPATIENT
Start: 2023-09-16 | End: 2023-09-18

## 2023-09-16 RX ORDER — SODIUM CHLORIDE 9 MG/ML
100 INJECTION, SOLUTION INTRAVENOUS CONTINUOUS
Status: DISCONTINUED | OUTPATIENT
Start: 2023-09-16 | End: 2023-09-18

## 2023-09-16 RX ORDER — MAGNESIUM HYDROXIDE/ALUMINUM HYDROXICE/SIMETHICONE 120; 1200; 1200 MG/30ML; MG/30ML; MG/30ML
30 SUSPENSION ORAL EVERY 6 HOURS PRN
Status: CANCELLED | OUTPATIENT
Start: 2023-09-16

## 2023-09-16 RX ORDER — BUPRENORPHINE AND NALOXONE 8; 2 MG/1; MG/1
8 FILM, SOLUBLE BUCCAL; SUBLINGUAL 2 TIMES DAILY
Status: DISCONTINUED | OUTPATIENT
Start: 2023-09-16 | End: 2023-09-18 | Stop reason: HOSPADM

## 2023-09-16 RX ORDER — HALOPERIDOL 5 MG/ML
5 INJECTION INTRAMUSCULAR ONCE
Status: DISCONTINUED | OUTPATIENT
Start: 2023-09-16 | End: 2023-09-16

## 2023-09-16 RX ORDER — LOPERAMIDE HYDROCHLORIDE 2 MG/1
2 CAPSULE ORAL EVERY 4 HOURS PRN
Status: DISCONTINUED | OUTPATIENT
Start: 2023-09-16 | End: 2023-09-18 | Stop reason: HOSPADM

## 2023-09-16 RX ORDER — ONDANSETRON 2 MG/ML
4 INJECTION INTRAMUSCULAR; INTRAVENOUS EVERY 6 HOURS PRN
Status: DISCONTINUED | OUTPATIENT
Start: 2023-09-16 | End: 2023-09-16

## 2023-09-16 RX ORDER — LIDOCAINE HYDROCHLORIDE 10 MG/ML
INJECTION, SOLUTION EPIDURAL; INFILTRATION; INTRACAUDAL; PERINEURAL
Status: COMPLETED
Start: 2023-09-16 | End: 2023-09-16

## 2023-09-16 RX ORDER — HALOPERIDOL 5 MG/ML
5 INJECTION INTRAMUSCULAR ONCE
Status: COMPLETED | OUTPATIENT
Start: 2023-09-16 | End: 2023-09-16

## 2023-09-16 RX ORDER — BUPRENORPHINE 2 MG/1
1 TABLET SUBLINGUAL ONCE
Status: COMPLETED | OUTPATIENT
Start: 2023-09-16 | End: 2023-09-16

## 2023-09-16 RX ORDER — INSULIN LISPRO 100 [IU]/ML
1-6 INJECTION, SOLUTION INTRAVENOUS; SUBCUTANEOUS
Status: DISCONTINUED | OUTPATIENT
Start: 2023-09-16 | End: 2023-09-17

## 2023-09-16 RX ORDER — LOPERAMIDE HYDROCHLORIDE 2 MG/1
2 CAPSULE ORAL EVERY 4 HOURS PRN
Status: CANCELLED | OUTPATIENT
Start: 2023-09-16

## 2023-09-16 RX ORDER — NICOTINE 21 MG/24HR
1 PATCH, TRANSDERMAL 24 HOURS TRANSDERMAL DAILY
Status: CANCELLED | OUTPATIENT
Start: 2023-09-16

## 2023-09-16 RX ORDER — CLONAZEPAM 0.5 MG/1
2 TABLET ORAL EVERY 6 HOURS PRN
Status: CANCELLED | OUTPATIENT
Start: 2023-09-16

## 2023-09-16 RX ORDER — ACETAMINOPHEN 325 MG/1
650 TABLET ORAL EVERY 6 HOURS PRN
Status: DISCONTINUED | OUTPATIENT
Start: 2023-09-16 | End: 2023-09-18 | Stop reason: HOSPADM

## 2023-09-16 RX ORDER — MAGNESIUM HYDROXIDE/ALUMINUM HYDROXICE/SIMETHICONE 120; 1200; 1200 MG/30ML; MG/30ML; MG/30ML
30 SUSPENSION ORAL EVERY 6 HOURS PRN
Status: DISCONTINUED | OUTPATIENT
Start: 2023-09-16 | End: 2023-09-18 | Stop reason: HOSPADM

## 2023-09-16 RX ORDER — LORAZEPAM 2 MG/ML
2 INJECTION INTRAMUSCULAR ONCE
Status: COMPLETED | OUTPATIENT
Start: 2023-09-16 | End: 2023-09-16

## 2023-09-16 RX ORDER — GABAPENTIN 300 MG/1
300 CAPSULE ORAL EVERY 8 HOURS PRN
Status: CANCELLED | OUTPATIENT
Start: 2023-09-16

## 2023-09-16 RX ORDER — CLONAZEPAM 1 MG/1
2 TABLET ORAL EVERY 6 HOURS PRN
Status: DISCONTINUED | OUTPATIENT
Start: 2023-09-16 | End: 2023-09-17

## 2023-09-16 RX ORDER — ONDANSETRON 4 MG/1
4 TABLET, ORALLY DISINTEGRATING ORAL EVERY 6 HOURS PRN
Status: DISCONTINUED | OUTPATIENT
Start: 2023-09-16 | End: 2023-09-18 | Stop reason: HOSPADM

## 2023-09-16 RX ORDER — DIAZEPAM 5 MG/ML
5 INJECTION, SOLUTION INTRAMUSCULAR; INTRAVENOUS EVERY 6 HOURS PRN
Status: DISCONTINUED | OUTPATIENT
Start: 2023-09-16 | End: 2023-09-17

## 2023-09-16 RX ORDER — GABAPENTIN 300 MG/1
300 CAPSULE ORAL EVERY 8 HOURS PRN
Status: DISCONTINUED | OUTPATIENT
Start: 2023-09-16 | End: 2023-09-18 | Stop reason: HOSPADM

## 2023-09-16 RX ADMIN — ACETAMINOPHEN 650 MG: 325 TABLET ORAL at 06:22

## 2023-09-16 RX ADMIN — INSULIN LISPRO 6 UNITS: 100 INJECTION, SOLUTION INTRAVENOUS; SUBCUTANEOUS at 16:10

## 2023-09-16 RX ADMIN — CLONAZEPAM 2 MG: 1 TABLET ORAL at 06:23

## 2023-09-16 RX ADMIN — Medication 1 MG: at 14:12

## 2023-09-16 RX ADMIN — HALOPERIDOL LACTATE 5 MG: 5 INJECTION, SOLUTION INTRAMUSCULAR at 12:24

## 2023-09-16 RX ADMIN — INSULIN LISPRO 6 UNITS: 100 INJECTION, SOLUTION INTRAVENOUS; SUBCUTANEOUS at 08:05

## 2023-09-16 RX ADMIN — NICOTINE 1 PATCH: 14 PATCH, EXTENDED RELEASE TRANSDERMAL at 08:05

## 2023-09-16 RX ADMIN — LIDOCAINE HYDROCHLORIDE 50 MG: 10 INJECTION, SOLUTION EPIDURAL; INFILTRATION; INTRACAUDAL; PERINEURAL at 15:22

## 2023-09-16 RX ADMIN — BUPRENORPHINE AND NALOXONE 8 MG: 8; 2 FILM BUCCAL; SUBLINGUAL at 21:26

## 2023-09-16 RX ADMIN — ACETAMINOPHEN 650 MG: 325 TABLET ORAL at 21:12

## 2023-09-16 RX ADMIN — INSULIN LISPRO 6 UNITS: 100 INJECTION, SOLUTION INTRAVENOUS; SUBCUTANEOUS at 11:42

## 2023-09-16 RX ADMIN — BUPRENORPHINE AND NALOXONE 8 MG: 8; 2 FILM BUCCAL; SUBLINGUAL at 15:56

## 2023-09-16 RX ADMIN — ONDANSETRON 4 MG: 4 TABLET, ORALLY DISINTEGRATING ORAL at 21:18

## 2023-09-16 RX ADMIN — LORAZEPAM 2 MG: 2 INJECTION INTRAMUSCULAR; INTRAVENOUS at 15:21

## 2023-09-16 RX ADMIN — ONDANSETRON 4 MG: 4 TABLET, ORALLY DISINTEGRATING ORAL at 08:53

## 2023-09-16 RX ADMIN — INSULIN LISPRO 4 UNITS: 100 INJECTION, SOLUTION INTRAVENOUS; SUBCUTANEOUS at 21:36

## 2023-09-16 RX ADMIN — ONDANSETRON HYDROCHLORIDE 4 MG: 2 INJECTION, SOLUTION INTRAVENOUS at 02:28

## 2023-09-16 RX ADMIN — SODIUM CHLORIDE 100 ML/HR: 0.9 INJECTION, SOLUTION INTRAVENOUS at 05:30

## 2023-09-16 RX ADMIN — CLONAZEPAM 2 MG: 1 TABLET ORAL at 21:15

## 2023-09-16 RX ADMIN — METOCLOPRAMIDE 10 MG: 5 INJECTION, SOLUTION INTRAMUSCULAR; INTRAVENOUS at 05:38

## 2023-09-16 NOTE — ASSESSMENT & PLAN NOTE
Lab Results   Component Value Date    HGBA1C 6.4 (H) 05/03/2023       No results for input(s): "POCGLU" in the last 72 hours.     Blood Sugar Average: Last 72 hrs:     · Patient with a history of DM2 +significant hyperglycemia on admission CMP with glucose 502  · Additional pertinent labs include:  · CBC: evidence of dehydration with hemoconcentration  · CMP: Na 124 (corrected Na 130), low Cl 82, CO2 25, anion gap 17  · Elevated BHB 2.8  · VBG: pH 7.430, pCO2 38.9, pO2 23.5, HCO3 25.2 - not indicative of acidosis/DKA  · No current home medications  · Initiate SSI coverage  · Accuchecks AC/HS  · Current diet of clear liquids due to persistent vomiting, will advance to a carb-controlled diet when able  · Continue to monitor AM labs  · Hypoglycemia protocol

## 2023-09-16 NOTE — ASSESSMENT & PLAN NOTE
· Patient with a history of HTN  · No current home medications  · Per chart review, patient was previously on lisinopril 20 mg daily around 2016  · BP mildly hypertensive upon arrival to the unit in the setting of acute opioid withdrawal and chronic HTN  · Most Recent Blood Pressure: 144/84    · Will hold anti-HTN medications while pt undergoes treatment for acute opioid withdrawal   · Plan to restart anti-hypertensive therapy if BP remains high after resolution of acute withdrawal  · Continue monitoring BP

## 2023-09-16 NOTE — H&P
[facility]  H&P  Name: Lauren Harp y.o. female I MRN: [id number]  Unit/Bed#: 5T DETOX 427-07 I Date of Admission: 9/16/2023   Date of Service: 9/16/2023 I Hospital Day: 0  HISTORY & PHYSICAL EXAM  DEPARTMENT OF MEDICAL TOXICOLOGY  LEVEL 4 MEDICAL DETOX UNIT  Lauren Cruz 34 y.o. female MRN: [id number]  Unit/Bed#: 5T DETOX 513-01 Encounter: [id number]      Reason for Admission/Principal Problem: Opioid withdrawal, opioid use disorder  Admitting Provider: Yina Still PA-C  Attending Provider: Rennie Spatz, MD   9/16/2023  5:05 AM      * Opioid withdrawal ([address])  Assessment & Plan  · Patient with a history of chronic opioid use  · Last use was about 3 days ago (9/13)  · Initiate MAT/opioid withdrawal protocol with plan for eventual microinduction with Suboxone  · Presented with opioid withdrawal sx of primarily nausea/vomiting and anxiety - which improved with Klonopin and Reglan  · Currently resting comfortably in NAD, denies significant withdrawal sx at present  · As >48 hours have passed since pt's last use, plan to initiate buprenorphine microinduction with test dose 0.5 mg Subutex and advance as tolerated   · Symptomatic and supportive care  · Continuous pulse oximetry monitoring    Opioid use disorder, severe, dependence ([address])  Assessment & Plan  · Patient with a history of chronic IV heroin/fentanyl use  · Uses about 1 bundle daily   · History of prior [address] [place] [address] Detox admission in May 2023 - pt put herself into precipitated w/d with Suboxone PTA, was started on Precedex gtt in the ED which was continued on admission, pt felt improved and declined Suboxone, and was discharged to home after being monitored for 4 hours off the Precedex gtt   · Management of opioid withdrawal under MAT protocol as above  · Screen for hepatitis/HIV with h/o IVDU  · Case management consulted for assistance with aftercare resources    Type 2 diabetes mellitus with hyperglycemia ([address])  Assessment & Plan  Lab Results   Component Value Date    HGBA1C 6.4 (H) 05/03/2023       No results for input(s): "POCGLU" in the last 72 hours.     Blood Sugar Average: Last 72 hrs:     · Patient with a history of DM2 +significant hyperglycemia on admission CMP with glucose 502  · Additional pertinent labs include:  · CBC: evidence of dehydration with hemoconcentration  · CMP: Na 124 (corrected Na 130), low Cl 82, CO2 25, anion gap 17  · Elevated BHB 2.8  · VBG: pH 7.430, pCO2 38.9, pO2 23.5, HCO3 25.2 - not indicative of acidosis/DKA  · No current home medications  · Initiate SSI coverage  · Accuchecks AC/HS  · Current diet of clear liquids due to persistent vomiting, will advance to a carb-controlled diet when able  · Continue to monitor AM labs  · Hypoglycemia protocol    HTN (hypertension)  Assessment & Plan  · Patient with a history of HTN  · No current home medications  · Per chart review, patient was previously on lisinopril 20 mg daily around 2016  · BP mildly hypertensive upon arrival to the unit in the setting of acute opioid withdrawal and chronic HTN  · Most Recent Blood Pressure: 144/84    · Will hold anti-HTN medications while pt undergoes treatment for acute opioid withdrawal   · Plan to restart anti-hypertensive therapy if BP remains high after resolution of acute withdrawal  · Continue monitoring BP    Polysubstance abuse ([address])  Assessment & Plan  Pt with a h/o polysubstance abuse with illicit opioids and methamphetamine  Last used methamphetamine about 2-3 days ago (9/13-9/14)  UDS pending  Withdrawal management as above  Encourage cessation of all substance use     Dehydration  Assessment & Plan  · E/b hemoconcentration on CBC with Hgb 17.6, Hct 51.3, WBC 13.99,   · IVF hydration  · Continue monitoring CBC      Leukocytosis  Assessment & Plan  Lab Results   Component Value Date    WBC 13.99 (H) 09/15/2023        • Elevated WBC on admission  • Possibly 2/2 leukemoid reaction from acute withdrawal and/or hemoconcentration from dehydration  • No evidence of active infection   o Pt afebrile, VSS  • Continue monitoring CBC, VS       Hyponatremia  Assessment & Plan  · Na 124, corrected Na 130 to account for hyperglycemia on admission  · IVF hydration with NSS  · Continue monitoring CMP  · Consider Nephrology consult if sodium decreases to <130    Abnormal LFTs (liver function tests)  Assessment & Plan  Lab Results   Component Value Date    ALT 20 09/15/2023    AST 11 (L) 09/15/2023    ALKPHOS 116 (H) 09/15/2023    TBILI 1.33 (H) 09/15/2023        · Possibly 2/2 liver injury from illicit drug use   · HIV/acute hepatitis panel pending  · On exam, no RUQ TTP  · Initiate IVFs  · Continue monitoring CMP  · Encourage alcohol cessation       Class 2 obesity with body mass index (BMI) of 35.0 to 35.9 in adult  Assessment & Plan  · Body mass index is 31.31 kg/m². · Last Hgb A1c 6.4% on 5/3/23  · Last lipid panel WNL in 2018 - will repeat lipid panel  · Recommend healthy diet, lifestyle modifications, cessation of all substance use      Tobacco use  Assessment & Plan  · Daily tobacco user   Offer NRT  Encourage cessation          Additional medical treatment(s) includes as above       VTE Prophylaxis: Enoxaparin (Lovenox)  / sequential compression device   Code Status: full code      Anticipated Length of Stay:  Patient will be admitted on an Observation basis with an anticipated length of stay of  >1  midnights. Justification for Hospital Stay: opioid withdrawal, opioid use disorder, dehydration    For any questions or concerns, please Tiger Text the advanced practitioner in the role of [facility]-DETOX-AP On Call      This patient qualifies for Level IV medically managed intensive inpatient services under the criteria set by the American Society of Addiction Medicine, including dimensions 1-3.  The patient is in withdrawal (or is intoxicated with high risk of withdrawal), with severe and unstable medical and/or psychiatric (dual diagnosis) problems, requiring requires 24-hour medical and nursing care and the full resources of a licensed hospital.          [facility] patient to medical detox unit and continue supportive care and stabilization of acute opioid withdrawal per medical toxicology/detox medication assisted treatment pathway. Complicated Opioid Withdrawal (ie associated with long acting agents, such as methadone or illicit fentanyl analogues):  • >72 hours: Routine COWS/induction as per uncomplicated opoid withdrawal (below)  • <72 hours:  • Adjunctive medications (see below), including clonazepam 1-2mg Q6 hrs PRN anxiety (or diazepam 5 mg if cannot take PO)  • >48 hours, test dose buprenorphine 0.5-1mg. • If responds well, continue with 2mg Q2 hrs (total 8mg) holding for worsening withdrawal, then start maintenance dosing   • If responds poorly, follow next step below   • <48 hours and/or COWS < 6 or failed test dose, add Butrans transdermal patch 20-40mcg/hr, monitor for signs/symptoms of withdrawal   • If within first 24-48 hrs, can administer buprenorphine 0.5-1mg Q6 hrs x 4, followed by 2mg Q2 hrs x 4 the next day  • If surpassing 48 hrs, can administer buprenorphine 2mg Q2hrs if tolerated without transdermal patch or lower sublingual dose. • When complete, remove transdermal patch and start maintenance dosing   • For moderate-severe withdrawal (COWS >/= 8, may still consider routine induction with buprenorphine 8 mg if appropriate. • For worsened or precipitated withdrawal, consider adjunctive benzodiazepines and other comfort meds.  Dexmedetomidine may be considered for severe precipitated withdrawal.         Uncomplicated Opioid Withdrawal:  Monitor opioid severity via Clinical Opioid Withdrawal Scale (COWS) Q4 hours and administer buprenorphine/naloxone 8mg/2mg when COWS >8, or when greater than 24 hours have elapsed from most recent opioid use (excluding long-acting opioids, such as methadone). Continue to monitor opioid severity Q30-60 minutes after first dose and administer additional buprenorphine 2-4mg every 30-60 minutes until COWS < 8 for two consecutive hours. Adjunctive medications administered as needed:  Clonidine 0.1 mg PO Q6 hours PRN anxiety or palpitations    Gabapentin 300mg PO Q8 hours PRN anxiety    Ibuprofen 600 mg PO Q6 hours PRN pain    Acetaminophen 1000mg PO Q8 hours PRN pain    Ondansetron 4 mg PO Q6 hours PRN N/V    Nicotine patch 7, 14, 21 mg  PRN nicotine withdrawal   Trazodone 50 mg PO QHS PRN sleep    Loperamide 4 mg PO PRN diarrhea up to 16 mg/day       The risks, benefits and mechanism of buprenorphine/naloxone were discussed and patient agreed to treatment. Case management consultation will take place to assist with coordination of subsequent treatment after discharge. Administer daily multivitamin. Evaluate and treat for coexisting substance use, such as nicotine. Discuss risk factors for infectious disease, such as history of intravenous drug abuse, and offer hepatitis and HIV screening if indicated. Hx and PE limited by: pt somnolent    HPI: Rick Llanes is a 34y.o. year old female with a PMH of OUD, DM2, HTN, and methamphetamine abuse who presents with opioid withdrawal seeking detoxification. Patient initially presented to the [facility] ED requesting detoxification from opioids and was subsequently transferred to the [facility] medical detox unit for medically assisted opioid withdrawal and Suboxone induction. Patient seen/examined after receiving multiple supportive care medications and is currently somnolent but arouses to verbal/tactile stimulation. She reports using about 1 bundle of heroin/fentanyl daily via IV. Last opioid use was about 3 days ago, 9/13/23.  She denies current opioid w/d sx, stating "I am not awake enough yet." She also reports polysubstance abuse with regular methamphetamine use. Last used methamphetamine about 2 or 3 days ago. Patient agreeable to monitoring with supportive care at this time with plan for microinduction with Suboxone later today when she is more alert, given that over 48 hours have passed since her last opioid use. Patient's past treatment hx for OUD consists of prior Suboxone MAT. Opioids currently used: heroin and fentanyl  Route of use: intravenous  Date/Time of Last Opioid Use: about 3 days ago, 9/13/23  Current Signs/Symptoms of Opioid Withdrawal: pt denies presently    COWS score:   Clinical Opiate Withdrawal Scale  Pulse: 84  Resting Pulse Rate: Measured After Patient is Sitting or Lying for One Minute: Pulse rate   GI Upset: Over Last Half Hour: Nausea or loose stool  Sweating: Over Past Half Hour Not Accounted for by Room Temperature of Patient Activity: Subjective report of chills or flushing  Tremor: Observation of Outstretched Hands: No tremor  Restlessness: Observation During Assessment: Able to sit still  Yawning: Observation During Assessment: Yawning three or more times during assessment  Pupil Size: Pupils possibly larger than normal for room light  Anxiety and Irritability: Patient reports increasing irritability or anxiousness  Bone or Joint Aches: If Patient was Having Pain Previously, Only the Additional Component Attributed to Opiate Withdrawal is Scored: Patient reports severe diffuse aching of joints/muscle  Gooseflesh Skin: Piloerection of skin can be felt or hairs standing up on arms  Runny Nose or Tearing: Not Accounted for by Cold Symptoms or Allergies: Nasal stuffiness of unusually moist eyes  Clinical Opiate Withdrawal Scale Total Score: 14          Methadone & Buprenorphine History  History of prior treatment for opioid dependence?  Yes, prior [facility] Medical Detox Unit admission in which pt refused MAT initiation but did have symptomatic improvement with Precedex gtt  Currently on Methadone Maintenance? no  History of prior treatment with Suboxone? yes  Currently taking Suboxone? no  History of using Suboxone without having a prescription? yes  History of IVDA? yes  Co-existing substance use? Yes, methamphetamine use as above. She denies any other substance use, including BZD/EtOH. Review of PDMP: yes, no recent prescriptions    Social History     Substance and Sexual Activity   Alcohol Use Never     Social History     Substance and Sexual Activity   Drug Use Yes   • Types: Fentanyl, Heroin    Comment: 3 bags/day     Social History     Tobacco Use   Smoking Status Every Day   • Packs/day: 0.50   • Types: Cigarettes   Smokeless Tobacco Never       Review of Systems   Unable to perform ROS: Mental status change       Historical Information   Past Medical History:   Diagnosis Date   • Hormone imbalance     Pt states she has had since a teenager.    • Wears contact lenses      Past Surgical History:   Procedure Laterality Date   • CHOLECYSTECTOMY     • GALLBLADDER SURGERY      removed 7-8 years ago    • ID LAP RPR HRNA XCPT INCAL/INGUN NCRC8/STRANGULATED N/A 4/21/2022    Procedure: REPAIR HERNIA INCISIONAL LAPAROSCOPIC WITH MESH;  Surgeon: Aayush Welch MD;  Location: [facility];  Service: General   • TONSILLECTOMY       Family History   Problem Relation Age of Onset   • No Known Problems Father    • No Known Problems Brother    • No Known Problems Brother      Social History   Marital Status: Single   Patient Pre-hospital Living Situation: stable, lives with family  Patient Pre-hospital Level of Mobility: independent  Patient Pre-hospital Diet Restrictions: none    Allergies   Allergen Reactions   • Codeine Itching     Pt feels like her skin is "crawling"   • Amoxicillin Hives       Prior to Admission medications    Not on File       Current Facility-Administered Medications   Medication Dose Route Frequency   • acetaminophen (TYLENOL) tablet 650 mg  650 mg Oral Q6H PRN   • aluminum-magnesium hydroxide-simethicone (MAALOX) oral suspension 30 mL  30 mL Oral Q6H PRN   • cefTRIAXone (ROCEPHIN) IVPB (premix in dextrose) 1,000 mg 50 mL  1,000 mg Intravenous Q24H   • clonazePAM (KlonoPIN) tablet 2 mg  2 mg Oral Q6H PRN   • cloNIDine (CATAPRES) tablet 0.1 mg  0.1 mg Oral Q6H PRN   • diazepam (VALIUM) injection 5 mg  5 mg Intravenous Q6H PRN   • enoxaparin (LOVENOX) subcutaneous injection 40 mg  40 mg Subcutaneous Daily   • gabapentin (NEURONTIN) capsule 300 mg  300 mg Oral Q8H PRN   • insulin lispro (HumaLOG) 100 units/mL subcutaneous injection 1-5 Units  1-5 Units Subcutaneous HS   • insulin lispro (HumaLOG) 100 units/mL subcutaneous injection 1-6 Units  1-6 Units Subcutaneous TID AC   • loperamide (IMODIUM) capsule 2 mg  2 mg Oral Q4H PRN   • metoclopramide (REGLAN) injection 10 mg  10 mg Intravenous Q6H PRN   • multivitamin-minerals (CENTRUM) tablet 1 tablet  1 tablet Oral Daily   • nicotine (NICODERM CQ) 14 mg/24hr TD 24 hr patch 1 patch  1 patch Transdermal Daily   • ondansetron (ZOFRAN) injection 4 mg  4 mg Intravenous Q6H PRN   • sodium chloride 0.9 % infusion  100 mL/hr Intravenous Continuous       Continuous Infusions:sodium chloride, 100 mL/hr, Last Rate: Stopped (09/16/23 0615)             Objective       Intake/Output Summary (Last 24 hours) at 9/16/2023 0758  Last data filed at 9/16/2023 0615  Gross per 24 hour   Intake 970 ml   Output --   Net 970 ml       Invasive Devices:   Peripheral IV 09/15/23 Distal;Left;Upper;Ventral (anterior) Arm (Active)       Vitals   Vitals:    09/16/23 0543   BP: 144/84   TempSrc: Temporal   Pulse: 84   Resp: 18   Patient Position - Orthostatic VS: Lying   Temp: 97.6 °F (36.4 °C)       Physical Exam  Vitals and nursing note reviewed. Constitutional:       General: She is not in acute distress. Appearance: She is not diaphoretic. Comments: Pt somnolent, arouses to verbal/tactile stimulation and is able to follow commands   HENT:      Head: Normocephalic and atraumatic.       Right Ear: External ear normal.      Left Ear: External ear normal.      Nose: Nose normal.      Mouth/Throat:      Mouth: Mucous membranes are moist.   Eyes:      Extraocular Movements: Extraocular movements intact. Right eye: No nystagmus. Left eye: No nystagmus. Conjunctiva/sclera: Conjunctivae normal.      Pupils: Pupils are equal, round, and reactive to light. Cardiovascular:      Rate and Rhythm: Normal rate and regular rhythm. Pulses:           Dorsalis pedis pulses are 2+ on the right side and 2+ on the left side. Posterior tibial pulses are 2+ on the right side and 2+ on the left side. Heart sounds: Normal heart sounds. No murmur heard. No friction rub. No gallop. Pulmonary:      Effort: Pulmonary effort is normal. No respiratory distress. Breath sounds: Normal breath sounds. No wheezing, rhonchi or rales. Abdominal:      General: Bowel sounds are normal. There is no distension. Palpations: Abdomen is soft. Tenderness: There is no abdominal tenderness. There is no guarding or rebound. Musculoskeletal:         General: No swelling. Cervical back: Neck supple. Right lower leg: No edema. Left lower leg: No edema. Skin:     General: Skin is warm and dry. Findings: No rash. Neurological:      General: No focal deficit present. Mental Status: She is oriented to person, place, and time. GCS: GCS eye subscore is 3. GCS verbal subscore is 5. GCS motor subscore is 6. Cranial Nerves: No dysarthria or facial asymmetry. Motor: No tremor (No tremor at rest). Psychiatric:         Attention and Perception: She does not perceive auditory or visual hallucinations. Mood and Affect: Affect is blunt. Speech: Speech normal.         Behavior: Behavior is uncooperative and withdrawn. Comments: Limited 2/2 somnolence. Pt does not actively participate in this encounter, indicates that she wants to go back to sleep. DATA    EKG, Pathology, and Other Studies: I have personally reviewed pertinent reports. EKG: pending    Lab Results: I have personally reviewed pertinent reports. CBC ETOH     Lab Results   Component Value Date    WBC 13.99 (H) 09/15/2023    WBC 6.6 04/20/2018    RBC 6.21 (H) 09/15/2023    RBC 4.74 04/20/2018    HGB 17.6 (H) 09/15/2023    HGB 13.9 04/20/2018    HCT 51.3 (H) 09/15/2023    HCT 41.9 04/20/2018    MCV 83 09/15/2023    MCV 88.3 04/20/2018    MCH 28.3 09/15/2023    MCH 29.3 04/20/2018    MCHC 34.3 09/15/2023    MCHC 33.1 04/20/2018    RDW 11.9 09/15/2023    RDW 12.8 04/20/2018     (H) 09/15/2023     04/20/2018    MPV 10.4 09/15/2023    MPV 8.5 (L) 04/20/2018      No results found for: "LACTICACID"   CMP UA         Component Value Date/Time     04/20/2018 1011    K 4.7 09/15/2023 2054    K 4.5 04/20/2018 1011    CL 82 (L) 09/15/2023 2054     (H) 04/20/2018 1011    CO2 25 09/15/2023 2054    CO2 25 04/20/2018 1011    BUN 25 09/15/2023 2054    BUN 14 04/20/2018 1011    CREATININE 1.08 09/15/2023 2054    CREATININE 0.96 04/20/2018 1011         Component Value Date/Time    CALCIUM 10.9 (H) 09/15/2023 2054    CALCIUM 9.3 04/20/2018 1011    ALKPHOS 116 (H) 09/15/2023 2054    ALKPHOS 73 04/20/2018 1011    AST 11 (L) 09/15/2023 2054    AST 17 04/20/2018 1011    ALT 20 09/15/2023 2054    ALT 25 04/20/2018 1011    BILITOT 0.8 04/20/2018 1011      Lab Results   Component Value Date    CLARITYU Clear 09/15/2023    COLORU Yellow 09/15/2023    SPECGRAV 1.020 09/15/2023    PHUR 5.0 09/15/2023    GLUCOSEU >=1000 (1%) (A) 09/15/2023    KETONESU >=80 (3+) (A) 09/15/2023    BLOODU Negative 09/15/2023    PROTEIN UA Trace (A) 09/15/2023    NITRITE Negative 09/15/2023    BILIRUBINUR Negative 09/15/2023    UROBILINOGEN 0.2 09/15/2023    LEUKOCYTESUR (A) 09/15/2023     Elevated glucose may cause decreased leukocyte values.  See urine microscopic for [facility] result    WBCUA 4-10 (A) 09/15/2023    RBCUA 0-1 09/15/2023    BACTERIA Moderate (A) 09/15/2023    EPIS Moderate (A) 09/15/2023        Liver Function Test: ASA     Lab Results   Component Value Date    TBILI 1.33 (H) 09/15/2023    ALKPHOS 116 (H) 09/15/2023    ALKPHOS 73 04/20/2018    AST 11 (L) 09/15/2023    AST 17 04/20/2018    ALT 20 09/15/2023    ALT 25 04/20/2018    TP 8.5 (H) 09/15/2023    ALB 4.6 09/15/2023    ALB < 0.7 04/20/2018    ALB 4.2 04/20/2018      No results found for: "SALICYLATE"   Troponin APAP     No results found for: "TROPONINI"   No results found for: "ACTMNPHEN"   VBG HCG     No results found for: "PHVEN", "YQS4UKG", "PO2VEN", "MQC7QZW", "Pinkie Look", "H5OROHBNR", "W2RVMPE"   No results found for: "HCGQUANT"   ABG Urine Drug Screen     No results found for: "PHART", "KSV6OFP", "PO2ART", "FVQ5WME", "BEART", "P6MXVHESY", "O2HGB", "SOURC", "IRIS", "VTAC", "Budd Shafer", "INSPIREDAIR", "PEEP"   No results found for: "AMPMETHUR", "Hickory Saida", "Alejandra Flick", "Deette Jumbo", "Carry Trevor", "OPIATEUR", "PCPUR", "Sisto Stare", "OXYCODONEUR"   Lactate INR     No results found for: "LACTICACID"   No results found for: "INR"   PTT Protime     No results found for: "PTT"   No results found for: "PROTIME"   Hepatitis HIV     Lab Results   Component Value Date    HEPBSAG Non-reactive 05/02/2023    HEPCAB Non-reactive 05/02/2023      Lab Results   Component Value Date    [id number] Non-Reactive 05/02/2023    [id number] Non-Reactive 05/02/2023            Imaging Studies: I have personally reviewed pertinent reports. Counseling / Coordination of Care  Total floor / unit time spent today 50 minutes. Greater than 50% of total time was spent with the patient and / or family counseling and / or coordination of care. ** Please Note: This note has been constructed using a voice recognition system.  **

## 2023-09-16 NOTE — ASSESSMENT & PLAN NOTE
Lab Results   Component Value Date    ALT 20 09/15/2023    AST 11 (L) 09/15/2023    ALKPHOS 116 (H) 09/15/2023    TBILI 1.33 (H) 09/15/2023        · Possibly 2/2 liver injury from illicit drug use   · HIV/acute hepatitis panel pending  · On exam, no RUQ TTP  · Initiate IVFs  · Continue monitoring CMP  · Encourage alcohol cessation

## 2023-09-16 NOTE — PROCEDURES
EKG 9/16/2023 0816: normal sinus rhythm, ventricular rate 90 bpm. QTc 447 ms. No acute ST elevation/depression.  No significant changes compared to EKG 5/3/2023

## 2023-09-16 NOTE — ASSESSMENT & PLAN NOTE
· Patient with a history of chronic opioid use  · Last use was about 3 days ago (9/13)  · Initiate MAT/opioid withdrawal protocol with plan for eventual microinduction with Suboxone  · Presented with opioid withdrawal sx of primarily nausea/vomiting and anxiety - which improved with Klonopin and Reglan  · Currently resting comfortably in NAD, denies significant withdrawal sx at present  · As >48 hours have passed since pt's last use, plan to initiate buprenorphine microinduction with test dose 0.5 mg Subutex and advance as tolerated   · Symptomatic and supportive care  · Continuous pulse oximetry monitoring

## 2023-09-16 NOTE — PLAN OF CARE
Problem: PAIN - ADULT  Goal: Verbalizes/displays adequate comfort level or baseline comfort level  Description: Interventions:  - Encourage patient to monitor pain and request assistance  - Assess pain using appropriate pain scale  - Administer analgesics based on type and severity of pain and evaluate response  - Implement non-pharmacological measures as appropriate and evaluate response  - Consider cultural and social influences on pain and pain management  - Notify physician/advanced practitioner if interventions unsuccessful or patient reports new pain  Outcome: Not Progressing     Problem: INFECTION - ADULT  Goal: Absence or prevention of progression during hospitalization  Description: INTERVENTIONS:  - Assess and monitor for signs and symptoms of infection  - Monitor lab/diagnostic results  - Monitor all insertion sites, i.e. indwelling lines, tubes, and drains  - Monitor endotracheal if appropriate and nasal secretions for changes in amount and color  - Winger appropriate cooling/warming therapies per order  - Administer medications as ordered  - Instruct and encourage patient and family to use good hand hygiene technique  - Identify and instruct in appropriate isolation precautions for identified infection/condition  Outcome: Not Progressing  Goal: Absence of fever/infection during neutropenic period  Description: INTERVENTIONS:  - Monitor WBC    Outcome: Not Progressing     Problem: SAFETY ADULT  Goal: Patient will remain free of falls  Description: INTERVENTIONS:  - Educate patient/family on patient safety including physical limitations  - Instruct patient to call for assistance with activity   - Consult OT/PT to assist with strengthening/mobility   - Keep Call bell within reach  - Keep bed low and locked with side rails adjusted as appropriate  - Keep care items and personal belongings within reach  - Initiate and maintain comfort rounds  - Make Fall Risk Sign visible to staff  - Apply yellow socks and bracelet for high fall risk patients  - Consider moving patient to room near nurses station  Outcome: Not Progressing  Goal: Maintain or return to baseline ADL function  Description: INTERVENTIONS:  -  Assess patient's ability to carry out ADLs; assess patient's baseline for ADL function and identify physical deficits which impact ability to perform ADLs (bathing, care of mouth/teeth, toileting, grooming, dressing, etc.)  - Assess/evaluate cause of self-care deficits   - Assess range of motion  - Assess patient's mobility; develop plan if impaired  - Assess patient's need for assistive devices and provide as appropriate  - Encourage maximum independence but intervene and supervise when necessary  - Involve family in performance of ADLs  - Assess for home care needs following discharge   - Consider OT consult to assist with ADL evaluation and planning for discharge  - Provide patient education as appropriate  Outcome: Not Progressing  Goal: Maintains/Returns to pre admission functional level  Description: INTERVENTIONS:  - Perform BMAT or MOVE assessment daily.   - Set and communicate daily mobility goal to care team and patient/family/caregiver.    - Collaborate with rehabilitation services on mobility goals if consulted  - Out of bed for meals 1 times a day  - Out of bed for toileting  - Record patient progress and toleration of activity level   Outcome: Not Progressing     Problem: DISCHARGE PLANNING  Goal: Discharge to home or other facility with appropriate resources  Description: INTERVENTIONS:  - Identify barriers to discharge w/patient and caregiver  - Arrange for needed discharge resources and transportation as appropriate  - Identify discharge learning needs (meds, wound care, etc.)  - Arrange for interpretive services to assist at discharge as needed  - Refer to Case Management Department for coordinating discharge planning if the patient needs post-hospital services based on physician/advanced practitioner order or complex needs related to functional status, cognitive ability, or social support system  Outcome: Not Progressing     Problem: Knowledge Deficit  Goal: Patient/family/caregiver demonstrates understanding of disease process, treatment plan, medications, and discharge instructions  Description: Complete learning assessment and assess knowledge base.   Interventions:  - Provide teaching at level of understanding  - Provide teaching via preferred learning methods  Outcome: Not Progressing

## 2023-09-16 NOTE — ASSESSMENT & PLAN NOTE
· Na 124, corrected Na 130 to account for hyperglycemia on admission  · IVF hydration with NSS  · Continue monitoring CMP  · Consider Nephrology consult if sodium decreases to <130

## 2023-09-16 NOTE — ED PROVIDER NOTES
Final Diagnosis:  1. Polysubstance abuse ([facility])    2. Opioid withdrawal ([address] [facility])    3. Opioid use disorder, severe, dependence [facility])        Chief Complaint   Patient presents with   • Detox Evaluation     Patient reports she is detoxing from fentanyl and meth. Last use for fentanyl 2 days ago last use of meth yesterday. Patient reports vomiting this morning      HPI  Patient presents for evaluation of withdrawal symptoms. Patient states that she has a history of drug use including methamphetamines as well as fentanyl or heroin. Patient states that she has used heroin in the past but she thinks she is more likely to be getting fentanyl at this point. She is an IV drug user. Injection sites on forearms. Patient states that she last used approximately 2 days ago. States that she has been having diffuse body chills, vomiting, body discomfort as well. Patient states that she would like to go to the detox unit. Review of records show that the patient was seen here in May. At that time she came in altered and combative. She needed multiple medications to help sedate. She was started on a Precedex drip and then transferred to the detox unit. She stayed there approximately 1 day and was discharged secondary to his significant improvement of her symptoms. Unless otherwise specified:  - No language barrier.   - History obtained from patient. - There are no limitations to the history obtained. - Previous charting was reviewed    PMH:   has a past medical history of Hormone imbalance and Wears contact lenses. PSH:   has a past surgical history that includes Tonsillectomy; Gallbladder surgery; Cholecystectomy; and pr lap rpr hrna xcpt incal/ingun ncrc8/strangulated (N/A, 4/21/2022). ROS:  Review of Systems   - 13 point ROS was performed and all are normal unless stated in the history above. - Nursing note reviewed. Vitals reviewed. - Orders placed by myself and/or advanced practitioner / resident. PE:   Vitals:    09/15/23 1939   BP: 132/63   BP Location: Left arm   Pulse: 98   Resp: 18   Temp: (!) 97.3 °F (36.3 °C)   SpO2: 97%   Weight: 101 kg (222 lb 10.6 oz)   Height: 5' 6" (1.676 m)     Vitals reviewed by me. Patient has somewhat labile mood but is otherwise nonconfrontational.  She does have some injection marks on bilateral forearms. No obvious signs of infection there. No signs of trauma. Unless otherwise specified above:    General: VS reviewed  Appears in NAD    Head: Normocephalic, atraumatic  Eyes: EOM-I.     ENT: Atraumatic external nose and ears. No drooling. Neck: No JVD. CV: No pallor noted  Lungs:   No tachypnea  No respiratory distress    Abdomen:  Soft, non-tender, non-distended    MSK:   No obvious deformity    Skin: Dry, intact. No obvious rash. Psychiatric/Behavioral: Appropriate mood and affect   Exam: deferred    Physical Exam     Procedures   A:  - Nursing note reviewed.                       No orders to display     Orders Placed This Encounter   Procedures   • CBC and differential   • Comprehensive metabolic panel   • UA w Reflex to Microscopic w Reflex to Culture   • CK   • Urine Microscopic   • Beta Hydroxybutyrate   • Blood gas, venous   • POCT pregnancy, urine   • ECG 12 lead   • Transfer to other facility     Labs Reviewed   CBC AND DIFFERENTIAL - Abnormal       Result Value Ref Range Status    WBC 13.99 (*) 4.31 - 10.16 Thousand/uL Final    RBC 6.21 (*) 3.81 - 5.12 Million/uL Final    Hemoglobin 17.6 (*) 11.5 - 15.4 g/dL Final    Hematocrit 51.3 (*) 34.8 - 46.1 % Final    MCV 83  82 - 98 fL Final    MCH 28.3  26.8 - 34.3 pg Final    MCHC 34.3  31.4 - 37.4 g/dL Final    RDW 11.9  11.6 - 15.1 % Final    MPV 10.4  8.9 - 12.7 fL Final    Platelets 094 (*) 878 - 390 Thousands/uL Final    nRBC 0  /100 WBCs Final    Neutrophils Relative 85 (*) 43 - 75 % Final    Immat GRANS % 0  0 - 2 % Final    Lymphocytes Relative 11 (*) 14 - 44 % Final    Monocytes Relative 4  4 - 12 % Final    Eosinophils Relative 0  0 - 6 % Final    Basophils Relative 0  0 - 1 % Final    Neutrophils Absolute 11.78 (*) 1.85 - 7.62 Thousands/µL Final    Immature Grans Absolute 0.06  0.00 - 0.20 Thousand/uL Final    Lymphocytes Absolute 1.47  0.60 - 4.47 Thousands/µL Final    Monocytes Absolute 0.61  0.17 - 1.22 Thousand/µL Final    Eosinophils Absolute 0.02  0.00 - 0.61 Thousand/µL Final    Basophils Absolute 0.05  0.00 - 0.10 Thousands/µL Final   COMPREHENSIVE METABOLIC PANEL - Abnormal    Sodium 124 (*) 135 - 147 mmol/L Final    Potassium 4.7  3.5 - 5.3 mmol/L Final    Chloride 82 (*) 96 - 108 mmol/L Final    CO2 25  21 - 32 mmol/L Final    ANION GAP 17  mmol/L Final    BUN 25  5 - 25 mg/dL Final    Creatinine 1.08  0.60 - 1.30 mg/dL Final    Comment: Standardized to IDMS reference method    Glucose 502 (*) 65 - 140 mg/dL Final    Comment: If the patient is fasting, the ADA then defines impaired fasting glucose as > 100 mg/dL and diabetes as > or equal to 123 mg/dL. Calcium 10.9 (*) 8.4 - 10.2 mg/dL Final    AST 11 (*) 13 - 39 U/L Final    ALT 20  7 - 52 U/L Final    Comment: Specimen collection should occur prior to Sulfasalazine administration due to the potential for falsely depressed results. Alkaline Phosphatase 116 (*) 34 - 104 U/L Final    Total Protein 8.5 (*) 6.4 - 8.4 g/dL Final    Albumin 4.6  3.5 - 5.0 g/dL Final    Total Bilirubin 1.33 (*) 0.20 - 1.00 mg/dL Final    Comment: Use of this assay is not recommended for patients undergoing treatment with eltrombopag due to the potential for falsely elevated results. N-acetyl-p-benzoquinone imine (metabolite of Acetaminophen) will generate erroneously low results in samples for patients that have taken an overdose of Acetaminophen.     eGFR 64  ml/min/1.73sq m Final    Narrative:     [place] guidelines for Chronic Kidney Disease (CKD):   •  Stage 1 with normal or high GFR (GFR > 90 mL/min/1.73 square meters)  •  Stage 2 Mild CKD (GFR = 60-89 mL/min/1.73 square meters)  •  Stage 3A Moderate CKD (GFR = 45-59 mL/min/1.73 square meters)  •  Stage 3B Moderate CKD (GFR = 30-44 mL/min/1.73 square meters)  •  Stage 4 Severe CKD (GFR = 15-29 mL/min/1.73 square meters)  •  Stage 5 End Stage CKD (GFR <15 mL/min/1.73 square meters)  Note: GFR calculation is accurate only with a steady state creatinine   UA W REFLEX TO MICROSCOPIC WITH REFLEX TO CULTURE - Abnormal    Color, UA Yellow   Final    Clarity, UA Clear   Final    Specific Gravity, UA 1.020  1.003 - 1.030 Final    pH, UA 5.0  4.5, 5.0, 5.5, 6.0, 6.5, 7.0, 7.5, 8.0 Final    Leukocytes, UA   (*) Negative Final    Value: Elevated glucose may cause decreased leukocyte values.  See urine microscopic for UWBC result    Nitrite, UA Negative  Negative Final    Protein, UA Trace (*) Negative mg/dl Final    Glucose, UA >=1000 (1%) (*) Negative mg/dl Final    Ketones, UA >=80 (3+) (*) Negative mg/dl Final    Urobilinogen, UA 0.2  0.2, 1.0 E.U./dl E.U./dl Final    Bilirubin, UA Negative  Negative Final    Occult Blood, UA Negative  Negative Final   URINE MICROSCOPIC - Abnormal    RBC, UA 0-1  None Seen, 0-1, 1-2, 2-4, 0-5 /hpf Final    WBC, UA 4-10 (*) None Seen, 0-1, 1-2, 0-5, 2-4 /hpf Final    Epithelial Cells Moderate (*) None Seen, Occasional /hpf Final    Comment: SQUAMOUS    Bacteria, UA Moderate (*) None Seen, Occasional /hpf Final    AMORPH URATES Occasional  /hpf Final    OTHER CRYSTALS Occasional  /hpf Final    OTHER OBSERVATIONS Transitional Epithelial Cells   Final    Comment: OCCASIONALLY OBSERVED   BETA HYDROXYBUTYRATE - Abnormal    BETA-HYDROXYBUTYRATE 2.8 (*) <0.6 mmol/L Final   BLOOD GAS, VENOUS - Abnormal    pH, Kenny 7.430 (*) 7.300 - 7.400 Final    pCO2, Kenny 38.9 (*) 42.0 - 50.0 mm Hg Final    pO2, Kenny 23.5 (*) 35.0 - 45.0 mm Hg Final    HCO3, Kenny 25.2  24 - 30 mmol/L Final    Base Excess, Kenny 1.1  mmol/L Final    O2 Content, Kenny 10.3  ml/dL Final    O2 HGB, VENOUS 39.5 (*) 60.0 - 80.0 % Final   CK - Normal    Total CK 90  26 - 192 U/L Final   POCT PREGNANCY, URINE - Normal    EXT Preg Test, Ur Negative   Final    Control Valid   Final         Final Diagnosis:  1. Polysubstance abuse ([address])    2. Opioid withdrawal ([facility])    3. Opioid use disorder, severe, dependence ([address])        P:  -Patient presents for evaluation and detox symptoms. We will provide screening labs and reach out to the detox center to review the case with them. -Patient without other acute pathology. Transferred to detox unit for further care. Unless otherwise noted the patient's medications were reviewed and they should continue as directed. Medications   sodium chloride 0.9 % bolus 1,000 mL (0 mL Intravenous Stopped 9/15/23 2206)   ondansetron (ZOFRAN) injection 4 mg (4 mg Intravenous Given 9/15/23 2055)   midazolam (VERSED) injection 1 mg (1 mg Intravenous Given 9/15/23 2055)   ondansetron (ZOFRAN) injection 4 mg (4 mg Intravenous Given 9/15/23 2306)   LORazepam (ATIVAN) injection 1 mg (1 mg Intravenous Given 9/15/23 2306)   ondansetron (ZOFRAN) injection 4 mg (4 mg Intravenous Given 9/16/23 0228)     Time reflects when diagnosis was documented in both MDM as applicable and the Disposition within this note     Time User Action Codes Description Comment    9/16/2023 12:51 AM Татьяна Naik Add [F19.10] Polysubstance abuse ([address])     9/16/2023 12:52 AM Татьяна Naik Add [F11.93] Opioid withdrawal ([address])     9/16/2023  1:23 AM Yeimi RAMÍREZ Add [F11.20] Opioid use disorder, severe, dependence [facility])       ED Disposition     ED Disposition   Transfer to Another Facility-In Network    Condition   --    Date/Time   Sat Sep 16, 2023 12:51 AM    Comment   [place] [facility] should be transferred out to [facility].            MD Documentation    Flowsheet Row Most Recent Value   Patient Condition The patient has been stabilized such that within reasonable medical probability, no material deterioration of the patient condition or the condition of the unborn child(denise) is likely to result from the transfer   Reason for Transfer Other (Include comment)____________________  [Detox unit]   Benefits of Transfer Other benefits (Include comment)_______________________, Specialized equipment and/or services available at the receiving facility (Include comment)________________________  [toxicology]   Risks of Transfer Potential for delay in receiving treatment, Potential deterioration of medical condition, Loss of IV, Increased discomfort during transfer, Possible worsening of condition or death during transfer   Accepting Physician [facility] (Name & Tel number) PACS   Transported by (Company and Unit #) [employer] [facility]   Transported by (Company and Unit #) [facility]      Follow-up Information    None       There are no discharge medications for this patient. No discharge procedures on file. None       Portions of the record may have been created with voice recognition software. Occasional wrong word or "sound a like" substitutions may have occurred due to the inherent limitations of voice recognition software. Read the chart carefully and recognize, using context, where substitutions have occurred.     Electronically signed by:  MD Soumya Cerrato MD  09/16/23 1602

## 2023-09-16 NOTE — PROCEDURES
Central Line Insertion    Date/Time: 9/16/2023 4:05 PM    Performed by: Mehrdad Roca DO  Authorized by: Mehrdad Roca DO    Patient location:  Other (comment) (Toxicology Admitting/Detox)  Other Assisting Provider: Yes (comment)    Consent:     Consent obtained:  Written and verbal    Consent given by:  Patient    Risks discussed:  Arterial puncture, bleeding, infection, pneumothorax, incorrect placement and nerve damage    Alternatives discussed:  Alternative treatment  Universal protocol:     Procedure explained and questions answered to patient or proxy's satisfaction: yes      Relevant documents present and verified: yes      Immediately prior to procedure, a time out was called: yes      Patient identity confirmed:  Verbally with patient  Pre-procedure details:     Hand hygiene: Hand hygiene performed prior to insertion      Sterile barrier technique: All elements of maximal sterile technique followed      Skin preparation:  2% chlorhexidine    Skin preparation agent: Skin preparation agent completely dried prior to procedure    Indications:     Central line indications: no peripheral vascular access    Sedation:     Sedation type: Anxiolysis  Anesthesia (see MAR for exact dosages): Anesthesia method:  None  Procedure details:     Location:  Right internal jugular    Vessel type: vein      Catheter type:  Triple lumen    Catheter size:  7 Fr    Landmarks identified: yes      Ultrasound guidance: yes      Ultrasound image availability:  Not obtained due to urgency    Sterile ultrasound techniques: Sterile gel and sterile probe covers were used      Number of attempts:  1    Successful placement: yes    Post-procedure details:     Post-procedure:  Dressing applied and line sutured    Assessment:  Blood return through all ports and no pneumothorax on x-ray    Patient tolerance of procedure:   Tolerated well, no immediate complications

## 2023-09-16 NOTE — ASSESSMENT & PLAN NOTE
· E/b hemoconcentration on CBC with Hgb 17.6, Hct 51.3, WBC 13.99,   · IVF hydration  · Continue monitoring CBC

## 2023-09-16 NOTE — ASSESSMENT & PLAN NOTE
· Body mass index is 31.31 kg/m².    · Last Hgb A1c 6.4% on 5/3/23  · Last lipid panel WNL in 2018 - will repeat lipid panel  · Recommend healthy diet, lifestyle modifications, cessation of all substance use

## 2023-09-16 NOTE — ASSESSMENT & PLAN NOTE
Lab Results   Component Value Date    WBC 13.99 (H) 09/15/2023        • Elevated WBC on admission  • Possibly 2/2 leukemoid reaction from acute withdrawal and/or hemoconcentration from dehydration  • No evidence of active infection   o Pt afebrile, VSS  • Continue monitoring CBC, VS

## 2023-09-16 NOTE — ASSESSMENT & PLAN NOTE
Pt with a h/o polysubstance abuse with illicit opioids and methamphetamine  Last used methamphetamine about 2-3 days ago (9/13-9/14)  UDS pending  Withdrawal management as above  Encourage cessation of all substance use

## 2023-09-16 NOTE — ASSESSMENT & PLAN NOTE
· Patient with a history of chronic IV heroin/fentanyl use  · Uses about 1 bundle daily   · History of prior [facility] [address] [facility] admission in May 2023 - pt put herself into precipitated w/d with Suboxone PTA, was started on Precedex gtt in the ED which was continued on admission, pt felt improved and declined Suboxone, and was discharged to home after being monitored for 4 hours off the Precedex gtt   · Management of opioid withdrawal under MAT protocol as above  · Screen for hepatitis/HIV with h/o IVDU  · Case management consulted for assistance with aftercare resources

## 2023-09-17 PROBLEM — F11.93 OPIOID WITHDRAWAL (HCC): Status: RESOLVED | Noted: 2023-05-03 | Resolved: 2023-09-17

## 2023-09-17 PROBLEM — D72.829 LEUKOCYTOSIS: Status: RESOLVED | Noted: 2023-05-03 | Resolved: 2023-09-17

## 2023-09-17 PROBLEM — E86.0 DEHYDRATION: Status: RESOLVED | Noted: 2023-09-16 | Resolved: 2023-09-17

## 2023-09-17 PROBLEM — R79.89 ABNORMAL LFTS (LIVER FUNCTION TESTS): Status: RESOLVED | Noted: 2023-09-16 | Resolved: 2023-09-17

## 2023-09-17 PROBLEM — R79.89 PSEUDOHYPONATREMIA: Status: ACTIVE | Noted: 2023-09-17

## 2023-09-17 LAB
ANION GAP SERPL CALCULATED.3IONS-SCNC: 9 MMOL/L
BUN SERPL-MCNC: 18 MG/DL (ref 5–25)
CALCIUM SERPL-MCNC: 8.5 MG/DL (ref 8.4–10.2)
CHLORIDE SERPL-SCNC: 92 MMOL/L (ref 96–108)
CO2 SERPL-SCNC: 29 MMOL/L (ref 21–32)
CREAT SERPL-MCNC: 0.94 MG/DL (ref 0.6–1.3)
ERYTHROCYTE [DISTWIDTH] IN BLOOD BY AUTOMATED COUNT: 11.9 % (ref 11.6–15.1)
GFR SERPL CREATININE-BSD FRML MDRD: 76 ML/MIN/1.73SQ M
GLUCOSE SERPL-MCNC: 327 MG/DL (ref 65–140)
GLUCOSE SERPL-MCNC: 334 MG/DL (ref 65–140)
GLUCOSE SERPL-MCNC: 338 MG/DL (ref 65–140)
GLUCOSE SERPL-MCNC: 341 MG/DL (ref 65–140)
GLUCOSE SERPL-MCNC: 355 MG/DL (ref 65–140)
GLUCOSE SERPL-MCNC: 390 MG/DL (ref 65–140)
HCT VFR BLD AUTO: 44 % (ref 34.8–46.1)
HGB BLD-MCNC: 14.8 G/DL (ref 11.5–15.4)
HIV 1+2 AB+HIV1 P24 AG SERPL QL IA: NORMAL
HIV1 P24 AG SER QL: NORMAL
MAGNESIUM SERPL-MCNC: 2.1 MG/DL (ref 1.9–2.7)
MCH RBC QN AUTO: 28.2 PG (ref 26.8–34.3)
MCHC RBC AUTO-ENTMCNC: 33.6 G/DL (ref 31.4–37.4)
MCV RBC AUTO: 84 FL (ref 82–98)
PLATELET # BLD AUTO: 260 THOUSANDS/UL (ref 149–390)
PMV BLD AUTO: 9.7 FL (ref 8.9–12.7)
POTASSIUM SERPL-SCNC: 3.7 MMOL/L (ref 3.5–5.3)
RBC # BLD AUTO: 5.24 MILLION/UL (ref 3.81–5.12)
SODIUM SERPL-SCNC: 130 MMOL/L (ref 135–147)
WBC # BLD AUTO: 9.69 THOUSAND/UL (ref 4.31–10.16)

## 2023-09-17 PROCEDURE — 87806 HIV AG W/HIV1&2 ANTB W/OPTIC: CPT

## 2023-09-17 PROCEDURE — 83036 HEMOGLOBIN GLYCOSYLATED A1C: CPT | Performed by: INTERNAL MEDICINE

## 2023-09-17 PROCEDURE — 80074 ACUTE HEPATITIS PANEL: CPT

## 2023-09-17 PROCEDURE — 99252 IP/OBS CONSLTJ NEW/EST SF 35: CPT | Performed by: INTERNAL MEDICINE

## 2023-09-17 PROCEDURE — HZ2ZZZZ DETOXIFICATION SERVICES FOR SUBSTANCE ABUSE TREATMENT: ICD-10-PCS | Performed by: EMERGENCY MEDICINE

## 2023-09-17 PROCEDURE — 82948 REAGENT STRIP/BLOOD GLUCOSE: CPT

## 2023-09-17 PROCEDURE — 85027 COMPLETE CBC AUTOMATED: CPT | Performed by: PHYSICIAN ASSISTANT

## 2023-09-17 PROCEDURE — 83735 ASSAY OF MAGNESIUM: CPT | Performed by: PHYSICIAN ASSISTANT

## 2023-09-17 PROCEDURE — 80048 BASIC METABOLIC PNL TOTAL CA: CPT | Performed by: PHYSICIAN ASSISTANT

## 2023-09-17 PROCEDURE — 99232 SBSQ HOSP IP/OBS MODERATE 35: CPT | Performed by: EMERGENCY MEDICINE

## 2023-09-17 RX ORDER — INSULIN LISPRO 100 [IU]/ML
1-6 INJECTION, SOLUTION INTRAVENOUS; SUBCUTANEOUS
Status: DISCONTINUED | OUTPATIENT
Start: 2023-09-17 | End: 2023-09-18 | Stop reason: HOSPADM

## 2023-09-17 RX ORDER — HYDROXYZINE 50 MG/1
50 TABLET, FILM COATED ORAL EVERY 6 HOURS PRN
Status: DISCONTINUED | OUTPATIENT
Start: 2023-09-17 | End: 2023-09-18 | Stop reason: HOSPADM

## 2023-09-17 RX ORDER — INSULIN GLARGINE 100 [IU]/ML
25 INJECTION, SOLUTION SUBCUTANEOUS
Status: DISCONTINUED | OUTPATIENT
Start: 2023-09-17 | End: 2023-09-18 | Stop reason: HOSPADM

## 2023-09-17 RX ORDER — TRAZODONE HYDROCHLORIDE 50 MG/1
50 TABLET ORAL
Status: DISCONTINUED | OUTPATIENT
Start: 2023-09-17 | End: 2023-09-18 | Stop reason: HOSPADM

## 2023-09-17 RX ORDER — INSULIN LISPRO 100 [IU]/ML
5 INJECTION, SOLUTION INTRAVENOUS; SUBCUTANEOUS
Status: DISCONTINUED | OUTPATIENT
Start: 2023-09-17 | End: 2023-09-18 | Stop reason: HOSPADM

## 2023-09-17 RX ORDER — LIDOCAINE HYDROCHLORIDE 10 MG/ML
5 INJECTION, SOLUTION EPIDURAL; INFILTRATION; INTRACAUDAL; PERINEURAL ONCE
Status: COMPLETED | OUTPATIENT
Start: 2023-09-17 | End: 2023-09-16

## 2023-09-17 RX ADMIN — CLONIDINE HYDROCHLORIDE 0.1 MG: 0.1 TABLET ORAL at 00:44

## 2023-09-17 RX ADMIN — BUPRENORPHINE AND NALOXONE 8 MG: 8; 2 FILM BUCCAL; SUBLINGUAL at 08:15

## 2023-09-17 RX ADMIN — INSULIN LISPRO 5 UNITS: 100 INJECTION, SOLUTION INTRAVENOUS; SUBCUTANEOUS at 08:15

## 2023-09-17 RX ADMIN — INSULIN LISPRO 5 UNITS: 100 INJECTION, SOLUTION INTRAVENOUS; SUBCUTANEOUS at 16:37

## 2023-09-17 RX ADMIN — SODIUM CHLORIDE 100 ML/HR: 0.9 INJECTION, SOLUTION INTRAVENOUS at 21:31

## 2023-09-17 RX ADMIN — INSULIN LISPRO 5 UNITS: 100 INJECTION, SOLUTION INTRAVENOUS; SUBCUTANEOUS at 11:14

## 2023-09-17 RX ADMIN — SODIUM CHLORIDE 100 ML/HR: 0.9 INJECTION, SOLUTION INTRAVENOUS at 10:39

## 2023-09-17 RX ADMIN — CEFTRIAXONE 1000 MG: 1 INJECTION, SOLUTION INTRAVENOUS at 08:15

## 2023-09-17 RX ADMIN — INSULIN GLARGINE 25 UNITS: 100 INJECTION, SOLUTION SUBCUTANEOUS at 21:28

## 2023-09-17 RX ADMIN — Medication 1 TABLET: at 08:15

## 2023-09-17 RX ADMIN — ACETAMINOPHEN 650 MG: 325 TABLET ORAL at 21:40

## 2023-09-17 RX ADMIN — TRAZODONE HYDROCHLORIDE 50 MG: 50 TABLET ORAL at 21:19

## 2023-09-17 RX ADMIN — BUPRENORPHINE AND NALOXONE 8 MG: 8; 2 FILM BUCCAL; SUBLINGUAL at 21:19

## 2023-09-17 RX ADMIN — HYDROXYZINE HYDROCHLORIDE 50 MG: 50 TABLET, FILM COATED ORAL at 21:19

## 2023-09-17 RX ADMIN — NICOTINE 1 PATCH: 14 PATCH, EXTENDED RELEASE TRANSDERMAL at 08:15

## 2023-09-17 RX ADMIN — SODIUM CHLORIDE 100 ML/HR: 0.9 INJECTION, SOLUTION INTRAVENOUS at 01:19

## 2023-09-17 NOTE — ASSESSMENT & PLAN NOTE
Lab Results   Component Value Date    HGBA1C 6.4 (H) 05/03/2023       Recent Labs     09/16/23  2134 09/17/23  0643 09/17/23  0751 09/17/23  1105   POCGLU 361* 334* 327* 341*       Blood Sugar Average: Last 72 hrs:  (P) [id number]   · Patient was diagnosed with diabetes in 2016 and she was hospitalized at that time  · Patient was started on insulin but then later transitioned to metformin  · She was on metformin 1000 mg with breakfast, 500 mg at bedtime   · Patient stated her blood sugar have been well controlled  · Hemoglobin A1c seems to be mostly well controlled with 6.4 on 5/3/2023 and 6.3 in 2018  · During admission, she presented with blood sugar 500s, currently in 300s range  · No evidence of DKA   · Patient has been eating well.   No more nausea or vomiting  · Will start Lantus 25 unit at nighttime, lispro 5 units 3 times daily AC while in the hospital  · Diabetic diet, sliding scale insulin  · We will recheck hemoglobin A1c  · If hemoglobin A1c is<10 can be discharged on metformin regimen (metformin seems to have better compliance than insulin home regimen)  · Patient might be a candidate for Ozempic

## 2023-09-17 NOTE — CONSULTS
[facility]  Consult  Name: Nury Galarza 34 y.o. female I MRN: [id number]  Unit/Bed#: 5T DETOX 631-64 I Date of Admission: 9/16/2023   Date of Service: 9/17/2023 I Hospital Day: 1    Inpatient consult to Internal Medicine  Consult performed by: Tianna Yoder MD  Consult ordered by: Meño Webb,           Assessment/Plan   Type 2 diabetes mellitus with hyperglycemia [facility])  Assessment & Plan  Lab Results   Component Value Date    HGBA1C 6.4 (H) 05/03/2023       Recent Labs     09/16/23  2134 09/17/23  0643 09/17/23  0751 09/17/23  1105   POCGLU 361* 334* 327* 341*       Blood Sugar Average: Last 72 hrs:  (P) [id number]   · Patient was diagnosed with diabetes in 2016 and she was hospitalized at that time  · Patient was started on insulin but then later transitioned to metformin  · She was on metformin 1000 mg with breakfast, 500 mg at bedtime   · Patient stated her blood sugar have been well controlled  · Hemoglobin A1c seems to be mostly well controlled with 6.4 on 5/3/2023 and 6.3 in 2018  · During admission, she presented with blood sugar 500s, currently in 300s range  · No evidence of DKA   · Patient has been eating well.   No more nausea or vomiting  · Will start Lantus 25 unit at nighttime, lispro 5 units 3 times daily AC while in the hospital  · Diabetic diet, sliding scale insulin  · We will recheck hemoglobin A1c  · If hemoglobin A1c is<10 can be discharged on metformin regimen (metformin seems to have better compliance than insulin home regimen)  · Patient might be a candidate for Ozempic        Pseudohyponatremia  Assessment & Plan  · Sodium 130 in the setting of high blood sugar 355 on morning labs   · corrected sodium 136  · Today, added Lantus and mealtime insulin in addition to sliding scale insulin  · Follow BMP    Class 2 obesity with body mass index (BMI) of 35.0 to 35.9 in adult  Assessment & Plan  · Lifestyle modification including diet and exercise  · Patient might be a candidate for Ozempic or Mounjaro  · Will need to check with  for insurance coverage    HTN (hypertension)  Assessment & Plan  · /68  · Patient was not on any home medications (was on lisinopril 20 mg daily in 2016)  · Monitor vital signs and blood pressure    Opioid use disorder, severe, dependence ([address])  Assessment & Plan  · Management per primary service         VTE Prophylaxis: Enoxaparin (Lovenox)  / sequential compression device       Total Time for Visit, including Counseling / Coordination of Care: 45 minutes. Greater than 50% of this total time spent on direct patient counseling and coordination of care. Chief Complaint:   Diabetic management    History of Present Illness:    Bart Scales is a 34 y.o. female with PMH of type II DM, HTN, opiate dependence who was admitted under detox service for opioid withdrawal management. We are consulted for medical management for diabetic control. During my encounter, patient stated she was diagnosed with diabetes in 2016. She was hospitalized at that time and received insulin. Later she was transition to metformin 1000 mg a.m. and 500 mg p.m. Currently, she was not on any medication. Her blood sugars are mostly well controlled. According to previous medical records, patient hemoglobin A1c has been around 6.3 and 6.4. Patient is currently eating well. No more nausea or vomiting. Patient is agreeable to start on insulin regimen while she is in the hospital.    Review of Systems:    Review of Systems Patient was seen and examined at bedside. The patient is eating well. She denies any nausea or vomiting at this time. Past Medical and Surgical History:     Past Medical History:   Diagnosis Date   • Hormone imbalance     Pt states she has had since a teenager.    • Wears contact lenses        Past Surgical History:   Procedure Laterality Date   • CHOLECYSTECTOMY     • GALLBLADDER SURGERY removed 7-8 years ago    • IA LAP RPR HRNA XCPT INCAL/INGUN NCRC8/STRANGULATED N/A 4/21/2022    Procedure: REPAIR HERNIA INCISIONAL LAPAROSCOPIC WITH MESH;  Surgeon: Xavier Mistry MD;  Location: [facility];  Service: General   • TONSILLECTOMY         Meds/Allergies:    Prior to Admission medications    Not on File     I have reviewed home medications with patient personally. Allergies: Allergies   Allergen Reactions   • Codeine Itching     Pt feels like her skin is "crawling"   • Amoxicillin Hives       Social History:     Marital Status: Single     Substance Use History:   Social History     Substance and Sexual Activity   Alcohol Use Never     Social History     Tobacco Use   Smoking Status Every Day   • Packs/day: 0.50   • Types: Cigarettes   Smokeless Tobacco Never     Social History     Substance and Sexual Activity   Drug Use Yes   • Types: Fentanyl, Heroin    Comment: 3 bags/day       Family History:    Family History   Problem Relation Age of Onset   • No Known Problems Father    • No Known Problems Brother    • No Known Problems Brother        Physical Exam:     Vitals:   Blood Pressure: 113/68 (09/17/23 1223)  Pulse: 82 (09/17/23 1223)  Temperature: (!) 97 °F (36.1 °C) (09/17/23 1223)  Temp Source: Temporal (09/17/23 1223)  Respirations: 16 (09/17/23 1223)  Height: 5' 6" (167.6 cm) (09/16/23 0543)  Weight - Scale: 88 kg (194 lb 0.1 oz) (09/16/23 0543)  SpO2: 97 % (09/17/23 1223)    Physical Exam    General: no acute distress  HEENT: NC/AT, PERRL, EOM - normal  Neck: Supple  Pulm/Chest: Normal chest wall expansion, clear breath sounds on both side  CVS: normal S1&S2, capillary refill <2s  Abd: soft, non tender, non distended, bowel sounds +  MSK: move all 4 extremities spontaneously  Skin: warm  CNS: no acute focal neuro deficit      Additional Data:     Lab Results: I have personally reviewed pertinent reports.       Results from last 7 days   Lab Units 09/17/23 1993 09/16/23  1610 09/15/23  2054 WBC Thousand/uL 9.69   < > 13.99*   HEMOGLOBIN g/dL 14.8   < > 17.6*   HEMATOCRIT % 44.0   < > 51.3*   PLATELETS Thousands/uL 260   < > 392*   NEUTROS PCT %  --   --  85*   LYMPHS PCT %  --   --  11*   MONOS PCT %  --   --  4   EOS PCT %  --   --  0    < > = values in this interval not displayed. Results from last 7 days   Lab Units 09/17/23  0633 09/16/23  1610   SODIUM mmol/L 130* 127*   POTASSIUM mmol/L 3.7 3.7   CHLORIDE mmol/L 92* 86*   CO2 mmol/L 29 27   BUN mg/dL 18 20   CREATININE mg/dL 0.94 0.96   ANION GAP mmol/L 9 14   CALCIUM mg/dL 8.5 9.2   ALBUMIN g/dL  --  4.2   TOTAL BILIRUBIN mg/dL  --  1.28*   ALK PHOS U/L  --  99   ALT U/L  --  17   AST U/L  --  10*   GLUCOSE RANDOM mg/dL 355* 374*         Results from last 7 days   Lab Units 09/17/23  1105 09/17/23  0751 09/17/23  0643 09/16/23  2134 09/16/23  1609 09/16/23  1141 09/16/23  0628   POC GLUCOSE mg/dl 341* 327* 334* 361* 358* 414* 444*               Imaging: I have personally reviewed pertinent reports. XR chest portable   Final Result by Mai Zimmerman MD (09/16 1732)      Right IJ catheter in adequate position with no evidence of pneumothorax or other acute pathology. Workstation performed: [id number]             EKG, Pathology, and Other Studies Reviewed on Admission:   · EKG: NSR, no acute ST-T changes    Allscripts / Epic Records Reviewed: Yes     ** Please Note: This note has been constructed using a voice recognition system.  **

## 2023-09-17 NOTE — ASSESSMENT & PLAN NOTE
· Sodium 130 in the setting of high blood sugar 355 on morning labs   · corrected sodium 136  · Today, added Lantus and mealtime insulin in addition to sliding scale insulin  · Follow BMP

## 2023-09-17 NOTE — ASSESSMENT & PLAN NOTE
· , , HDL 34  · Educated on lifestyle modification including diet and exercise  · Patient might be a candidate for Ozempic or Mounjaro  · Will need to check with  for insurance coverage

## 2023-09-17 NOTE — ASSESSMENT & PLAN NOTE
· Most Recent Blood Pressure: 113/68    · Blood pressure is normal after treatment of opioid withdrawal.  We will refrain from restarting antihypertensive medication.     · Follow up with PCP upon discharge

## 2023-09-17 NOTE — ASSESSMENT & PLAN NOTE
Pt with a h/o polysubstance abuse with illicit opioids and methamphetamine  Last used methamphetamine about 2-3 days ago (9/13-9/14)  Encourage cessation of all substance use

## 2023-09-17 NOTE — ASSESSMENT & PLAN NOTE
· /68  · Patient was not on any home medications (was on lisinopril 20 mg daily in 2016)  · Monitor vital signs and blood pressure

## 2023-09-17 NOTE — ASSESSMENT & PLAN NOTE
Lab Results   Component Value Date    HGBA1C 6.4 (H) 05/03/2023       Recent Labs     09/16/23  2134 09/17/23  0643 09/17/23  0751 09/17/23  1105   POCGLU 361* 334* 327* 341*       Blood Sugar Average: Last 72 hrs:  (P) 986.4613601614005518   · Patient with a history of DM2 +significant hyperglycemia on admission CMP with glucose 502  · Additional pertinent labs include:  · CBC: evidence of dehydration with hemoconcentration  · CMP: Na 124 (corrected Na 130), low Cl 82, CO2 25, anion gap 17  · Elevated BHB 2.8  · VBG: pH 7.430, pCO2 38.9, pO2 23.5, HCO3 25.2 - not indicative of acidosis/DKA  · General medicine consulted for additional glycemic control. Appreciate recommendations. We will also restart metformin.  Follow up o/p

## 2023-09-17 NOTE — UTILIZATION REVIEW
Initial Clinical Review    Pt initially presented to [facility] THE ED. Pt was transferred by EMS to [facility] for its Level IV medically managed intensive inpatient detox unit, not available at [facility]. Admission: Date/Time/Statement:  9/16/23 0526 observation   Admission Orders (From admission, onward)     Ordered        09/16/23 0526  Place in Observation  Once                      Orders Placed This Encounter   Procedures   • Place in Observation     Standing Status:   Standing     Number of Occurrences:   1     Order Specific Question:   Level of Care     Answer:   Med Surg [16]     Arrival Information     Patient transfer from [facility] THE to [facility] Detox                       chief complaint:  Withdrawal symptoms: diffuse body chills, vomiting, body discomfort        Initial Presentation: 34 y.o. female who presented to medical detox. Inpatient admission for evaluation and treatment of acute opioid withdrawal. Presented w/ request for detox from [facility]. Uses 1 bundle of heroin/fentanyl daily via IV, last use 9/13/23. On exam, somnolent, arouses to verbal/tactile stimulation and able to follow commands. Affect blunt. Behavior uncooperative and withdrawn . COWS 14. Plan: COWS monitoring w/ symptomatic supportive care, Butrans patch, IVF, micro induction of Suboxone when clinically indicated, telemetry, continuous pulse ox, Trend labs, replete electrolytes as needed. Continue PTA meds. Addendum - after arrival, intractable vomiting and dehydration. Unable to tolerate po. Access lost due to infiltration. Multiple attempts restart without success. Required central line for access. Given IVF. Haldol. Did well with test dose of Subutex. Date: 9/17/23  Day 2: Pt reports feeling . On exam, . COWS .  Plan: continue COWS monitoring w/ symptomatic supportive care, Butrans patch, micro induction of Suboxone, telemetry, continuous pulse ox, Trend labs, replete electrolytes as needed. Continue PTA meds. ED Triage Vitals [09/16/23 0543]   Temperature Pulse Respirations Blood Pressure SpO2   97.6 °F (36.4 °C) 84 18 144/84 97 %      Temp Source Heart Rate Source Patient Position - Orthostatic VS BP Location FiO2 (%)   Temporal Monitor Lying Left arm --      Pain Score       7          Wt Readings from Last 1 Encounters:   09/16/23 88 kg (194 lb 0.1 oz)     Additional Vital Signs:   09/16/23 2154 -- 99 18 -- -- 92 % None (Room air) --   09/16/23 2103 97.3 °F (36.3 °C) Abnormal  89 18 130/81 97 96 % None (Room air) Lying   09/16/23 1558 97.1 °F (36.2 °C) Abnormal  82 18 140/92 108 98 % None (Room air) Lying   09/16/23 1130 -- 77 18 136/84 -- 98 % None (Room air) Lying   09/16/23 0751 97.8 °F (36.6 °C) 73 18 148/82 -- 96 % None (Room air)        Clinical Opioid Withdrawal Scale (COWS):   9/16/23:  14 at 0543.   5 at 0751. Pertinent Labs/Diagnostic Test Results:   XR chest portable   Final Result by Nader Frazier MD (09/16 1732)      Right IJ catheter in adequate position with no evidence of pneumothorax or other acute pathology. Workstation performed: [id number]           9/17/23 ecg Normal sinus rhythm  Normal ECG  When compared with ECG of 03-MAY-2023 10:34,  Vent.  rate has increased BY  33 BPM  Nonspecific T wave abnormality now evident in Lateral leads  Results from last 7 days   Lab Units 09/17/23  0633 09/16/23  1610 09/15/23  2054   WBC Thousand/uL 9.69 13.12* 13.99*   HEMOGLOBIN g/dL 14.8 16.6* 17.6*   HEMATOCRIT % 44.0 47.8* 51.3*   PLATELETS Thousands/uL 260 347 392*   NEUTROS ABS Thousands/µL  --   --  11.78*     Results from last 7 days   Lab Units 09/16/23  1610 09/15/23  2054   SODIUM mmol/L 127* 124*   POTASSIUM mmol/L 3.7 4.7   CHLORIDE mmol/L 86* 82*   CO2 mmol/L 27 25   ANION GAP mmol/L 14 17   BUN mg/dL 20 25   CREATININE mg/dL 0.96 1.08   EGFR ml/min/1.73sq m 74 64   CALCIUM mg/dL 9.2 10.9*     Results from last 7 days   Lab Units 09/16/23  1610 09/15/23  2054   AST U/L 10* 11*   ALT U/L 17 20   ALK PHOS U/L 99 116*   TOTAL PROTEIN g/dL 7.7 8.5*   ALBUMIN g/dL 4.2 4.6   TOTAL BILIRUBIN mg/dL 1.28* 1.33*     Results from last 7 days   Lab Units 09/17/23  0643 09/16/23  2134 09/16/23  1609 09/16/23  1141 09/16/23  0628   POC GLUCOSE mg/dl 334* 361* 358* 414* 444*     Results from last 7 days   Lab Units 09/16/23  1610 09/15/23  2054   GLUCOSE RANDOM mg/dL 374* 502*     BETA-HYDROXYBUTYRATE   Date Value Ref Range Status   09/15/2023 2.8 (H) <0.6 mmol/L Final      Results from last 7 days   Lab Units 09/15/23  2308   PH BRANDON  7.430*   PCO2 BRANDON mm Hg 38.9*   PO2 BRANDON mm Hg 23.5*   HCO3 BRANDON mmol/L 25.2   BASE EXC BRANDON mmol/L 1.1   O2 CONTENT BRANDON ml/dL 10.3   O2 HGB, VENOUS % 39.5*     Results from last 7 days   Lab Units 09/15/23  2054   CK TOTAL U/L 90     Results from last 7 days   Lab Units 09/15/23  2149   CLARITY UA  Clear   COLOR UA  Yellow   SPEC GRAV UA  1.020   PH UA  5.0   GLUCOSE UA mg/dl >=1000 (1%)*   KETONES UA mg/dl >=80 (3+)*   BLOOD UA  Negative   PROTEIN UA mg/dl Trace*   NITRITE UA  Negative   BILIRUBIN UA  Negative   UROBILINOGEN UA E.U./dl 0.2   LEUKOCYTES UA  Elevated glucose may cause decreased leukocyte values. See urine microscopic for UWBC result*   WBC UA /hpf 4-10*   RBC UA /hpf 0-1   BACTERIA UA /hpf Moderate*   EPITHELIAL CELLS WET PREP /hpf Moderate*         Past Medical History:   Diagnosis Date   • Hormone imbalance     Pt states she has had since a teenager.    • Wears contact lenses      Present on Admission:  • Opioid withdrawal ([address])  • Opioid use disorder, severe, dependence (HCC)  • Tobacco use  • Type 2 diabetes mellitus with hyperglycemia (HCC)  • HTN (hypertension)  • Hyponatremia  • Dehydration  • Leukocytosis  • Abnormal LFTs (liver function tests)  • Polysubstance abuse (HCC)      Admitting Diagnosis: Polysubstance abuse ([address] [facility]) [F19.10]  Age/Sex: 34 y.o. female  Admission Orders:  Hubert/CHO controlled  Diet. SCDs. COWS monitoring. Telemetry & Continuous Pulse Ox. Scheduled Medications:  buprenorphine-naloxone, 8 mg, Sublingual, BID  cefTRIAXone, 1,000 mg, Intravenous, Q24H  enoxaparin, 40 mg, Subcutaneous, Daily  insulin lispro, 1-5 Units, Subcutaneous, HS  insulin lispro, 1-6 Units, Subcutaneous, TID AC  multivitamin-minerals, 1 tablet, Oral, Daily  nicotine, 1 patch, Transdermal, Daily    buprenorphine (SUBUTEX) 0.5 mg SL tablet (partial) 1 mg  Dose: 1 mg  Freq: Once Route: SL  Start: 09/16/23 1415 End: 09/16/23 1412    haloperidol lactate (HALDOL) injection 5 mg  Dose: 5 mg  Freq: Once Route: IM  Indications of Use: NAUSEA AND VOMITING  Start: 09/16/23 1230 End: 09/16/23 1224    LORazepam (ATIVAN) injection 2 mg  Dose: 2 mg  Freq: Once Route: IV  Start: 09/16/23 1515 End: 09/16/23 1521    lidocaine (PF) (XYLOCAINE-MPF) 1 % injection **ADS Override Pull**  Start: 09/16/23 1520 End: 09/16/23 1522      Continuous IV Infusions:  sodium chloride, 100 mL/hr, Intravenous, Continuous      PRN Meds:  acetaminophen, 650 mg, Oral, Q6H PRN x 2 9/16  aluminum-magnesium hydroxide-simethicone, 30 mL, Oral, Q6H PRN  clonazePAM, 2 mg, Oral, Q6H PRN x 2 9/16  cloNIDine, 0.1 mg, Oral, Q6H PRN x 1 9/17  diazepam, 5 mg, Intravenous, Q6H PRN  diphenhydramine, lidocaine, Al/Mg hydroxide, simethicone, 10 mL, Swish & Swallow, Q4H PRN  gabapentin, 300 mg, Oral, Q8H PRN  loperamide, 2 mg, Oral, Q4H PRN  ondansetron, 4 mg, Oral, Q6H PRN      buprenorphine, 0.5 mg, Sublingual,           Network Utilization Review Department  ATTENTION: Please call with any questions or concerns to [phone] and carefully listen to the prompts so that you are directed to the right person. All voicemails are confidential.  Safia Knox all requests for admission clinical reviews, approved or denied determinations and any other requests to dedicated fax number below belonging to the campus where the patient is receiving treatment.  List of dedicated fax numbers for the Facilities:  [place] DENIALS (Administrative/Medical Necessity) [phone] [address] (Maternity/NICU/Pediatrics) [phone]   [address] [place] [address] [phone]   [facility] [address] [facility] [phone] [id number] [id number] [place] [address] [phone]   [address] [address] [phone]

## 2023-09-17 NOTE — UTILIZATION REVIEW
Initial  Stay Review    Admission: Date/Time/Statement:  9/16/23 0526 observation AND CHANGED 9/17/23 1618 INPATIENT RE: PATIENT WITH NEED ONGOING DETOX AND MONITORING. Orders Placed This Encounter   Procedures   • Inpatient Admission     Standing Status:   Standing     Number of Occurrences:   1     Order Specific Question:   Level of Care     Answer:   Med Surg [16]     Order Specific Question:   Estimated length of stay     Answer:   More than 2 Midnights     Order Specific Question:   Certification     Answer:   I certify that inpatient services are medically necessary for this patient for a duration of greater than two midnights. See H&P and MD Progress Notes for additional information about the patient's course of treatment. Admission Orders (From admission, onward)     Ordered        09/17/23 1618  Inpatient Admission  Once                      Date: 9/17/23                          Current Patient Class: INPATIENT   Current Level of Care: detox    HPI:38 y.o. female initially admitted on 9/15/23 to [facility] and transfer to [facility] 9/16/23 and observation order [facility]  placed due to  evaluation and treatment of acute opioid withdrawal. Last used methamphetamine about 2-3 days ago (9/13-9/14). History of prior [address] [place] [address] Detox admission in May 2023     Assessment/Plan:   Date: 9/17/23  Day 2 CHANGED TO INPATIENT: Pt reports feeling better from opioid withdraw, on maintenance Suboxone now. Glucose elevated . On exam, . COWS . Plan: continue COWS monitoring w/ symptomatic supportive care, Butrans patch, micro induction of Suboxone, telemetry, continuous pulse ox, Trend labs, replete electrolytes as needed. Continue PTA meds. Consult medicine. 9/17/23 per medicine -  Patient with T2DM with hyperglycemia/pseudohyponatremia/Obesity/hypertension/Opioid use disorder, severe dependence.    Patient diagnosed with DM in 2016, initially on insulin, transitioned to metformin and currently not on any medication for Diabetes or hypertension at home. Plan is start Lantus 25 unit at nighttime, lispro 5 units 3 times daily AC . SSI with accuchecks. Diabetic diet. Check A1c. Trend BMP. Monitor BP. Vital Signs:   09/17/23 1223 97 °F (36.1 °C) Abnormal  82 16 113/68 83 97 % None (Room air) Lying   09/17/23 0748 97.2 °F (36.2 °C) Abnormal  78 16 109/47 Abnormal  67 93 % None (Room air) Lying   09/17/23 0040 97 °F (36.1 °C) Abnormal  86 18 124/82 96 95 % None (Room air)      Clinical opiate withdrawal scale  9/17/23:  5 at 0040. Pertinent Labs/Diagnostic Results:   XR chest portable   Final Result by Jt Muñoz MD (09/16 1732)      Right IJ catheter in adequate position with no evidence of pneumothorax or other acute pathology.             Workstation performed: [id number]           Results from last 7 days   Lab Units 09/17/23  0633 09/16/23  1610 09/15/23  2054   WBC Thousand/uL 9.69 13.12* 13.99*   HEMOGLOBIN g/dL 14.8 16.6* 17.6*   HEMATOCRIT % 44.0 47.8* 51.3*   PLATELETS Thousands/uL 260 347 392*   NEUTROS ABS Thousands/µL  --   --  11.78*     Results from last 7 days   Lab Units 09/17/23  0633 09/16/23  1610 09/15/23  2054   SODIUM mmol/L 130* 127* 124*   POTASSIUM mmol/L 3.7 3.7 4.7   CHLORIDE mmol/L 92* 86* 82*   CO2 mmol/L 29 27 25   ANION GAP mmol/L 9 14 17   BUN mg/dL 18 20 25   CREATININE mg/dL 0.94 0.96 1.08   EGFR ml/min/1.73sq m 76 74 64   CALCIUM mg/dL 8.5 9.2 10.9*   MAGNESIUM mg/dL 2.1  --   --      Results from last 7 days   Lab Units 09/16/23  1610 09/15/23  2054   AST U/L 10* 11*   ALT U/L 17 20   ALK PHOS U/L 99 116*   TOTAL PROTEIN g/dL 7.7 8.5*   ALBUMIN g/dL 4.2 4.6   TOTAL BILIRUBIN mg/dL 1.28* 1.33*     Results from last 7 days   Lab Units 09/17/23  1105 09/17/23  0751 09/17/23  0643 09/16/23  2134 09/16/23  1609 09/16/23  1141 09/16/23  0628   POC GLUCOSE mg/dl 341* 327* 334* 361* 358* 414* 444*     Results from last 7 days   Lab Units 09/17/23  0633 09/16/23  1610 09/15/23  2054   GLUCOSE RANDOM mg/dL 355* 374* 502*     BETA-HYDROXYBUTYRATE   Date Value Ref Range Status   09/15/2023 2.8 (H) <0.6 mmol/L Final      Results from last 7 days   Lab Units 09/15/23  2308   PH BRANDON  7.430*   PCO2 BRANDON mm Hg 38.9*   PO2 BRANDON mm Hg 23.5*   HCO3 BRANDON mmol/L 25.2   BASE EXC BRANDON mmol/L 1.1   O2 CONTENT BRANDON ml/dL 10.3   O2 HGB, VENOUS % 39.5*     Results from last 7 days   Lab Units 09/15/23  2054   CK TOTAL U/L 90     Results from last 7 days   Lab Units 09/15/23  2149   CLARITY UA  Clear   COLOR UA  Yellow   SPEC GRAV UA  1.020   PH UA  5.0   GLUCOSE UA mg/dl >=1000 (1%)*   KETONES UA mg/dl >=80 (3+)*   BLOOD UA  Negative   PROTEIN UA mg/dl Trace*   NITRITE UA  Negative   BILIRUBIN UA  Negative   UROBILINOGEN UA E.U./dl 0.2   LEUKOCYTES UA  Elevated glucose may cause decreased leukocyte values.  See urine microscopic for UWBC result*   WBC UA /hpf 4-10*   RBC UA /hpf 0-1   BACTERIA UA /hpf Moderate*   EPITHELIAL CELLS WET PREP /hpf Moderate*       Medications:   Scheduled Medications:  buprenorphine-naloxone, 8 mg, Sublingual, BID  cefTRIAXone, 1,000 mg, Intravenous, Q24H  enoxaparin, 40 mg, Subcutaneous, Daily  insulin lispro, 1-5 Units, Subcutaneous, HS  insulin lispro, 1-6 Units, Subcutaneous, TID AC  multivitamin-minerals, 1 tablet, Oral, Daily  nicotine, 1 patch, Transdermal, Daily    insulin glargine (LANTUS) subcutaneous injection 25 Units 0.25 mL  Dose: 25 Units  Freq: Daily at bedtime Route: SC  Start: 09/17/23 2200  insulin lispro (HumaLOG) 100 units/mL subcutaneous injection 1-6 Units  Dose: 1-6 Units  Freq: 3 times daily before meals Route: SC  Start: 09/17/23 1600      Continuous IV Infusions:  sodium chloride, 100 mL/hr, Intravenous, Continuous      PRN Meds:  acetaminophen, 650 mg, Oral, Q6H PRN  aluminum-magnesium hydroxide-simethicone, 30 mL, Oral, Q6H PRN  clonazePAM, 2 mg, Oral, Q6H PRN  cloNIDine, 0.1 mg, Oral, Q6H PRN x 1 9/17/23   diazepam, 5 mg, Intravenous, Q6H PRN  diphenhydramine, lidocaine, Al/Mg hydroxide, simethicone, 10 mL, Swish & Swallow, Q4H PRN  gabapentin, 300 mg, Oral, Q8H PRN  loperamide, 2 mg, Oral, Q4H PRN  ondansetron, 4 mg, Oral, Q6H PRN        Discharge Plan: to be determined      Network Utilization Review Department  ATTENTION: Please call with any questions or concerns to [phone] and carefully listen to the prompts so that you are directed to the right person. All voicemails are confidential.  Marina Tadeo all requests for admission clinical reviews, approved or denied determinations and any other requests to dedicated fax number below belonging to the campus where the patient is receiving treatment.  List of dedicated fax numbers for the Facilities:  [place] DENIALS (Administrative/Medical Necessity) [phone] [address] [facility] (Maternity/NICU/Pediatrics) [phone]   [address] [place] [address] Drive [phone]   [facility] [address] [facility] [phone]   [address] [place] [address] 207 [facility] [address] [place] [address] [id number] [place] [address] [phone]   [address] [facility] [address] [phone]

## 2023-09-17 NOTE — ASSESSMENT & PLAN NOTE
· Patient with a history of chronic IV heroin/fentanyl use  · Uses about 1 bundle daily   · History of prior [facility] Detox admission in May 2023 - pt put herself into precipitated w/d with Suboxone PTA, was started on Precedex gtt in the ED which was continued on admission, pt felt improved and declined Suboxone, and was discharged to home after being monitored for 4 hours off the Precedex gtt   · Screen for hepatitis/HIV with h/o IVDU  · Case management consulted for assistance with aftercare resources - pt will be discharged with OP MAT follow-up through the [facility] program. Rupert White provided upon discharge

## 2023-09-17 NOTE — ASSESSMENT & PLAN NOTE
· Body mass index is 31.31 kg/m².    · Last Hgb A1c 6.4% on 5/3/23  · Last lipid panel WNL in 2018 - will repeat lipid panel  · Recommend healthy diet, lifestyle modifications, cessation of all substance use  · Continue diabetic regimen

## 2023-09-17 NOTE — PROGRESS NOTES
PROGRESS NOTE  DEPARTMENT OF MEDICAL TOXICOLOGY  LEVEL 4 MEDICAL DETOX UNIT  Kailey Farr 34 y.o. female MRN: [id number]  Unit/Bed#: 5T DETOX 141-57 Encounter: [id number]      Reason for Admission/Principal Problem: Opioid withdrawal  Rounding Provider: Jazz Goncalves DO  Attending Provider: Jazz Goncalves DO   9/16/2023  5:05 AM           Pseudohyponatremia  Assessment & Plan  Secondary to hyperglycemia  Recheck basic metabolic panel    Polysubstance abuse ([address])  Assessment & Plan  Pt with a h/o polysubstance abuse with illicit opioids and methamphetamine  Last used methamphetamine about 2-3 days ago (9/13-9/14)  Encourage cessation of all substance use     Class 2 obesity with body mass index (BMI) of 35.0 to 35.9 in adult  Assessment & Plan  · Body mass index is 31.31 kg/m². · Last Hgb A1c 6.4% on 5/3/23  · Last lipid panel WNL in 2018 - will repeat lipid panel  · Recommend healthy diet, lifestyle modifications, cessation of all substance use  · For diabetes, will explore GLP 1 receptor agonist therapy for dual purposes of diabetes and weight loss. Hyponatremia  Assessment & Plan  · Stable at 130. Recheck BMP tomorrow. HTN (hypertension)  Assessment & Plan  · Most Recent Blood Pressure: 113/68    · Blood pressure is normal after treatment of opioid withdrawal.  We will refrain from restarting antihypertensive medication. May follow-up as outpatient for reassessment.     Type 2 diabetes mellitus with hyperglycemia [facility])  Assessment & Plan  Lab Results   Component Value Date    HGBA1C 6.4 (H) 05/03/2023       Recent Labs     09/16/23  2134 09/17/23  0643 09/17/23  0751 09/17/23  1105   POCGLU 361* 334* 327* 341*       Blood Sugar Average: Last 72 hrs:  (P) [id number]   · Patient with a history of DM2 +significant hyperglycemia on admission CMP with glucose 502  · Additional pertinent labs include:  · CBC: evidence of dehydration with hemoconcentration  · CMP: Na 124 (corrected Na 130), low Cl 82, CO2 25, anion gap 17  · Elevated BHB 2.8  · VBG: pH 7.430, pCO2 38.9, pO2 23.5, HCO3 25.2 - not indicative of acidosis/DKA  · General medicine consulted for additional glycemic control. Appreciate recommendations. We will also restart metformin. May also seek preapproval for GLP 1 agonist in lieu of insulin. Follow up o/p    Tobacco use  Assessment & Plan  · Daily tobacco user   Offer NRT  Encourage cessation    Opioid use disorder, severe, dependence ([address])  Assessment & Plan  · Patient with a history of chronic IV heroin/fentanyl use  · Uses about 1 bundle daily   · History of prior [address] [facility] [place] [facility] Detox admission in May 2023 - pt put herself into precipitated w/d with Suboxone PTA, was started on Precedex gtt in the ED which was continued on admission, pt felt improved and declined Suboxone, and was discharged to home after being monitored for 4 hours off the Precedex gtt   · Screen for hepatitis/HIV with h/o IVDU  · Case management consulted for assistance with aftercare resources - pt contemplating rehab.      Dehydration-resolved as of 9/17/2023  Assessment & Plan  · E/b hemoconcentration on CBC with Hgb 17.6, Hct 51.3, WBC 13.99,   · IVF hydration  · Continue monitoring CBC      Leukocytosis-resolved as of 9/17/2023  Assessment & Plan  Lab Results   Component Value Date    WBC 9.69 09/17/2023        • Elevated WBC on admission  • Possibly 2/2 leukemoid reaction from acute withdrawal and/or hemoconcentration from dehydration  • No evidence of active infection   o Pt afebrile, VSS  • Continue monitoring CBC, VS       * Opioid withdrawal (HCC)-resolved as of 9/17/2023  Assessment & Plan  · Currently stable on maintenance Subxoone  · Continuous pulse oximetry monitoring          VTE Pharmacologic Prophylaxis:   Pharmacologic: Enoxaparin (Lovenox)  Mechanical VTE Prophylaxis in Place: no    Code Status: Level 1 - Full Code    Patient Centered Rounds: I have performed bedside rounds with nursing staff today. Discussions with Specialists or Other Care Team Provider: General medicine    Education and Discussions with Family / Patient: Patient    Time Spent for Care: 20 minutes. More than 50% of total time spent on counseling and coordination of care as described above. Current Length of Stay: 1 day(s)    Current Patient Status: Observation     Certification Statement: The patient will continue to require additional inpatient hospital stay due to Stabilization of hyperglycemia prior to being appropriate for admission to a rehab Discharge Plan: Pending case management      Subjective:   Patient is feeling much better from opioid withdrawal.  She is stable on maintenance Suboxone now. She is tolerating p.o. She is awaiting case management evaluation.     Objective:     Clinical Opiate Withdrawal Scale  Pulse: (P) 85  Resting Pulse Rate: Measured After Patient is Sitting or Lying for One Minute: Pulse rate 80 or below  GI Upset: Over Last Half Hour: Nausea or loose stool  Sweating: Over Past Half Hour Not Accounted for by Room Temperature of Patient Activity: No report of chills or flushing  Tremor: Observation of Outstretched Hands: No tremor  Restlessness: Observation During Assessment: Able to sit still  Yawning: Observation During Assessment: No yawning  Pupil Size: Pupils possibly larger than normal for room light  Anxiety and Irritability: Patient obviously irritable or anxious  Bone or Joint Aches: If Patient was Having Pain Previously, Only the Additional Component Attributed to Opiate Withdrawal is Scored: Not present  Gooseflesh Skin: Skin is smooth  Runny Nose or Tearing: Not Accounted for by Cold Symptoms or Allergies: Not present  Clinical Opiate Withdrawal Scale Total Score: 5    No data recorded      Last 24 Hours Medication List:   Current Facility-Administered Medications   Medication Dose Route Frequency Provider Last Rate   • acetaminophen  650 mg Oral Q6H PRN Nasrin Nima Figueredo PA-C     • aluminum-magnesium hydroxide-simethicone  30 mL Oral Q6H PRN Nasrin Nima WINTER Figueredo     • buprenorphine-naloxone  8 mg Sublingual BID Becky Escudero PA-C     • cefTRIAXone  1,000 mg Intravenous Q24H Nasrintorin Figueredo PA-C 1,000 mg (23 0815)   • clonazePAM  2 mg Oral Q6H PRN Nasrin Nima Figueredo PA-C     • cloNIDine  0.1 mg Oral Q6H PRN Nasrin Herring WINTER Figueredo     • diazepam  5 mg Intravenous Q6H PRN Nasrin Herring WINTER Figueredo     • diphenhydramine, lidocaine, Al/Mg hydroxide, simethicone  10 mL Swish & Swallow Q4H PRN Nasrin Herring WINTER Figueredo     • enoxaparin  40 mg Subcutaneous Daily Matthew Santana PA-C     • gabapentin  300 mg Oral Q8H PRN Nasrintorin Figueredo PA-C     • insulin glargine  25 Units Subcutaneous HS Cayden Dillon MD     • insulin lispro  1-6 Units Subcutaneous TID AC Cayden Dillon MD     • insulin lispro  5 Units Subcutaneous TID With Meals Cayden Dillon MD     • loperamide  2 mg Oral Q4H PRN Nasrintorin Figueredo PA-C     • multivitamin-minerals  1 tablet Oral Daily Nasrintorin Figueredo PA-C     • nicotine  1 patch Transdermal Daily Nasrintorin Figueredo PA-C     • ondansetron  4 mg Oral Q6H PRN Becky Escudero PA-C     • sodium chloride  100 mL/hr Intravenous Continuous Nasrin Figueredo PA-C 100 mL/hr (23 1039)         Vitals:   Temp (24hrs), Av.2 °F (36.2 °C), Min:97 °F (36.1 °C), Max:97.8 °F (36.6 °C)    Temp:  [97 °F (36.1 °C)-97.8 °F (36.6 °C)] (P) 97.8 °F (36.6 °C)  HR:  [78-99] (P) 85  Resp:  [16-18] (P) 16  BP: (109-140)/(47-92) (P) 122/63  SpO2:  [92 %-98 %] (P) 98 %  Body mass index is 31.31 kg/m². Input and Output Summary (last 24 hours): Intake/Output Summary (Last 24 hours) at 2023 1547  Last data filed at 2023 2139  Gross per 24 hour   Intake 900 ml   Output --   Net 900 ml       Physical Exam:   Physical Exam  Vitals and nursing note reviewed.    Constitutional: Comments: Resting comfortably   HENT:      Head: Normocephalic and atraumatic. Nose: Nose normal.      Mouth/Throat:      Mouth: Mucous membranes are moist.   Eyes:      Extraocular Movements: Extraocular movements intact. Conjunctiva/sclera: Conjunctivae normal.      Pupils: Pupils are equal, round, and reactive to light. Cardiovascular:      Rate and Rhythm: Normal rate and regular rhythm. Pulmonary:      Effort: Pulmonary effort is normal.   Abdominal:      General: Abdomen is flat. Bowel sounds are normal.      Palpations: Abdomen is soft. Musculoskeletal:         General: Normal range of motion. Cervical back: Normal range of motion. Skin:     General: Skin is warm and dry. Neurological:      General: No focal deficit present. Mental Status: She is alert and oriented to person, place, and time. Psychiatric:         Attention and Perception: Attention normal.         Mood and Affect: Mood normal.         Speech: Speech normal.         Behavior: Behavior normal. Behavior is cooperative. Cognition and Memory: Cognition normal.         Additional Data:     Labs:   Results from last 7 days   Lab Units 09/17/23  0633 09/16/23  1610 09/15/23  2054   WBC Thousand/uL 9.69   < > 13.99*   HEMOGLOBIN g/dL 14.8   < > 17.6*   HEMATOCRIT % 44.0   < > 51.3*   PLATELETS Thousands/uL 260   < > 392*   NEUTROS PCT %  --   --  85*   LYMPHS PCT %  --   --  11*   MONOS PCT %  --   --  4   EOS PCT %  --   --  0    < > = values in this interval not displayed.       Results from last 7 days   Lab Units 09/17/23  0633 09/16/23  1610   SODIUM mmol/L 130* 127*   POTASSIUM mmol/L 3.7 3.7   CHLORIDE mmol/L 92* 86*   CO2 mmol/L 29 27   BUN mg/dL 18 20   CREATININE mg/dL 0.94 0.96   ANION GAP mmol/L 9 14   CALCIUM mg/dL 8.5 9.2   ALBUMIN g/dL  --  4.2   TOTAL BILIRUBIN mg/dL  --  1.28*   ALK PHOS U/L  --  99   ALT U/L  --  17   AST U/L  --  10*   GLUCOSE RANDOM mg/dL 355* 374*           Results from last 7 days   Lab Units 09/17/23  1105 09/17/23  0751 09/17/23  0643 09/16/23  2134 09/16/23  1609 09/16/23  1141 09/16/23  0628   POC GLUCOSE mg/dl 341* 327* 334* 361* 358* 414* 444*                    * I Have Reviewed All Lab Data Listed Above. * Additional Pertinent Lab Tests Reviewed: [address] [facility] Admission Reviewed      Imaging Studies: I have personally reviewed pertinent reports. Today, Patient Was Seen By: Kristian Dixon DO    ** Please Note: Dictation voice to text software may have been used in the creation of this document.  **

## 2023-09-17 NOTE — ASSESSMENT & PLAN NOTE
Lab Results   Component Value Date    WBC 9.69 09/17/2023        • Elevated WBC on admission  • Possibly 2/2 leukemoid reaction from acute withdrawal and/or hemoconcentration from dehydration  • No evidence of active infection   o Pt afebrile, VSS  • Continue monitoring CBC, VS

## 2023-09-18 VITALS
TEMPERATURE: 97.6 F | HEIGHT: 66 IN | BODY MASS INDEX: 31.18 KG/M2 | RESPIRATION RATE: 18 BRPM | OXYGEN SATURATION: 94 % | HEART RATE: 79 BPM | DIASTOLIC BLOOD PRESSURE: 64 MMHG | SYSTOLIC BLOOD PRESSURE: 114 MMHG | WEIGHT: 194 LBS

## 2023-09-18 PROBLEM — Z79.4 TYPE 2 DIABETES MELLITUS WITH HYPERGLYCEMIA, WITH LONG-TERM CURRENT USE OF INSULIN (HCC): Status: ACTIVE | Noted: 2023-09-18

## 2023-09-18 PROBLEM — N30.00 ACUTE CYSTITIS: Status: ACTIVE | Noted: 2023-09-18

## 2023-09-18 PROBLEM — E11.65 TYPE 2 DIABETES MELLITUS WITH HYPERGLYCEMIA, WITH LONG-TERM CURRENT USE OF INSULIN (HCC): Status: ACTIVE | Noted: 2023-09-18

## 2023-09-18 LAB
ANION GAP SERPL CALCULATED.3IONS-SCNC: 4 MMOL/L
ATRIAL RATE: 0 BPM
ATRIAL RATE: 0 BPM
ATRIAL RATE: 99 BPM
BUN SERPL-MCNC: 15 MG/DL (ref 5–25)
CALCIUM SERPL-MCNC: 7.9 MG/DL (ref 8.4–10.2)
CHLORIDE SERPL-SCNC: 102 MMOL/L (ref 96–108)
CO2 SERPL-SCNC: 27 MMOL/L (ref 21–32)
CREAT SERPL-MCNC: 0.79 MG/DL (ref 0.6–1.3)
EST. AVERAGE GLUCOSE BLD GHB EST-MCNC: 312 MG/DL
GFR SERPL CREATININE-BSD FRML MDRD: 93 ML/MIN/1.73SQ M
GLUCOSE SERPL-MCNC: 264 MG/DL (ref 65–140)
GLUCOSE SERPL-MCNC: 368 MG/DL (ref 65–140)
GLUCOSE SERPL-MCNC: 384 MG/DL (ref 65–140)
HAV IGM SER QL: NORMAL
HBA1C MFR BLD: 12.5 %
HBV CORE IGM SER QL: NORMAL
HBV SURFACE AG SER QL: NORMAL
HCV AB SER QL: NORMAL
P AXIS: 55 DEGREES
POTASSIUM SERPL-SCNC: 4.2 MMOL/L (ref 3.5–5.3)
PR INTERVAL: 122 MS
QRS AXIS: 0 DEGREES
QRS AXIS: 0 DEGREES
QRS AXIS: 23 DEGREES
QRSD INTERVAL: 0 MS
QRSD INTERVAL: 0 MS
QRSD INTERVAL: 80 MS
QT INTERVAL: 0 MS
QT INTERVAL: 0 MS
QT INTERVAL: 364 MS
QTC INTERVAL: 0 MS
QTC INTERVAL: 0 MS
QTC INTERVAL: 467 MS
SODIUM SERPL-SCNC: 133 MMOL/L (ref 135–147)
T WAVE AXIS: 0 DEGREES
T WAVE AXIS: 0 DEGREES
T WAVE AXIS: 46 DEGREES
VENTRICULAR RATE: 0 BPM
VENTRICULAR RATE: 0 BPM
VENTRICULAR RATE: 99 BPM

## 2023-09-18 PROCEDURE — 93010 ELECTROCARDIOGRAM REPORT: CPT | Performed by: INTERNAL MEDICINE

## 2023-09-18 PROCEDURE — 80048 BASIC METABOLIC PNL TOTAL CA: CPT | Performed by: EMERGENCY MEDICINE

## 2023-09-18 PROCEDURE — 99239 HOSP IP/OBS DSCHRG MGMT >30: CPT

## 2023-09-18 PROCEDURE — 82948 REAGENT STRIP/BLOOD GLUCOSE: CPT

## 2023-09-18 RX ORDER — BUPRENORPHINE AND NALOXONE 8; 2 MG/1; MG/1
8 FILM, SOLUBLE BUCCAL; SUBLINGUAL 2 TIMES DAILY
Qty: 60 FILM | Refills: 0 | Status: SHIPPED | OUTPATIENT
Start: 2023-09-18 | End: 2023-09-18

## 2023-09-18 RX ORDER — INSULIN GLARGINE 100 [IU]/ML
25 INJECTION, SOLUTION SUBCUTANEOUS
Qty: 10 ML | Refills: 0 | Status: SHIPPED | OUTPATIENT
Start: 2023-09-18 | End: 2023-09-20

## 2023-09-18 RX ORDER — CEPHALEXIN 500 MG/1
500 CAPSULE ORAL EVERY 8 HOURS SCHEDULED
Qty: 14 CAPSULE | Refills: 0 | Status: SHIPPED | OUTPATIENT
Start: 2023-09-18 | End: 2023-09-23

## 2023-09-18 RX ORDER — CEPHALEXIN 500 MG/1
500 CAPSULE ORAL EVERY 8 HOURS SCHEDULED
Status: DISCONTINUED | OUTPATIENT
Start: 2023-09-18 | End: 2023-09-18 | Stop reason: HOSPADM

## 2023-09-18 RX ORDER — INSULIN LISPRO 100 [IU]/ML
5 INJECTION, SOLUTION INTRAVENOUS; SUBCUTANEOUS
Qty: 10 ML | Refills: 0 | Status: SHIPPED | OUTPATIENT
Start: 2023-09-18 | End: 2023-09-20

## 2023-09-18 RX ADMIN — CEPHALEXIN 500 MG: 500 CAPSULE ORAL at 14:45

## 2023-09-18 RX ADMIN — INSULIN LISPRO 3 UNITS: 100 INJECTION, SOLUTION INTRAVENOUS; SUBCUTANEOUS at 12:04

## 2023-09-18 RX ADMIN — INSULIN LISPRO 6 UNITS: 100 INJECTION, SOLUTION INTRAVENOUS; SUBCUTANEOUS at 07:48

## 2023-09-18 RX ADMIN — METFORMIN HYDROCHLORIDE 500 MG: 500 TABLET ORAL at 07:49

## 2023-09-18 RX ADMIN — Medication 1 TABLET: at 08:00

## 2023-09-18 RX ADMIN — INSULIN LISPRO 5 UNITS: 100 INJECTION, SOLUTION INTRAVENOUS; SUBCUTANEOUS at 12:05

## 2023-09-18 RX ADMIN — NICOTINE 1 PATCH: 14 PATCH, EXTENDED RELEASE TRANSDERMAL at 08:00

## 2023-09-18 RX ADMIN — INSULIN LISPRO 5 UNITS: 100 INJECTION, SOLUTION INTRAVENOUS; SUBCUTANEOUS at 07:49

## 2023-09-18 RX ADMIN — CEPHALEXIN 500 MG: 500 CAPSULE ORAL at 06:12

## 2023-09-18 RX ADMIN — BUPRENORPHINE AND NALOXONE 8 MG: 8; 2 FILM BUCCAL; SUBLINGUAL at 08:00

## 2023-09-18 NOTE — ASSESSMENT & PLAN NOTE
· As e/b +WBC and moderate bacteriuria on UA with reflex   · Also in the setting of uncontrolled DM2, with significant glucosuria  · Transitioned from IV ceftriaxone to PO Keflex 500 mg TID on 9/18/23  · Continue Kelfex upon discharge

## 2023-09-18 NOTE — DISCHARGE INSTR - OTHER ORDERS
CRISIS INFORMATION  Mental Health Matters! Help is available. Please call. Mental Health Crisis Intervention and Suicide Prevention Hot Line: Dial 988  Local Crisis number: [phone] or toll free: [phone] - 24-Hour Crisis & Emergency Services  Telephone Crisis Intervention is available 24 hours a day 7 days a week. Mobile Crisis Intervention is available to be dispatched to the three [facility] during certain times and can be requested at the same numbers. There is also a crisis residence available for those who need that level of care.   [employer]:  097706    Drug and Alcohol Services  For information on how to access Drug and Alcohol treatment services please contact:  After hours 24/7 number:  [facility] at [phone]  During regular business hours call:  [facility]  [address] [facility], [address] [place] [address]  Phone: [phone]    [address] [facility] ([address])  [address] [facility], [address]  Phone: [phone]  [address] [facility], [address] [place] [address] Dr  New Admissions - [phone]  Local Office - [phone]   [place] [address] [address]  Phone [phone]

## 2023-09-18 NOTE — NURSING NOTE
Reviewed discharge instructions with patient. She had no questions. Patient escorted to lobby with pca. Patient had all belongings.

## 2023-09-18 NOTE — DISCHARGE SUMMARY
[facility]  Discharge- Ricky Kirkland 1985, 34 y.o. female MRN: [id number]  Unit/Bed#: 5T DETOX 021-10 Encounter: [id number]  Primary Care Provider: Courtney Newton DO   Date and time admitted to hospital: 9/16/2023  5:05 AM  MEDICAL DETOX UNIT, LEVEL 4  Department of Medical Toxicology  Reason for Admission/Principal Problem: opioid withdrawal, opioid use disorder   Admitting provider: WINTER Yung DO   9/16/2023  5:05 AM       Discharging Physician / Practitioner: Cory Hughes PA-C  PCP: Courtney Newton DO  Admission Date:   Admission Orders (From admission, onward)     Ordered        09/17/23 1618  Inpatient Admission  Once            09/16/23 0526  Place in Observation  Once                      Discharge Date: 09/18/23    Medical Problems     Resolved Problems  Date Reviewed: 9/18/2023          Resolved    * (Principal) Opioid withdrawal ([address]) 9/17/2023     Resolved by  Esther Real, DO    Leukocytosis 9/17/2023     Resolved by  Esther Real, DO    Dehydration 9/17/2023     Resolved by  Joby Preston,     Abnormal LFTs (liver function tests) 9/17/2023     Resolved by  Esther Real, DO          Type 2 diabetes mellitus with hyperglycemia, with long-term current use of insulin [facility])  Assessment & Plan  Lab Results   Component Value Date    HGBA1C 12.5 (H) 09/17/2023       Recent Labs     09/17/23  1627 09/17/23  2123 09/18/23  0617 09/18/23  1133   POCGLU 338* 390* 384* 264*       Blood Sugar Average: Last 72 hrs:  (P) [id number]    Acute cystitis  Assessment & Plan  · As e/b +WBC and moderate bacteriuria on UA with reflex   · Also in the setting of uncontrolled DM2, with significant glucosuria  · Transitioned from IV ceftriaxone to PO Keflex 500 mg TID on 9/18/23  · Continue Kelfex upon discharge     Pseudohyponatremia  Assessment & Plan  Secondary to hyperglycemia  Recheck basic metabolic panel    Polysubstance abuse ([address])  Assessment & Plan  Pt with a h/o polysubstance abuse with illicit opioids and methamphetamine  Last used methamphetamine about 2-3 days ago (9/13-9/14)  Encourage cessation of all substance use     Class 2 obesity with body mass index (BMI) of 35.0 to 35.9 in adult  Assessment & Plan  · Body mass index is 31.31 kg/m². · Last Hgb A1c 6.4% on 5/3/23  · Last lipid panel WNL in 2018 - will repeat lipid panel  · Recommend healthy diet, lifestyle modifications, cessation of all substance use  · Continue diabetic regimen     Hyponatremia  Assessment & Plan  · Stable at 133. HTN (hypertension)  Assessment & Plan  · Most Recent Blood Pressure: 113/68    · Blood pressure is normal after treatment of opioid withdrawal.  We will refrain from restarting antihypertensive medication. · Follow up with PCP upon discharge     Type 2 diabetes mellitus with hyperglycemia [facility])  Assessment & Plan  Lab Results   Component Value Date    HGBA1C 6.4 (H) 05/03/2023       Recent Labs     09/16/23  2134 09/17/23  0643 09/17/23  0751 09/17/23  1105   POCGLU 361* 334* 327* 341*       Blood Sugar Average: Last 72 hrs:  (P) 781.8432123250372073   · Patient with a history of DM2 +significant hyperglycemia on admission CMP with glucose 502  · Additional pertinent labs include:  · CBC: evidence of dehydration with hemoconcentration  · CMP: Na 124 (corrected Na 130), low Cl 82, CO2 25, anion gap 17  · Elevated BHB 2.8  · VBG: pH 7.430, pCO2 38.9, pO2 23.5, HCO3 25.2 - not indicative of acidosis/DKA  · General medicine consulted for additional glycemic control. Appreciate recommendations. We will also restart metformin.  Follow up o/p    Tobacco use  Assessment & Plan  · Daily tobacco user   Offer NRT  Encourage cessation    Opioid use disorder, severe, dependence ([address])  Assessment & Plan  · Patient with a history of chronic IV heroin/fentanyl use  · Uses about 1 bundle daily   · History of prior [address] admission in May 2023 - pt put herself into precipitated w/d with Suboxone PTA, was started on Precedex gtt in the ED which was continued on admission, pt felt improved and declined Suboxone, and was discharged to home after being monitored for 4 hours off the Precedex gtt   · Screen for hepatitis/HIV with h/o IVDU  · Case management consulted for assistance with aftercare resources - pt will be discharged with OP MAT follow-up through the [facility] program. Ben Villatoro provided upon discharge     Dehydration-resolved as of 9/17/2023  Assessment & Plan  · E/b hemoconcentration on CBC with Hgb 17.6, Hct 51.3, WBC 13.99,   · IVF hydration  · Continue monitoring CBC      Leukocytosis-resolved as of 9/17/2023  Assessment & Plan  Lab Results   Component Value Date    WBC 9.69 09/17/2023        • Elevated WBC on admission  • Possibly 2/2 leukemoid reaction from acute withdrawal and/or hemoconcentration from dehydration  • No evidence of active infection   o Pt afebrile, VSS  • Continue monitoring CBC, VS       * Opioid withdrawal (HCC)-resolved as of 9/17/2023  Assessment & Plan  · Currently stable on maintenance Subxoone  · Continuous pulse oximetry monitoring      Consultations During Hospital Stay:  · Case management   · Internal medicine    Procedures Performed:   · Microinduction with Suboxone    Significant Findings / Test Results:   · Dehydration/Leukocytosis - resolved  · Hyperglycemia (with hx of DM2) - improved  · Hyponatremia - improved  · Rapid HIV negative    Incidental Findings:   · None     Test Results Pending at Discharge (will require follow up):    · Acute hepatitis panel and Hgb A1c      Outpatient Tests / Follow Up Requested:  · Recommend f/u with PCP within 1-2 weeks of discharge    Complications:  none    Reason for Admission: opioid withdrawal, opioid use disorder    Hospital Course:     Iban Mcgregor is a 34 y.o. female patient who originally presented to the hospital on 9/16/2023 due to opioid withdrawal.  Patient initially presented to the [facility] ED requesting detoxification from opioids and agreed to initiation of Suboxone. Pt was subsequently admitted to the [address] [place] [address] Detox Unit for medically assisted opioid withdrawal and Suboxone microinduction. She was started on supportive care measures for opioid withdrawal sx and on 9/16/23, patient underwent Suboxone induction with test dose of Subutex 1 mg followed by stabilization on maintenance Suboxone 8 mg BID that evening without complication. During this admission, pt was found to have evidence of dehydration, which improved with IVF hydration, hyperglycemia in the setting of uncontrolled DM2 and hyponatremia with contributing pseudohyponatremia due to hyperglycemia. SLIM was consulted for assistance with mgmt of DM2 and started the patient on a regimen of basal insulin 25 units QHS and mealtime insulin Lispro 5 units TID with meals and SSI. Metformin was also started. Case management was consulted for assistance with aftercare resources, and patient will be discharged home with OP f/u through the [facility] program.      Please see above list of diagnoses and related plan for additional information. Condition at Discharge: stable     Discharge Day Visit / Exam:     Subjective:  Patient seen and examined at bedside. Denies further withdrawal symptoms. Tolerating PO diet and fluids. She was educated about a diabetic diet and importance of adhering to her diabetic regimen. Vitals: Blood Pressure: 114/64 (09/18/23 0711)  Pulse: 79 (09/18/23 0711)  Temperature: 97.6 °F (36.4 °C) (09/18/23 0711)  Temp Source: Temporal (09/18/23 0711)  Respirations: 18 (09/18/23 0711)  Height: 5' 6" (167.6 cm) (09/16/23 0543)  Weight - Scale: 88 kg (194 lb 0.1 oz) (09/16/23 0543)  SpO2: 94 % (09/18/23 0711)  Exam:   Physical Exam  Constitutional:       General: She is not in acute distress.      Appearance: She is not diaphoretic. Eyes:      General: No scleral icterus. Pupils: Pupils are equal, round, and reactive to light. Cardiovascular:      Rate and Rhythm: Normal rate and regular rhythm. Heart sounds: No murmur heard. Pulmonary:      Effort: No respiratory distress. Breath sounds: Normal breath sounds. Abdominal:      General: Bowel sounds are normal. There is no distension. Palpations: Abdomen is soft. Tenderness: There is no abdominal tenderness. Neurological:      Mental Status: She is alert and oriented to person, place, and time. Psychiatric:         Mood and Affect: Mood is anxious. Mood is not depressed. Discussion with Family: I personally did not discuss this case with the patient's family. I reviewed the discharge plan with the patient and answered all questions to the best of my ability. Discharge instructions/Information to patient and family:   See after visit summary for information provided to patient and family. Provisions for Follow-Up Care:  See after visit summary for information related to follow-up care and any pertinent home health orders. Disposition:     Home    For Discharges to [facility] SNF:   · Not Applicable to this Patient - Not Applicable to this Patient    Planned Readmission: NA     Discharge Statement:  I spent 35 minutes discharging the patient. This time was spent on the day of discharge. I had direct contact with the patient on the day of discharge. Greater than 50% of the total time was spent examining patient, answering all patient questions, arranging and discussing plan of care with patient as well as directly providing post-discharge instructions. Additional time then spent on discharge activities. Discharge Medications:  See after visit summary for reconciled discharge medications provided to patient and family.       ** Please Note: This note has been constructed using a voice recognition system **

## 2023-09-18 NOTE — ASSESSMENT & PLAN NOTE
Lab Results   Component Value Date    HGBA1C 12.5 (H) 09/17/2023       Recent Labs     09/17/23  1627 09/17/23  2123 09/18/23  0617 09/18/23  1133   POCGLU 338* 390* 384* 264*       Blood Sugar Average: Last 72 hrs:  (P) 981.4290076617726010

## 2023-09-18 NOTE — PLAN OF CARE
Problem: SAFETY ADULT  Goal: Patient will remain free of falls  Description: INTERVENTIONS:  - Educate patient/family on patient safety including physical limitations  - Instruct patient to call for assistance with activity   - Consult OT/PT to assist with strengthening/mobility   - Keep Call bell within reach  - Keep bed low and locked with side rails adjusted as appropriate  - Keep care items and personal belongings within reach  - Initiate and maintain comfort rounds  - Make Fall Risk Sign visible to staff  - Offer Toileting every 3 Hours, in advance of need  - Obtain necessary fall risk management equipment:   - Apply yellow socks and bracelet for high fall risk patients  - Consider moving patient to room near nurses station  Outcome: Progressing  Goal: Maintain or return to baseline ADL function  Description: INTERVENTIONS:  -  Assess patient's ability to carry out ADLs; assess patient's baseline for ADL function and identify physical deficits which impact ability to perform ADLs (bathing, care of mouth/teeth, toileting, grooming, dressing, etc.)  - Assess/evaluate cause of self-care deficits   - Assess range of motion  - Assess patient's mobility; develop plan if impaired  - Assess patient's need for assistive devices and provide as appropriate  - Encourage maximum independence but intervene and supervise when necessary  - Involve family in performance of ADLs  - Assess for home care needs following discharge   - Consider OT consult to assist with ADL evaluation and planning for discharge  - Provide patient education as appropriate  Outcome: Progressing  Goal: Maintains/Returns to pre admission functional level  Description: INTERVENTIONS:  - Perform BMAT or MOVE assessment daily.   - Set and communicate daily mobility goal to care team and patient/family/caregiver. - Collaborate with rehabilitation services on mobility goals if consulted  - Perform Range of Motion 3 times a day.   - Reposition patient every 3 hours.   - Stand patient times a day  - Ambulate patient times a day  - Out of bed to chair times a day   - Out of bed for meals times a day  - Out of bed for toileting  - Record patient progress and toleration of activity level   Outcome: Progressing

## 2023-09-18 NOTE — PROGRESS NOTES
09/18/23 0951   Referral Data   Referral Reason Drug/Alcohol [address] of Residence [place]   Readmission Root Cause   30 Day Readmission No   Patient Information   Mental Status Alert   Primary Caregiver Self   Support System Immediate family   Restorationist/Cultural Requests None   Activities of Daily Living Prior to Admission   Functional Status Independent   Assistive Device No device needed   Living Arrangement House;Lives with someone  (With parents)   Ambulation Independent   Access to Firearms   Access to Firearms No  (Pt denies)   Income [address] Insurance and [facility] [employer] of Transportation   Means of Transport to Appts: Family transport        09/18/23 1005   Substance Abuse Addendum Details   History of Withdrawal Symptoms Denies past symptoms   Medical Complications Diabetes Type 2, Hypertension   Sober Supports Father, Step-Mom   Present Treatment None   Substance Abuse Treatment Hx Past Tx, Outpatient   Stage of Change   Stage of Change Contemplation     Additional Substance Use Detail    Questions Responses   Problems Due to Past Use of Substances? Yes   Substance Use Assessment Substance use within the past 12 months   Methamphetamine Frequency Experimented   Methamphetamine Method Smoke   Methamphetamine 1st Use 27yo   Methamphetamine Last Use & Amount 9/12 - amount unknown   Methamphetamine Longest Abstinence unknown   Other drug frequency Daily   Comment:  Daily on 9/18/2023    Other drug method IV   Comment: Keisha Nab on 9/18/2023    Other drug last use 9/12/23   Comment:  9/12/2023 on 9/18/2023    Other Substance Abuse Comments (free text) Fentanyl     Pt is a 40yo female who was admitted to withdrawal management unit for fentanyl withdrawal. Pt had presented to [facility] ED requesting medical detox. Pt's name, date of birth, home address and telephone number were verified.  Pt was informed of case management role and the purpose of completion of intake with case management. Pt presented as cooperative. Pt reports she has been using IV Fentanyl daily for over a year. Pt reports using 1 bundle daily intravenously. Pt reports first use at age 25 and last use on 9/12. Pt reports monthly methamphetamine use by smoking. First use at age 25 and last use on 9/12 by smoking. Pt was unable to identify quantity of meth used during each episode. Pt reports longest period of sobriety from fentanyl was 3 years while she was on parole. Pt smokes 1/2 pack of cigarettes daily and denies any interest in quitting. Pt reports previous JORGE treatment through [facility] and states her last attendance was 10 years ago. Pt denies any previous 12 step meetings. Pt denies any current withdrawal symptoms. Pt reports family history of JORGE in her mother. Ethanol in ED: >10  UDS: not completed  Audit: Not completed  PAWSS: 0    Pt denies any mental health diagnosis, any previous treatment and any family history of mental illness. Pt denies any SI/HI/AVH. Pt reports trauma history d/t death of her mother, loss of job, loss of apartment and breakup with boyfriend all at the same time. Pt denies any ongoing symptoms. Pt denies access to firearms. Pt has current chronic medical conditions of hypertension and Diabetes Type 2. Pt signed ELI for Dr. America Moreira. This office was contacted at [phone] and pt was scheduled for [facility] appointment on 9/20 at 1130am. Pt has no insurance at this time. Pt denies any legal issues. Pt reports she is currently unemployed and has no income. Pt reports she attended some college but did not obtain a degree. Pt reports her step-mother provides transportation for her. Pt denies any  service and denies any religous or cultural beliefs. Pt reports she resides at [facility] with her parents. Pt reports she can return to this address and her step-mom will pick her up upon discharge. ELI signed for step-mom, Ailyn Martinez.  SW called pt's step-mom to provide update and advise of discharge plan for today. Step-mom verbalized understanding and agreed to pick pt up between 1-2pm today. Pt declined to complete relapse prevention plan, stating she is aware of her triggers. Pt reports an interest in following up with outpatient treatment. Pt at times minimizing her JORGE. Pt appears ambivalent regarding her substance use, attributing it to her triggers. Pt's barriers to treatment include ambivalence, lack of informal and formal supports. Pt's goals for detox are to successfully complete medical withdrawal and develop a discharge plan that includes relapse prevention education. Pt appears in the pre-contemplation stage of change.

## 2023-09-19 ENCOUNTER — TRANSITIONAL CARE MANAGEMENT (OUTPATIENT)
Dept: FAMILY MEDICINE CLINIC | Facility: CLINIC | Age: 38
End: 2023-09-19

## 2023-09-20 ENCOUNTER — OFFICE VISIT (OUTPATIENT)
Dept: FAMILY MEDICINE CLINIC | Facility: CLINIC | Age: 38
End: 2023-09-20
Payer: COMMERCIAL

## 2023-09-20 ENCOUNTER — PATIENT OUTREACH (OUTPATIENT)
Dept: CASE MANAGEMENT | Facility: OTHER | Age: 38
End: 2023-09-20

## 2023-09-20 VITALS
HEART RATE: 104 BPM | BODY MASS INDEX: 31.58 KG/M2 | OXYGEN SATURATION: 99 % | TEMPERATURE: 98.2 F | WEIGHT: 201.2 LBS | SYSTOLIC BLOOD PRESSURE: 122 MMHG | DIASTOLIC BLOOD PRESSURE: 88 MMHG | HEIGHT: 67 IN

## 2023-09-20 DIAGNOSIS — Z71.89 COMPLEX CARE COORDINATION: Primary | ICD-10-CM

## 2023-09-20 DIAGNOSIS — Z79.4 TYPE 2 DIABETES MELLITUS WITH HYPERGLYCEMIA, WITH LONG-TERM CURRENT USE OF INSULIN (HCC): Primary | ICD-10-CM

## 2023-09-20 DIAGNOSIS — F19.10 POLYSUBSTANCE ABUSE (HCC): ICD-10-CM

## 2023-09-20 DIAGNOSIS — F11.20 OPIOID USE DISORDER, SEVERE, DEPENDENCE (HCC): ICD-10-CM

## 2023-09-20 DIAGNOSIS — E11.65 TYPE 2 DIABETES MELLITUS WITH HYPERGLYCEMIA, WITH LONG-TERM CURRENT USE OF INSULIN (HCC): Primary | ICD-10-CM

## 2023-09-20 DIAGNOSIS — F43.21 ADJUSTMENT DISORDER WITH DEPRESSED MOOD: ICD-10-CM

## 2023-09-20 PROCEDURE — 99214 OFFICE O/P EST MOD 30 MIN: CPT | Performed by: FAMILY MEDICINE

## 2023-09-20 RX ORDER — INSULIN GLARGINE 100 [IU]/ML
30 INJECTION, SOLUTION SUBCUTANEOUS
Qty: 10 ML | Refills: 5 | Status: SHIPPED | OUTPATIENT
Start: 2023-09-20 | End: 2023-09-20 | Stop reason: SDUPTHER

## 2023-09-20 RX ORDER — INSULIN GLARGINE 100 [IU]/ML
30 INJECTION, SOLUTION SUBCUTANEOUS
Qty: 10 ML | Refills: 5 | Status: SHIPPED | OUTPATIENT
Start: 2023-09-20

## 2023-09-20 RX ORDER — INSULIN LISPRO 100 [IU]/ML
INJECTION, SOLUTION INTRAVENOUS; SUBCUTANEOUS
Status: CANCELLED | OUTPATIENT
Start: 2023-09-20

## 2023-09-20 RX ORDER — INSULIN LISPRO 100 [IU]/ML
INJECTION, SOLUTION INTRAVENOUS; SUBCUTANEOUS
Qty: 10 ML | Refills: 5 | Status: SHIPPED | OUTPATIENT
Start: 2023-09-20

## 2023-09-20 NOTE — PROGRESS NOTES
Assessment & Plan     1. Type 2 diabetes mellitus with hyperglycemia, with long-term current use of insulin (HCC)  Assessment & Plan:    Lab Results   Component Value Date    HGBA1C 12.5 (H) 09/17/2023   Insulin dosing adjusted. SS with meals plus increased basal to 30 units at HS. Accucheck QID. Increased metformin to 1000mg BID. Recheck logs 2 weeks, check BMP at that time. Call sooner with hypo/hyperglycemia needing more immediate attention. Orders:  -     metFORMIN (GLUCOPHAGE) 1000 MG tablet; Take 1 tablet (1,000 mg total) by mouth 2 (two) times a day with meals  -     insulin lispro (HumaLOG) 100 units/mL injection; Use TID with meals with following SS: 151-200- 5 u  201-250- 8 u  251-300- 10 u  301-350- 12 u  351-400 15 u  Greater than 401 18 u  -     insulin glargine (LANTUS) 100 units/mL subcutaneous injection; Inject 30 Units under the skin daily at bedtime  -     Insulin syringes    2. Opioid use disorder, severe, dependence ([address])    3. Polysubstance abuse ([address])  Assessment & Plan:  Discussed at length. Starting IOP tomorrow. Given contact info for [facility] and advised she can look online [employer] for therapy options. RTO/recheck in 2 weeks. 4. Adjustment disorder with depressed mood      BMI Counseling: Body mass index is 31.51 kg/m². The BMI is above normal. Nutrition recommendations include decreasing portion sizes, limiting drinks that contain sugar and moderation in carbohydrate intake. Exercise recommendations include exercising 3-5 times per week. Rationale for BMI follow-up plan is due to patient being overweight or obese. Depression Screening and Follow-up Plan: Patient was screened for depression during today's encounter. They screened negative with a PHQ-2 score of 0. Subjective     Transitional Care Management Review:   Ricky Kirkland is a 34 y.o. female here for TCM follow up.      During the TCM phone call patient stated:  TCM Call     Date and time call was made  9/19/2023 10:33 AM    Hospital care reviewed  Other diagnostic studies pending    Patient was hospitialized at  [facility]    Date of Admission  09/16/23    Date of discharge  09/18/23    Diagnosis  Opioid withdrawal    Disposition  Home    Were the patients medications reviewed and updated  Yes      TCM Call     Scheduled for follow up? Yes    Did you obtain your prescribed medications  Yes    Do you need help managing your prescriptions or medications  No    Is transportation to your appointment needed  No    I have advised the patient to call PCP with any new or worsening symptoms  Jazzy Curran RMA        TCM- here for HFU, detox from fentanyl and meth and uncontrolled DMII. States placed on short acting insulin, 5u with meals plus basal insulin at HS, 25u. FBS this a.m. 340. Consistently high reads still, >300. Eating 3 meals a day. Prev presented with uncontrolled DM as an outpatient requiring ISS management. Eventually able to d/c all insulin and just managed on metformin 1000mg BID for years. A1C 12.5. Prev in the 6's. Started using drugs again. Significant grief and guilt related to her mother's OD death approx 2 years ago. Now clean since hospitalization and has IOP intake tomorrow. Wants another therapist or counselor for herself outside of IO program.   Recently issued state medicaid. Review of Systems   Constitutional: Positive for fatigue. Negative for activity change and fever. Cardiovascular: Negative for chest pain and palpitations. Gastrointestinal: Negative for abdominal pain. Psychiatric/Behavioral: Positive for decreased concentration and dysphoric mood. Negative for confusion. The patient is nervous/anxious.         Objective     /88 (BP Location: Left arm, Patient Position: Sitting, Cuff Size: Adult)   Pulse 104   Temp 98.2 °F (36.8 °C) (Tympanic)   Ht 5' 7" (1.702 m)   Wt 91.3 kg (201 lb 3.2 oz)   SpO2 99%   BMI 31.51 kg/m²      Physical Exam  Constitutional:       Appearance: Normal appearance. HENT:      Head: Normocephalic. Cardiovascular:      Rate and Rhythm: Normal rate and regular rhythm. Pulses: Normal pulses. Pulmonary:      Effort: Pulmonary effort is normal.      Breath sounds: Normal breath sounds. Neurological:      General: No focal deficit present. Mental Status: She is alert. Mental status is at baseline. Psychiatric:         Attention and Perception: Attention normal.         Mood and Affect: Affect is tearful. Speech: Speech is not rapid and pressured or delayed. Behavior: Behavior normal. Behavior is not withdrawn.        Medications have been reviewed by provider in current encounter    Marta Castillo DO

## 2023-09-20 NOTE — PROGRESS NOTES
In basket message with patient referral from HRR report. Chart reviewed. Patient was admitted to [facility] Detox unit on 9/16 with opioid withdrawal.She has a history od diabetes,opioid use disorder with severe dependence, HTN, tobacco abuse and polysubstance abuse. She was discharged home on 9/18. She was seen by her PCP today and insulin dosing was adjusted. Hgb A1C is 12. 5. Per note patient is starting IOP tomorrow. I called and a woman answered and took my contact information and said she would have [employer] call me.

## 2023-09-20 NOTE — ASSESSMENT & PLAN NOTE
Discussed at length. Starting IOP tomorrow. Given contact info for [facility] and advised she can look online [employer] for therapy options. RTO/recheck in 2 weeks.

## 2023-09-20 NOTE — ASSESSMENT & PLAN NOTE
Lab Results   Component Value Date    HGBA1C 12.5 (H) 09/17/2023   Insulin dosing adjusted. SS with meals plus increased basal to 30 units at HS. Accucheck QID. Increased metformin to 1000mg BID. Recheck logs 2 weeks, check BMP at that time. Call sooner with hypo/hyperglycemia needing more immediate attention.

## 2023-09-21 ENCOUNTER — PATIENT OUTREACH (OUTPATIENT)
Dept: CASE MANAGEMENT | Facility: OTHER | Age: 38
End: 2023-09-21

## 2023-09-21 NOTE — PROGRESS NOTES
Second outreach attempt. I called patient and left a message on her voicemail with my contact information.

## 2023-09-25 ENCOUNTER — PATIENT OUTREACH (OUTPATIENT)
Dept: CASE MANAGEMENT | Facility: OTHER | Age: 38
End: 2023-09-25

## 2023-09-25 NOTE — LETTER
Date: 09/25/23    Dear Kenny Franks,   My name is Jethro De León; I am a registered nurse care manager working with [address] [place] [address]. I have not been able to reach you and would like to set a time that I can talk with you over the phone. My work is to help patients that have complex medical conditions get the care they need. This includes patients who may have been in the hospital or emergency room. Please call me with any questions you may have. I look forward to meeting with you.   Sincerely,  Fei Orellana RN  [phone]  Outpatient Care Manager

## 2023-10-04 ENCOUNTER — OFFICE VISIT (OUTPATIENT)
Dept: FAMILY MEDICINE CLINIC | Facility: CLINIC | Age: 38
End: 2023-10-04
Payer: COMMERCIAL

## 2023-10-04 VITALS
HEIGHT: 67 IN | DIASTOLIC BLOOD PRESSURE: 82 MMHG | TEMPERATURE: 97.6 F | SYSTOLIC BLOOD PRESSURE: 128 MMHG | OXYGEN SATURATION: 99 % | HEART RATE: 98 BPM | BODY MASS INDEX: 31.55 KG/M2 | WEIGHT: 201 LBS

## 2023-10-04 DIAGNOSIS — E11.65 TYPE 2 DIABETES MELLITUS WITH HYPERGLYCEMIA, WITH LONG-TERM CURRENT USE OF INSULIN (HCC): ICD-10-CM

## 2023-10-04 DIAGNOSIS — F19.10 POLYSUBSTANCE ABUSE (HCC): Primary | ICD-10-CM

## 2023-10-04 DIAGNOSIS — Z79.4 TYPE 2 DIABETES MELLITUS WITH HYPERGLYCEMIA, WITH LONG-TERM CURRENT USE OF INSULIN (HCC): ICD-10-CM

## 2023-10-04 PROCEDURE — 99214 OFFICE O/P EST MOD 30 MIN: CPT | Performed by: FAMILY MEDICINE

## 2023-10-04 RX ORDER — INSULIN GLARGINE 100 [IU]/ML
35 INJECTION, SOLUTION SUBCUTANEOUS
Qty: 10 ML | Refills: 5 | Status: SHIPPED | OUTPATIENT
Start: 2023-10-04

## 2023-10-04 NOTE — ASSESSMENT & PLAN NOTE
Lab Results   Component Value Date    HGBA1C 12.5 (H) 09/17/2023   Will keep sliding scale the same with meals. Increase basal insulin to 35 units at bedtime. Check BMP today, if renal function stable, consider increasing metformin to 1000 mg twice daily. Recheck all 1 month. Declines flu vaccine today, declines mammography.

## 2023-10-04 NOTE — PROGRESS NOTES
Name: Rohini Major      : 1985      MRN: [id number]  Encounter Provider: Viky Salazar DO  Encounter Date: 10/4/2023   Encounter department: [address] PRIMARY CARE    Assessment & Plan     1. Polysubstance abuse [facility])  Assessment & Plan:  Can look into an independent counselor or therapist once her insurance is in place. She cannot afford it otherwise right now. Is seeing multiple people through a drug and alcohol program locally. 2. Type 2 diabetes mellitus with hyperglycemia, with long-term current use of insulin (HCC)  -     insulin glargine (LANTUS) 100 units/mL subcutaneous injection; Inject 35 Units under the skin daily at bedtime  -     Glucometer  -     Lancets  -     Glucometer test strips  -     Basic metabolic panel; Future           Subjective      DMII-patient currently using 30 units basal insulin at bedtime, plus metformin 1000 mg in the morning and 500 mg with dinner. Fasting blood sugars have come down since her hospital admission, now running around 201st thing in the morning. Her A1c level during her hospital admission was 12.5. Her goal is to come off insulin altogether. Using sliding scale with meals. Apically skips breakfast so no insulin given, using between 6 and 8 units with lunch and dinner. Needs prescription for her own glucometer and supplies. Drug addiction-continues IOP program.  Had an intake appointment through drug and alcohol, her counselor's name is Patsy Phan, her next appointment is next week. Remains on Suboxone. Patient denies drug use. Review of Systems   Constitutional: Negative for chills and fever. Respiratory: Negative for cough and shortness of breath. Cardiovascular: Negative for chest pain and palpitations. Gastrointestinal: Negative for abdominal pain and vomiting. Genitourinary: Negative for dysuria and hematuria. Musculoskeletal: Negative for arthralgias and back pain.    Skin: Negative for color change and rash. Neurological: Negative for seizures and syncope. Psychiatric/Behavioral: Positive for decreased concentration. All other systems reviewed and are negative. Current Outpatient Medications on File Prior to Visit   Medication Sig   • insulin lispro (HumaLOG) 100 units/mL injection Use TID with meals with following SS: 151-200- 5 u  201-250- 8 u  251-300- 10 u  301-350- 12 u  351-400 15 u  Greater than 401 18 u   • metFORMIN (GLUCOPHAGE) 1000 MG tablet Take 1 tablet (1,000 mg total) by mouth 2 (two) times a day with meals   • [DISCONTINUED] insulin glargine (LANTUS) 100 units/mL subcutaneous injection Inject 30 Units under the skin daily at bedtime   • Buprenorphine HCl-Naloxone HCl 11.4-2.9 MG SUBL Place 1 tablet under the tongue daily for 15 days       Objective     /82 (BP Location: Left arm, Patient Position: Sitting, Cuff Size: Standard)   Pulse 98   Temp 97.6 °F (36.4 °C) (Tympanic)   Ht 5' 7" (1.702 m)   Wt 91.2 kg (201 lb)   SpO2 99%   BMI 31.48 kg/m²     Physical Exam  Vitals and nursing note reviewed. Constitutional:       General: She is not in acute distress. Appearance: Normal appearance. HENT:      Head: Normocephalic and atraumatic. Cardiovascular:      Rate and Rhythm: Normal rate and regular rhythm. Pulses: Normal pulses. Heart sounds: No murmur heard. Pulmonary:      Effort: Pulmonary effort is normal.      Breath sounds: Normal breath sounds. No wheezing, rhonchi or rales. Musculoskeletal:      Cervical back: Normal range of motion and neck supple. No tenderness. Lymphadenopathy:      Cervical: No cervical adenopathy. Neurological:      General: No focal deficit present. Mental Status: She is alert and oriented to person, place, and time. Psychiatric:         Mood and Affect: Mood normal.         Behavior: Behavior normal.         Thought Content:  Thought content normal.         Judgment: Judgment normal.       Glenna Peterson Ladan Rouse, DO

## 2023-10-04 NOTE — ASSESSMENT & PLAN NOTE
Can look into an independent counselor or therapist once her insurance is in place. She cannot afford it otherwise right now. Is seeing multiple people through a drug and alcohol program locally.

## 2023-10-09 ENCOUNTER — PATIENT OUTREACH (OUTPATIENT)
Dept: CASE MANAGEMENT | Facility: OTHER | Age: 38
End: 2023-10-09

## 2023-11-13 ENCOUNTER — OFFICE VISIT (OUTPATIENT)
Dept: FAMILY MEDICINE CLINIC | Facility: CLINIC | Age: 38
End: 2023-11-13
Payer: COMMERCIAL

## 2023-11-13 VITALS
HEART RATE: 85 BPM | DIASTOLIC BLOOD PRESSURE: 80 MMHG | SYSTOLIC BLOOD PRESSURE: 128 MMHG | WEIGHT: 190 LBS | BODY MASS INDEX: 29.82 KG/M2 | HEIGHT: 67 IN | OXYGEN SATURATION: 99 % | TEMPERATURE: 96.5 F

## 2023-11-13 DIAGNOSIS — Z79.4 TYPE 2 DIABETES MELLITUS WITH HYPERGLYCEMIA, WITH LONG-TERM CURRENT USE OF INSULIN (HCC): Primary | ICD-10-CM

## 2023-11-13 DIAGNOSIS — E11.65 TYPE 2 DIABETES MELLITUS WITH HYPERGLYCEMIA, WITH LONG-TERM CURRENT USE OF INSULIN (HCC): Primary | ICD-10-CM

## 2023-11-13 DIAGNOSIS — E11.65 TYPE 2 DIABETES MELLITUS WITH HYPERGLYCEMIA, UNSPECIFIED WHETHER LONG TERM INSULIN USE (HCC): Primary | ICD-10-CM

## 2023-11-13 PROCEDURE — 99213 OFFICE O/P EST LOW 20 MIN: CPT | Performed by: FAMILY MEDICINE

## 2023-11-13 NOTE — ASSESSMENT & PLAN NOTE
Lab Results   Component Value Date    HGBA1C 12.5 (H) 09/17/2023   Instructions given, patient will increase her Lantus by 2 units nightly until fasting blood sugar in the morning is 150 or less. Then instructed to stay at that particular number of units. Continue to use insulin sliding scale with meals. Attempting to get glucometer and supplies covered by her Medicaid insurance. Recheck here in 1 month, will be due for A1c at that time. Goal is to come off of all insulin products and transition to oral medications. Management likely to be driven by cost of medications.

## 2023-11-13 NOTE — PROGRESS NOTES
Name: Marina Bolaños      : 1985      MRN: [id number]  Encounter Provider: Jose E Pereyra DO  Encounter Date: 2023   [facility]    Assessment & Plan     1. Type 2 diabetes mellitus with hyperglycemia, with long-term current use of insulin ([facility])  Assessment & Plan:    Lab Results   Component Value Date    HGBA1C 12.5 (H) 2023   Instructions given, patient will increase her Lantus by 2 units nightly until fasting blood sugar in the morning is 150 or less. Then instructed to stay at that particular number of units. Continue to use insulin sliding scale with meals. Attempting to get glucometer and supplies covered by her Medicaid insurance. Recheck here in 1 month, will be due for A1c at that time. Goal is to come off of all insulin products and transition to oral medications. Management likely to be driven by cost of medications. Orders:  -     Glucometer  -     Glucometer test strips  -     Lancets           Subjective      Recheck DM-    patient reports that sugars are slowly decreasing. Averaging 200 FBS in the a.m. Using around 12 units with meals. Typically skips breakfast.  Using 35 units basal insulin at bedtime plus a sliding scale with meals. Continues to see addiction medicine, they prescribe her Suboxone. Review of Systems   Constitutional:  Negative for activity change, appetite change and fever. HENT:  Negative for trouble swallowing. Respiratory:  Negative for apnea, cough, chest tightness and shortness of breath. Cardiovascular:  Negative for chest pain, palpitations and leg swelling. Gastrointestinal:  Negative for abdominal pain. Musculoskeletal:  Negative for back pain and gait problem. Neurological:  Negative for dizziness and light-headedness.        Current Outpatient Medications on File Prior to Visit   Medication Sig   • insulin glargine (LANTUS) 100 units/mL subcutaneous injection Inject 35 Units under the skin daily at bedtime   • insulin lispro (HumaLOG) 100 units/mL injection Use TID with meals with following SS: 151-200- 5 u  201-250- 8 u  251-300- 10 u  301-350- 12 u  351-400 15 u  Greater than 401 18 u   • metFORMIN (GLUCOPHAGE) 1000 MG tablet Take 1 tablet (1,000 mg total) by mouth 2 (two) times a day with meals   • Buprenorphine HCl-Naloxone HCl 11.4-2.9 MG SUBL Place 1 tablet under the tongue daily for 15 days       Objective     /80 (BP Location: Left arm, Patient Position: Sitting, Cuff Size: Large)   Pulse 85   Temp (!) 96.5 °F (35.8 °C) (Tympanic)   Ht 5' 7" (1.702 m)   Wt 86.2 kg (190 lb)   SpO2 99%   BMI 29.76 kg/m²     Physical Exam  Vitals and nursing note reviewed. Constitutional:       General: She is not in acute distress. Appearance: Normal appearance. HENT:      Head: Normocephalic and atraumatic. Cardiovascular:      Rate and Rhythm: Normal rate and regular rhythm. Pulses: Normal pulses. Heart sounds: No murmur heard. Pulmonary:      Effort: Pulmonary effort is normal.      Breath sounds: Normal breath sounds. No wheezing, rhonchi or rales. Musculoskeletal:      Cervical back: Normal range of motion and neck supple. No tenderness. Lymphadenopathy:      Cervical: No cervical adenopathy. Neurological:      General: No focal deficit present. Mental Status: She is alert and oriented to person, place, and time. Psychiatric:         Mood and Affect: Mood normal.         Behavior: Behavior normal.         Thought Content:  Thought content normal.         Judgment: Judgment normal.       Harshal Costello DO

## 2023-11-17 PROBLEM — N30.00 ACUTE CYSTITIS: Status: RESOLVED | Noted: 2023-09-18 | Resolved: 2023-11-17

## 2023-12-07 ENCOUNTER — OFFICE VISIT (OUTPATIENT)
Dept: INTERNAL MEDICINE CLINIC | Facility: CLINIC | Age: 38
End: 2023-12-07
Payer: COMMERCIAL

## 2023-12-07 VITALS
BODY MASS INDEX: 30.49 KG/M2 | WEIGHT: 194.25 LBS | TEMPERATURE: 97.6 F | SYSTOLIC BLOOD PRESSURE: 150 MMHG | HEIGHT: 67 IN | HEART RATE: 97 BPM | DIASTOLIC BLOOD PRESSURE: 90 MMHG | OXYGEN SATURATION: 98 %

## 2023-12-07 DIAGNOSIS — Z51.81 ENCOUNTER FOR MONITORING SUBOXONE MAINTENANCE THERAPY: ICD-10-CM

## 2023-12-07 DIAGNOSIS — Z79.899 MEDICATION THERAPY CONTINUED: ICD-10-CM

## 2023-12-07 DIAGNOSIS — Z79.899 ENCOUNTER FOR MONITORING SUBOXONE MAINTENANCE THERAPY: ICD-10-CM

## 2023-12-07 DIAGNOSIS — F19.20 DRUG DEPENDENCY (HCC): ICD-10-CM

## 2023-12-07 DIAGNOSIS — E28.39 HYPOGONADISM, OVARIAN: ICD-10-CM

## 2023-12-07 DIAGNOSIS — F19.10 IV DRUG ABUSE (HCC): ICD-10-CM

## 2023-12-07 DIAGNOSIS — Z79.899 OTHER LONG TERM (CURRENT) DRUG THERAPY: Primary | ICD-10-CM

## 2023-12-07 PROBLEM — Z79.891 ENCOUNTER FOR MONITORING SUBOXONE MAINTENANCE THERAPY: Status: ACTIVE | Noted: 2023-12-07

## 2023-12-07 PROCEDURE — 99243 OFF/OP CNSLTJ NEW/EST LOW 30: CPT | Performed by: INTERNAL MEDICINE

## 2023-12-07 RX ORDER — NALOXONE HYDROCHLORIDE 4 MG/.1ML
1 SPRAY NASAL AS NEEDED
Qty: 1 EACH | Refills: 1 | Status: SHIPPED | OUTPATIENT
Start: 2023-12-07

## 2023-12-07 RX ORDER — BUPRENORPHINE AND NALOXONE 8; 2 MG/1; MG/1
FILM, SOLUBLE BUCCAL; SUBLINGUAL
Qty: 14 FILM | Refills: 0 | Status: SHIPPED | OUTPATIENT
Start: 2023-12-07

## 2023-12-07 NOTE — PATIENT INSTRUCTIONS
Buprenorphine/Naloxone (Into the mouth)   Buprenorphine (bue-pre-NOR-feen), Naloxone (nal-OX-one)  Treats narcotic dependence. Brand Name(s): Suboxone, Zubsolv   There may be other brand names for this medicine. When This Medicine Should Not Be Used: This medicine is not right for everyone. Do not use it if you had an allergic reaction to buprenorphine or naloxone. How to Use This Medicine: Thin Sheet, Tablet  Take your medicine as directed. Your dose may need to be changed several times to find what works best for you. You must let the medicine dissolve. Never swallow the film or tablet. Your body may not absorb enough of the medicine if you swallow it. Your health caregiver should show you how to use the medicine. If you do not understand, ask for help. It is important to use the medicine correctly. Do not talk while the medicine is inside your mouth. Buccal film: Rinse your mouth with water to moisten it. Place the film against the inside of your cheek. If your doctor told you to use more than 1 film, place the second film inside your other cheek. Do not place more than 2 films inside of 1 cheek at a time. Do not move or touch the film. Do not eat or drink anything until the film is completely dissolved. Sublingual tablet: Place the tablet under your tongue. If your doctor told you to use more than 1 tablet, place all of the tablets in different places under your tongue at the same time. You can use 2 tablets at a time until you have taken all of the medicine, if that is easier for you. Let the tablets dissolve completely in your mouth. Do not eat or drink anything until the tablets are completely dissolved. Rinse your mouth with water and swallow. Wait for at least one hour before brushing your teeth. Sublingual film: Drink some water to help moisten your mouth. Place the film under your tongue.  If your doctor told you to use more than 1 film, place the second film on the opposite side from the first one. Do not move the film after you placed it under your tongue. If you are supposed to use more than 2 films, use them the same way, but do not start until the first 2 films are completely dissolved. Do not eat or drink anything until the film is completely dissolved. Do not break, crush, chew, or cut the film or tablet. This medicine should come with a Medication Guide. Ask your pharmacist for a copy if you do not have one. Missed dose: Take a dose as soon as you remember. If it is almost time for your next dose, wait until then and take a regular dose. Do not take extra medicine to make up for a missed dose. Store the medicine in a closed container at room temperature, away from heat, moisture, and direct light. Drop off any unused narcotic medicine at a drug take-back location right away. If you do not have a drug take-back location near you, flush any unused narcotic medicine down the toilet. Check your local drug store and clinics for take-back locations. You can also check the [employer] web site for locations. Here is the link to the FDA safe disposal of medicines [employer]ee  Drugs and Foods to Avoid:   Ask your doctor or pharmacist before using any other medicine, including over-the-counter medicines, vitamins, and herbal products. Do not use this medicine if you are using or have used an MAO inhibitor within the past 14 days. Some medicines can affect how buprenorphine/naloxone works.  Tell your doctor if you are using the following:   Carbamazepine, cyclobenzaprine, erythromycin, ketoconazole, metaxalone, mirtazapine, phenobarbital, phenytoin, rifampin, tramadol, trazodone  Diuretic (water pill)  Medicine to treat depression or mental health problems (including SNRIs, SSRIs, TCAs)  Medicine to treat HIV/AIDS (including atazanavir, delavirdine, efavirenz, etravirine, nevirapine, ritonavir)  Phenothiazine medicine  Triptan medicine to treat migraine headaches  Do not drink alcohol while you are using this medicine. Tell your doctor if you use anything else that makes you sleepy. Some examples are allergy medicine, narcotic pain medicine, and alcohol. Tell your doctor if you are also using butorphanol, nalbuphine, pentazocine, or a muscle relaxer. Warnings While Using This Medicine:   Tell your doctor if you are pregnant or breastfeeding, or if you have kidney disease, liver disease (including hepatitis), lung or breathing problems (including sleep apnea), tooth problems (including history of cavities), adrenal gland problems, an enlarged prostate, trouble urinating, gallbladder problems, thyroid problems, stomach problems, or a history of depression. Tell your doctor if you have heart disease, congestive heart failure, a slow heartbeat, or a history of heart rhythm problems (including long QT syndrome). Tell your doctor if you have a brain tumor, head injury, or alcohol or drug abuse. This medicine may cause the following problems:  High risk of overdose, which can lead to death  Respiratory depression (serious breathing problem that can be life-threatening)  Adrenal gland problems  Sleep-related breathing problems (including sleep apnea, sleep-related hypoxemia)  Low blood pressure  Liver problems  QT prolongation (heart rhythm problem)  Tooth problems (including tooth fracture, tooth loss)  Serotonin syndrome, when used with certain medicines  This medicine may make you dizzy or drowsy. Do not drive or do anything else that could be dangerous until you know how this medicine affects you. Sit or lie down if you feel dizzy. Stand up carefully. Tell any doctor or dentist who treats you that you are using this medicine. This medicine can be habit-forming. Do not use more than your prescribed dose. Call your doctor if you think your medicine is not working.   This medicine may cause constipation, especially with long-term use. Ask your doctor if you should use a laxative to prevent and treat constipation. Do not stop using this medicine suddenly. Your doctor will need to slowly decrease your dose before you stop it completely. This medicine could cause infertility. Talk with your doctor before using this medicine if you plan to have children. Your doctor will do lab tests at regular visits to check on the effects of this medicine. Keep all appointments. Keep all medicine out of the reach of children. Never share your medicine with anyone. Possible Side Effects While Using This Medicine:   Call your doctor right away if you notice any of these side effects: Allergic reaction: Itching or hives, swelling in your face or hands, swelling or tingling in your mouth or throat, chest tightness, trouble breathing  Anxiety, restlessness, fast heartbeat, fever, muscle spasms, twitching, diarrhea, seeing or hearing things that are not there  Blue lips, fingernails, or skin, trouble breathing  Changes in skin color, dark freckles, cold feeling, tiredness, weight loss  Dark urine or pale stools, nausea, vomiting, loss of appetite, stomach pain, yellow skin or eyes  Extreme dizziness, drowsiness, or weakness, slow heartbeat, sweating, seizures, cold or clammy skin  Fast, pounding, or uneven heartbeat  Severe confusion, lightheadedness, dizziness, or fainting  Trouble breathing or slow breathing  Toothache  If you notice these less serious side effects, talk with your doctor:   Constipation, diarrhea, nausea, vomiting, stomach upset  Headache  Stuffy or runny nose  If you notice other side effects that you think are caused by this medicine, tell your doctor. Call your doctor for medical advice about side effects.  You may report side effects to FDA at 7-754-FDA-5070  © Copyright Janelle Willis 2023 Information is for End User's use only and may not be sold, redistributed or otherwise used for commercial purposes. The above information is an  only. It is not intended as medical advice for individual conditions or treatments. Talk to your doctor, nurse or pharmacist before following any medical regimen to see if it is safe and effective for you.

## 2023-12-07 NOTE — PROGRESS NOTES
Assessment/Plan:  Problem List Items Addressed This Visit        Endocrine    Hypogonadism, ovarian       Other    Other long term (current) drug therapy - Primary    Relevant Medications    buprenorphine-naloxone (Suboxone) 8-2 mg    naloxone (Narcan) 4 mg/0.1 mL nasal spray    Other Relevant Orders    [facility] All Prescribed Meds and Special Instructions    Amphetamines, Methamphetamines    Butalbital    Phenobarbital    Secobarbital    Alprazolam    Clonazepam    Diazepam, Temazepam, Oxazepam    Lorazepam    Gabapentin    Pregabalin    Cocaine    Heroin    Buprenorphine    Levorphanol    Meperidine    Naltrexone    Fentanyl    Methadone    Oxycodone    Tapentadol    THC    Tramadol    Codeine, Hydrocodone, Hydropmorphone, Morphine    Bath Salts    Ethyl Glucuronide/Ethyl Sulfate    Kratom    Spice    Methylphenidate    Phentermine    Validity Oxidant    Validity Creatinine    Validity pH    Validity Specific    [facility] All Prescribed Meds and Special Instructions    Amphetamines, Methamphetamines    Butalbital    Phenobarbital    Secobarbital    Alprazolam    Clonazepam    Diazepam    Lorazepam    Gabapentin    Pregabalin    Cocaine    Heroin    Buprenorphine    Levorphanol    Meperidine    Naltrexone    Fentanyl    Methadone    Oxycodone    Tapentadol    THC    Tramadol    Codeine, Hydrocodone, Hydropmorphone, Morphine    Bath Salts    Ethyl Glucuronide/Ethyl Sulfate    Kratom    Spice    Methylphenidate    Phentermine    Validity Oxidant    Validity Creatinine    Validity pH    Validity Specific    Medication therapy continued    Relevant Medications    buprenorphine-naloxone (Suboxone) 8-2 mg    naloxone (Narcan) 4 mg/0.1 mL nasal spray    Other Relevant Orders    [facility] Prescribed Meds and Special Instructions    Amphetamines, Methamphetamines    Butalbital    Phenobarbital    Secobarbital    Alprazolam    Clonazepam    Diazepam, Temazepam, Oxazepam    Lorazepam    Gabapentin    Pregabalin Cocaine    Heroin    Buprenorphine    Levorphanol    Meperidine    Naltrexone    Fentanyl    Methadone    Oxycodone    Tapentadol    THC    Tramadol    Codeine, Hydrocodone, Hydropmorphone, Morphine    Bath Salts    Ethyl Glucuronide/Ethyl Sulfate    Kratom    Spice    Methylphenidate    Phentermine    Validity Oxidant    Validity Creatinine    Validity pH    Validity Specific    [facility] Prescribed Meds and Special Instructions    Amphetamines, Methamphetamines    Butalbital    Phenobarbital    Secobarbital    Alprazolam    Clonazepam    Diazepam    Lorazepam    Gabapentin    Pregabalin    Cocaine    Heroin    Buprenorphine    Levorphanol    Meperidine    Naltrexone    Fentanyl    Methadone    Oxycodone    Tapentadol    THC    Tramadol    Codeine, Hydrocodone, Hydropmorphone, Morphine    Bath Salts    Ethyl Glucuronide/Ethyl Sulfate    Kratom    Spice    Methylphenidate    Phentermine    Validity Oxidant    Validity Creatinine    Validity pH    Validity Specific    IV drug abuse (HCC)    Relevant Medications    buprenorphine-naloxone (Suboxone) 8-2 mg    naloxone (Narcan) 4 mg/0.1 mL nasal spray    Other Relevant Orders    [facility] Prescribed Meds and Special Instructions    Amphetamines, Methamphetamines    Butalbital    Phenobarbital    Secobarbital    Alprazolam    Clonazepam    Diazepam, Temazepam, Oxazepam    Lorazepam    Gabapentin    Pregabalin    Cocaine    Heroin    Buprenorphine    Levorphanol    Meperidine    Naltrexone    Fentanyl    Methadone    Oxycodone    Tapentadol    THC    Tramadol    Codeine, Hydrocodone, Hydropmorphone, Morphine    Bath Salts    Ethyl Glucuronide/Ethyl Sulfate    Kratom    Spice    Methylphenidate    Phentermine    Validity Oxidant    Validity Creatinine    Validity pH    Validity Specific    [facility] Prescribed Meds and Special Instructions    Amphetamines, Methamphetamines    Butalbital    Phenobarbital    Secobarbital    Alprazolam    Clonazepam    Diazepam Lorazepam    Gabapentin    Pregabalin    Cocaine    Heroin    Buprenorphine    Levorphanol    Meperidine    Naltrexone    Fentanyl    Methadone    Oxycodone    Tapentadol    THC    Tramadol    Codeine, Hydrocodone, Hydropmorphone, Morphine    Bath Salts    Ethyl Glucuronide/Ethyl Sulfate    Kratom    Spice    Methylphenidate    Phentermine    Validity Oxidant    Validity Creatinine    Validity pH    Validity Specific    Encounter for monitoring Suboxone maintenance therapy    Relevant Medications    buprenorphine-naloxone (Suboxone) 8-2 mg    naloxone (Narcan) 4 mg/0.1 mL nasal spray    Other Relevant Orders    [facility] Prescribed Meds and Special Instructions    Amphetamines, Methamphetamines    Butalbital    Phenobarbital    Secobarbital    Alprazolam    Clonazepam    Diazepam, Temazepam, Oxazepam    Lorazepam    Gabapentin    Pregabalin    Cocaine    Heroin    Buprenorphine    Levorphanol    Meperidine    Naltrexone    Fentanyl    Methadone    Oxycodone    Tapentadol    THC    Tramadol    Codeine, Hydrocodone, Hydropmorphone, Morphine    Bath Salts    Ethyl Glucuronide/Ethyl Sulfate    Kratom    Spice    Methylphenidate    Phentermine    Validity Oxidant    Validity Creatinine    Validity pH    Validity Specific    [facility] All Prescribed Meds and Special Instructions    Amphetamines, Methamphetamines    Butalbital    Phenobarbital    Secobarbital    Alprazolam    Clonazepam    Diazepam    Lorazepam    Gabapentin    Pregabalin    Cocaine    Heroin    Buprenorphine    Levorphanol    Meperidine    Naltrexone    Fentanyl    Methadone    Oxycodone    Tapentadol    THC    Tramadol    Codeine, Hydrocodone, Hydropmorphone, Morphine    Bath Salts    Ethyl Glucuronide/Ethyl Sulfate    Kratom    Spice    Methylphenidate    Phentermine    Validity Oxidant    Validity Creatinine    Validity pH    Validity Specific    Drug dependency (HCC)    Relevant Medications    buprenorphine-naloxone (Suboxone) 8-2 mg naloxone (Narcan) 4 mg/0.1 mL nasal spray    Other Relevant Orders    [facility] All Prescribed Meds and Special Instructions    Amphetamines, Methamphetamines    Butalbital    Phenobarbital    Secobarbital    Alprazolam    Clonazepam    Diazepam, Temazepam, Oxazepam    Lorazepam    Gabapentin    Pregabalin    Cocaine    Heroin    Buprenorphine    Levorphanol    Meperidine    Naltrexone    Fentanyl    Methadone    Oxycodone    Tapentadol    THC    Tramadol    Codeine, Hydrocodone, Hydropmorphone, Morphine    Bath Salts    Ethyl Glucuronide/Ethyl Sulfate    Kratom    Spice    Methylphenidate    Phentermine    Validity Oxidant    Validity Creatinine    Validity pH    Validity Specific    [facility] All Prescribed Med[facility] and Special Instructions    Amphetamines, Methamphetamines    Butalbital    Phenobarbital    Secobarbital    Alprazolam    Clonazepam    Diazepam    Lorazepam    Gabapentin    Pregabalin    Cocaine    Heroin    Buprenorphine    Levorphanol    Meperidine    Naltrexone    Fentanyl    Methadone    Oxycodone    Tapentadol    THC    Tramadol    Codeine, Hydrocodone, Hydropmorphone, Morphine    Bath Salts    Ethyl Glucuronide/Ethyl Sulfate    Kratom    Spice    Methylphenidate    Phentermine    Validity Oxidant    Validity Creatinine    Validity pH    Validity Specific        Diagnoses and all orders for this visit:    Other long term (current) drug therapy  -     [facility] All Prescribed Meds and Special Instructions  -     Amphetamines, Methamphetamines  -     Butalbital  -     Phenobarbital  -     Secobarbital  -     Alprazolam  -     Clonazepam  -     Diazepam, Temazepam, Oxazepam  -     Lorazepam  -     Gabapentin  -     Pregabalin  -     Cocaine  -     Heroin  -     Buprenorphine  -     Levorphanol  -     Meperidine  -     Naltrexone  -     Fentanyl  -     Methadone  -     Oxycodone  -     Tapentadol  -     THC  -     Tramadol  -     Codeine, Hydrocodone, Hydropmorphone, Morphine  -     Bath Salts  - Ethyl Glucuronide/Ethyl Sulfate  -     Kratom  -     Spice  -     Methylphenidate  -     Phentermine  -     Validity Oxidant  -     Validity Creatinine  -     Validity pH  -     Validity Specific  -     buprenorphine-naloxone (Suboxone) 8-2 mg; One or two strips sl q day.   -     naloxone (Narcan) 4 mg/0.1 mL nasal spray; 0.1 mL (4 mg total) into each nostril as needed (respiratory depression or possible OD)  -     [facility] All Prescribed Meds and Special Instructions  -     Amphetamines, Methamphetamines  -     Butalbital  -     Phenobarbital  -     Secobarbital  -     Alprazolam  -     Clonazepam  -     Diazepam  -     Lorazepam  -     Gabapentin  -     Pregabalin  -     Cocaine  -     Heroin  -     Buprenorphine  -     Levorphanol  -     Meperidine  -     Naltrexone  -     Fentanyl  -     Methadone  -     Oxycodone  -     Tapentadol  -     THC  -     Tramadol  -     Codeine, Hydrocodone, Hydropmorphone, Morphine  -     Bath Salts  -     Ethyl Glucuronide/Ethyl Sulfate  -     Kratom  -     Spice  -     Methylphenidate  -     Phentermine  -     Validity Oxidant  -     Validity Creatinine  -     Validity pH  -     Validity Specific    Drug dependency (HCC)  -     [facility] All Prescribed Meds and Special Instructions  -     Amphetamines, Methamphetamines  -     Butalbital  -     Phenobarbital  -     Secobarbital  -     Alprazolam  -     Clonazepam  -     Diazepam, Temazepam, Oxazepam  -     Lorazepam  -     Gabapentin  -     Pregabalin  -     Cocaine  -     Heroin  -     Buprenorphine  -     Levorphanol  -     Meperidine  -     Naltrexone  -     Fentanyl  -     Methadone  -     Oxycodone  -     Tapentadol  -     THC  -     Tramadol  -     Codeine, Hydrocodone, Hydropmorphone, Morphine  -     Bath Salts  -     Ethyl Glucuronide/Ethyl Sulfate  -     Kratom  -     Spice  -     Methylphenidate  -     Phentermine  -     Validity Oxidant  -     Validity Creatinine  -     Validity pH  -     Validity Specific  - buprenorphine-naloxone (Suboxone) 8-2 mg; One or two strips sl q day. -     naloxone (Narcan) 4 mg/0.1 mL nasal spray; 0.1 mL (4 mg total) into each nostril as needed (respiratory depression or possible OD)  -     [facility] All Prescribed Meds and Special Instructions  -     Amphetamines, Methamphetamines  -     Butalbital  -     Phenobarbital  -     Secobarbital  -     Alprazolam  -     Clonazepam  -     Diazepam  -     Lorazepam  -     Gabapentin  -     Pregabalin  -     Cocaine  -     Heroin  -     Buprenorphine  -     Levorphanol  -     Meperidine  -     Naltrexone  -     Fentanyl  -     Methadone  -     Oxycodone  -     Tapentadol  -     THC  -     Tramadol  -     Codeine, Hydrocodone, Hydropmorphone, Morphine  -     Bath Salts  -     Ethyl Glucuronide/Ethyl Sulfate  -     Kratom  -     Spice  -     Methylphenidate  -     Phentermine  -     Validity Oxidant  -     Validity Creatinine  -     Validity pH  -     Validity Specific    Encounter for monitoring Suboxone maintenance therapy  -     [facility] All Prescribed Meds and Special Instructions  -     Amphetamines, Methamphetamines  -     Butalbital  -     Phenobarbital  -     Secobarbital  -     Alprazolam  -     Clonazepam  -     Diazepam, Temazepam, Oxazepam  -     Lorazepam  -     Gabapentin  -     Pregabalin  -     Cocaine  -     Heroin  -     Buprenorphine  -     Levorphanol  -     Meperidine  -     Naltrexone  -     Fentanyl  -     Methadone  -     Oxycodone  -     Tapentadol  -     THC  -     Tramadol  -     Codeine, Hydrocodone, Hydropmorphone, Morphine  -     Bath Salts  -     Ethyl Glucuronide/Ethyl Sulfate  -     Kratom  -     Spice  -     Methylphenidate  -     Phentermine  -     Validity Oxidant  -     Validity Creatinine  -     Validity pH  -     Validity Specific  -     buprenorphine-naloxone (Suboxone) 8-2 mg; One or two strips sl q day.   -     naloxone (Narcan) 4 mg/0.1 mL nasal spray; 0.1 mL (4 mg total) into each nostril as needed (respiratory depression or possible OD)  -     [facility] All Prescribed Meds and Special Instructions  -     Amphetamines, Methamphetamines  -     Butalbital  -     Phenobarbital  -     Secobarbital  -     Alprazolam  -     Clonazepam  -     Diazepam  -     Lorazepam  -     Gabapentin  -     Pregabalin  -     Cocaine  -     Heroin  -     Buprenorphine  -     Levorphanol  -     Meperidine  -     Naltrexone  -     Fentanyl  -     Methadone  -     Oxycodone  -     Tapentadol  -     THC  -     Tramadol  -     Codeine, Hydrocodone, Hydropmorphone, Morphine  -     Bath Salts  -     Ethyl Glucuronide/Ethyl Sulfate  -     Kratom  -     Spice  -     Methylphenidate  -     Phentermine  -     Validity Oxidant  -     Validity Creatinine  -     Validity pH  -     Validity Specific    Medication therapy continued  -     [facility] All Prescribed Meds and Special Instructions  -     Amphetamines, Methamphetamines  -     Butalbital  -     Phenobarbital  -     Secobarbital  -     Alprazolam  -     Clonazepam  -     Diazepam, Temazepam, Oxazepam  -     Lorazepam  -     Gabapentin  -     Pregabalin  -     Cocaine  -     Heroin  -     Buprenorphine  -     Levorphanol  -     Meperidine  -     Naltrexone  -     Fentanyl  -     Methadone  -     Oxycodone  -     Tapentadol  -     THC  -     Tramadol  -     Codeine, Hydrocodone, Hydropmorphone, Morphine  -     Bath Salts  -     Ethyl Glucuronide/Ethyl Sulfate  -     Kratom  -     Spice  -     Methylphenidate  -     Phentermine  -     Validity Oxidant  -     Validity Creatinine  -     Validity pH  -     Validity Specific  -     buprenorphine-naloxone (Suboxone) 8-2 mg; One or two strips sl q day.   -     naloxone (Narcan) 4 mg/0.1 mL nasal spray; 0.1 mL (4 mg total) into each nostril as needed (respiratory depression or possible OD)  -     [facility] All Prescribed Meds and Special Instructions  -     Amphetamines, Methamphetamines  -     Butalbital  -     Phenobarbital  - Secobarbital  -     Alprazolam  -     Clonazepam  -     Diazepam  -     Lorazepam  -     Gabapentin  -     Pregabalin  -     Cocaine  -     Heroin  -     Buprenorphine  -     Levorphanol  -     Meperidine  -     Naltrexone  -     Fentanyl  -     Methadone  -     Oxycodone  -     Tapentadol  -     THC  -     Tramadol  -     Codeine, Hydrocodone, Hydropmorphone, Morphine  -     Bath Salts  -     Ethyl Glucuronide/Ethyl Sulfate  -     Kratom  -     Spice  -     Methylphenidate  -     Phentermine  -     Validity Oxidant  -     Validity Creatinine  -     Validity pH  -     Validity Specific    IV drug abuse (HCC)  -     [facility] All Prescribed Meds and Special Instructions  -     Amphetamines, Methamphetamines  -     Butalbital  -     Phenobarbital  -     Secobarbital  -     Alprazolam  -     Clonazepam  -     Diazepam, Temazepam, Oxazepam  -     Lorazepam  -     Gabapentin  -     Pregabalin  -     Cocaine  -     Heroin  -     Buprenorphine  -     Levorphanol  -     Meperidine  -     Naltrexone  -     Fentanyl  -     Methadone  -     Oxycodone  -     Tapentadol  -     THC  -     Tramadol  -     Codeine, Hydrocodone, Hydropmorphone, Morphine  -     Bath Salts  -     Ethyl Glucuronide/Ethyl Sulfate  -     Kratom  -     Spice  -     Methylphenidate  -     Phentermine  -     Validity Oxidant  -     Validity Creatinine  -     Validity pH  -     Validity Specific  -     buprenorphine-naloxone (Suboxone) 8-2 mg; One or two strips sl q day.   -     naloxone (Narcan) 4 mg/0.1 mL nasal spray; 0.1 mL (4 mg total) into each nostril as needed (respiratory depression or possible OD)  -     [facility] All Prescribed Meds and Special Instructions  -     Amphetamines, Methamphetamines  -     Butalbital  -     Phenobarbital  -     Secobarbital  -     Alprazolam  -     Clonazepam  -     Diazepam  -     Lorazepam  -     Gabapentin  -     Pregabalin  -     Cocaine  -     Heroin  -     Buprenorphine  -     Levorphanol  -     Meperidine  - Naltrexone  -     Fentanyl  -     Methadone  -     Oxycodone  -     Tapentadol  -     THC  -     Tramadol  -     Codeine, Hydrocodone, Hydropmorphone, Morphine  -     Bath Salts  -     Ethyl Glucuronide/Ethyl Sulfate  -     Kratom  -     Spice  -     Methylphenidate  -     Phentermine  -     Validity Oxidant  -     Validity Creatinine  -     Validity pH  -     Validity Specific    Hypogonadism, ovarian        No problem-specific Assessment & Plan notes found for this encounter. A/P: stable, but in mild withdraw. Will switch from zubsolv to suboxone due to being in the [facility]. Will start with 16mg, films, dose 1/6. Continue with counseling. Narcan given to have on hand. Reminded to keep meds safe and out of reach of children. Keep f/u at [facility] for counseling. RTC one week for f/u. Subjective:      Patient ID: Lam Mae is a 34 y.o. female. WF, pt of Dr. Xiomara Micahel, for transfer of MAT from [facility] detox to OP. Pt reports starting to nasally abuse cocaine at age 32 for about two years and then entered rehab. WAs clean until age 43, with the passing of her mother who OD'd, she relapsed. First nasally on heroin, but then graduated to IV heroin along with the occasional meth. Maxed out at 2 bundles a day. Recently had to take care of her ill father and decided to enter detox. Spent four days and d/c'd to home two months ago on zubsolv. Recently ran out and admits to using about 48 hours ago. Now in withdraw. Reports prior incarceration for drug related charges, but none recently and not on probation. Denies any OD's. Currently getting counseling at [facility]. Denies being pregnant or breast feeding.         The following portions of the patient's history were reviewed and updated as appropriate:   She has a past medical history of Abnormal LFTs (liver function tests) (9/16/2023), Dehydration (9/16/2023), Hormone imbalance, Leukocytosis (5/3/2023), Opioid withdrawal ([address]) (5/3/2023), and Wears contact lenses. ,  does not have any pertinent problems on file. ,   has a past surgical history that includes Tonsillectomy; Gallbladder surgery; Cholecystectomy; pr lap rpr hrna xcpt incal/ingun ncrc8/strangulated (N/A, 04/21/2022); Hernia repair; and Tonsillectomy. ,  family history includes No Known Problems in her brother and brother; Peripheral vascular disease in her father; Substance Abuse in her mother. ,   reports that she has been smoking cigarettes. She has a 10.00 pack-year smoking history. She has been exposed to tobacco smoke. She has never used smokeless tobacco. She reports that she does not currently use drugs after having used the following drugs: Fentanyl, Heroin, and Methamphetamines. She reports that she does not drink alcohol.,  is allergic to codeine and amoxicillin. .  Current Outpatient Medications   Medication Sig Dispense Refill   • buprenorphine-naloxone (Suboxone) 8-2 mg One or two strips sl q day. 14 Film 0   • naloxone (Narcan) 4 mg/0.1 mL nasal spray 0.1 mL (4 mg total) into each nostril as needed (respiratory depression or possible OD) 1 each 1   • insulin glargine (LANTUS) 100 units/mL subcutaneous injection Inject 35 Units under the skin daily at bedtime 10 mL 5   • insulin lispro (HumaLOG) 100 units/mL injection Use TID with meals with following SS: 151-200- 5 u  201-250- 8 u  251-300- 10 u  301-350- 12 u  351-400 15 u  Greater than 401 18 u 10 mL 5   • metFORMIN (GLUCOPHAGE) 1000 MG tablet Take 1 tablet (1,000 mg total) by mouth 2 (two) times a day with meals 60 tablet 5     No current facility-administered medications for this visit. Review of Systems   Constitutional:  Positive for activity change, appetite change and fatigue. Negative for chills, diaphoresis and fever. HENT:  Positive for rhinorrhea. Negative for congestion. Eyes:  Negative for visual disturbance. Respiratory:  Negative for cough, chest tightness, shortness of breath and wheezing.     Cardiovascular: Negative for chest pain, palpitations and leg swelling. Gastrointestinal:  Positive for abdominal pain and diarrhea. Negative for constipation, nausea and vomiting. Genitourinary:  Negative for difficulty urinating, dysuria and frequency. Musculoskeletal:  Positive for myalgias. Negative for arthralgias and gait problem. Neurological:  Positive for headaches. Negative for light-headedness. Psychiatric/Behavioral:  Positive for sleep disturbance. Negative for confusion, dysphoric mood, self-injury and suicidal ideas. The patient is nervous/anxious. PHQ-2/9 Depression Screening          Objective:  Vitals:    12/07/23 1131   BP: 150/90   Pulse: 97   Temp: 97.6 °F (36.4 °C)   SpO2: 98%   Weight: 88.1 kg (194 lb 4 oz)   Height: 5' 7" (1.702 m)     Body mass index is 30.42 kg/m². Physical Exam  Vitals and nursing note reviewed. Constitutional:       General: She is not in acute distress. Appearance: Normal appearance. She is ill-appearing (withdrawing). HENT:      Head: Normocephalic and atraumatic. Mouth/Throat:      Mouth: Mucous membranes are moist.   Eyes:      Extraocular Movements: Extraocular movements intact. Conjunctiva/sclera: Conjunctivae normal.      Pupils: Pupils are equal, round, and reactive to light. Cardiovascular:      Rate and Rhythm: Normal rate and regular rhythm. Heart sounds: Normal heart sounds. No murmur heard. Pulmonary:      Effort: Pulmonary effort is normal. No respiratory distress. Breath sounds: Normal breath sounds. No wheezing, rhonchi or rales. Abdominal:      General: Bowel sounds are normal. There is no distension. Palpations: Abdomen is soft. Tenderness: There is no abdominal tenderness. Neurological:      General: No focal deficit present. Mental Status: She is alert and oriented to person, place, and time. Mental status is at baseline.    Psychiatric:         Mood and Affect: Mood normal.         Behavior: Behavior normal.         Thought Content:  Thought content normal.         Judgment: Judgment normal.

## 2023-12-09 LAB
6MAM UR QL CFM: NEGATIVE NG/ML
7AMINOCLONAZEPAM UR QL CFM: NEGATIVE NG/ML
A-OH ALPRAZ UR QL CFM: NEGATIVE NG/ML
ACCEPTABLE CREAT UR QL: NORMAL MG/DL
ACCEPTIBLE SP GR UR QL: NORMAL
AMPHET UR QL CFM: ABNORMAL NG/ML
BUPRENORPHINE UR QL CFM: NEGATIVE NG/ML
BUTALBITAL UR QL CFM: NEGATIVE NG/ML
BZE UR QL CFM: NEGATIVE NG/ML
CODEINE UR QL CFM: NEGATIVE NG/ML
EDDP UR QL CFM: NEGATIVE NG/ML
ETHYL GLUCURONIDE UR QL CFM: NEGATIVE NG/ML
ETHYL SULFATE UR QL SCN: NEGATIVE NG/ML
EUTYLONE UR QL: NEGATIVE NG/ML
FENTANYL UR QL CFM: ABNORMAL NG/ML
GLIADIN IGG SER IA-ACNC: NEGATIVE NG/ML
HYDROCODONE UR QL CFM: NEGATIVE NG/ML
HYDROMORPHONE UR QL CFM: NEGATIVE NG/ML
LORAZEPAM UR QL CFM: NEGATIVE NG/ML
ME-PHENIDATE UR QL CFM: NEGATIVE NG/ML
MEPERIDINE UR QL CFM: NEGATIVE NG/ML
METHADONE UR QL CFM: NEGATIVE NG/ML
METHAMPHET UR QL CFM: ABNORMAL NG/ML
MORPHINE UR QL CFM: NEGATIVE NG/ML
NALTREXONE UR QL CFM: NEGATIVE NG/ML
NITRITE UR QL: NORMAL UG/ML
NORBUPRENORPHINE UR QL CFM: NEGATIVE NG/ML
NORDIAZEPAM UR QL CFM: NEGATIVE NG/ML
NORFENTANYL UR QL CFM: ABNORMAL NG/ML
NORHYDROCODONE UR QL CFM: NEGATIVE NG/ML
NORMEPERIDINE UR QL CFM: NEGATIVE NG/ML
NOROXYCODONE UR QL CFM: NEGATIVE NG/ML
OXAZEPAM UR QL CFM: NEGATIVE NG/ML
OXYCODONE UR QL CFM: NEGATIVE NG/ML
OXYMORPHONE UR QL CFM: NEGATIVE NG/ML
PARA-FLUOROFENTANYL QUANTIFICATION: ABNORMAL NG/ML
PHENOBARB UR QL CFM: NEGATIVE NG/ML
RESULT ALL_PRESCRIBED MEDS AND SPECIAL INSTRUCTIONS: NORMAL
SECOBARBITAL UR QL CFM: NEGATIVE NG/ML
SL AMB 4-ANPP QUANTIFICATION: ABNORMAL NG/ML
SL AMB 5F-ADB-M7 METABOLITE QUANTIFICATION: NEGATIVE NG/ML
SL AMB 7-OH-MITRAGYNINE (KRATOM ALKALOID) QUANTIFICATION: NEGATIVE NG/ML
SL AMB AB-FUBINACA-M3 METABOLITE QUANTIFICATION: NEGATIVE NG/ML
SL AMB ACETYL FENTANYL QUANTIFICATION: ABNORMAL NG/ML
SL AMB ACETYL NORFENTANYL QUANTIFICATION: ABNORMAL NG/ML
SL AMB ACRYL FENTANYL QUANTIFICATION: ABNORMAL NG/ML
SL AMB CARFENTANIL QUANTIFICATION: ABNORMAL NG/ML
SL AMB CTHC (MARIJUANA METABOLITE) QUANTIFICATION: NEGATIVE NG/ML
SL AMB DEXTRORPHAN (DEXTROMETHORPHAN METABOLITE) QUANT: NEGATIVE NG/ML
SL AMB GABAPENTIN QUANTIFICATION: NEGATIVE
SL AMB JWH018 METABOLITE QUANTIFICATION: NEGATIVE NG/ML
SL AMB JWH073 METABOLITE QUANTIFICATION: NEGATIVE NG/ML
SL AMB MDMB-FUBINACA-M1 METABOLITE QUANTIFICATION: NEGATIVE NG/ML
SL AMB METHYLONE QUANTIFICATION: NEGATIVE NG/ML
SL AMB N-DESMETHYL-TRAMADOL QUANTIFICATION: NEGATIVE NG/ML
SL AMB PHENTERMINE QUANTIFICATION: NEGATIVE NG/ML
SL AMB PREGABALIN QUANTIFICATION: NEGATIVE
SL AMB RCS4 METABOLITE QUANTIFICATION: NEGATIVE NG/ML
SL AMB RITALINIC ACID QUANTIFICATION: NEGATIVE NG/ML
SMOOTH MUSCLE AB TITR SER IF: NEGATIVE NG/ML
SPECIMEN DRAWN SERPL: NEGATIVE NG/ML
SPECIMEN PH ACCEPTABLE UR: NORMAL
TAPENTADOL UR QL CFM: NEGATIVE NG/ML
TEMAZEPAM UR QL CFM: NEGATIVE NG/ML
TRAMADOL UR QL CFM: NEGATIVE NG/ML
URATE/CREAT 24H UR: NEGATIVE NG/ML

## 2023-12-12 LAB
LEFT EYE DIABETIC RETINOPATHY: NORMAL
RIGHT EYE DIABETIC RETINOPATHY: NORMAL
SEVERITY (EYE EXAM): NORMAL

## 2023-12-13 LAB
6MAM UR QL CFM: NEGATIVE NG/ML
7AMINOCLONAZEPAM UR QL CFM: NEGATIVE NG/ML
A-OH ALPRAZ UR QL CFM: NEGATIVE NG/ML
ACCEPTABLE CREAT UR QL: NORMAL MG/DL
ACCEPTIBLE SP GR UR QL: NORMAL
AMPHET UR QL CFM: ABNORMAL NG/ML
BUPRENORPHINE UR QL CFM: NEGATIVE NG/ML
BUTALBITAL UR QL CFM: NEGATIVE NG/ML
BZE UR QL CFM: NEGATIVE NG/ML
CODEINE UR QL CFM: NEGATIVE NG/ML
EDDP UR QL CFM: NEGATIVE NG/ML
ETHYL GLUCURONIDE UR QL CFM: NEGATIVE NG/ML
ETHYL SULFATE UR QL SCN: NEGATIVE NG/ML
EUTYLONE UR QL: NEGATIVE NG/ML
FENTANYL UR QL CFM: ABNORMAL NG/ML
GLIADIN IGG SER IA-ACNC: NEGATIVE NG/ML
HYDROCODONE UR QL CFM: NEGATIVE NG/ML
HYDROMORPHONE UR QL CFM: NEGATIVE NG/ML
LORAZEPAM UR QL CFM: NEGATIVE NG/ML
ME-PHENIDATE UR QL CFM: NEGATIVE NG/ML
MEPERIDINE UR QL CFM: NEGATIVE NG/ML
METHADONE UR QL CFM: NEGATIVE NG/ML
METHAMPHET UR QL CFM: ABNORMAL NG/ML
MORPHINE UR QL CFM: NEGATIVE NG/ML
NALTREXONE UR QL CFM: NEGATIVE NG/ML
NITRITE UR QL: NORMAL UG/ML
NORBUPRENORPHINE UR QL CFM: NEGATIVE NG/ML
NORDIAZEPAM UR QL CFM: NEGATIVE NG/ML
NORFENTANYL UR QL CFM: ABNORMAL NG/ML
NORHYDROCODONE UR QL CFM: NEGATIVE NG/ML
NORMEPERIDINE UR QL CFM: NEGATIVE NG/ML
NOROXYCODONE UR QL CFM: NEGATIVE NG/ML
OXAZEPAM UR QL CFM: NEGATIVE NG/ML
OXYCODONE UR QL CFM: NEGATIVE NG/ML
OXYMORPHONE UR QL CFM: NEGATIVE NG/ML
PARA-FLUOROFENTANYL QUANTIFICATION: NEGATIVE NG/ML
PHENOBARB UR QL CFM: NEGATIVE NG/ML
RESULT ALL_PRESCRIBED MEDS AND SPECIAL INSTRUCTIONS: NORMAL
SECOBARBITAL UR QL CFM: NEGATIVE NG/ML
SL AMB 4-ANPP QUANTIFICATION: ABNORMAL NG/ML
SL AMB 5F-ADB-M7 METABOLITE QUANTIFICATION: NEGATIVE NG/ML
SL AMB 7-OH-MITRAGYNINE (KRATOM ALKALOID) QUANTIFICATION: NEGATIVE NG/ML
SL AMB AB-FUBINACA-M3 METABOLITE QUANTIFICATION: NEGATIVE NG/ML
SL AMB ACETYL FENTANYL QUANTIFICATION: NEGATIVE NG/ML
SL AMB ACETYL NORFENTANYL QUANTIFICATION: NEGATIVE NG/ML
SL AMB ACRYL FENTANYL QUANTIFICATION: NEGATIVE NG/ML
SL AMB CARFENTANIL QUANTIFICATION: NEGATIVE NG/ML
SL AMB CTHC (MARIJUANA METABOLITE) QUANTIFICATION: NEGATIVE NG/ML
SL AMB DEXTRORPHAN (DEXTROMETHORPHAN METABOLITE) QUANT: NEGATIVE NG/ML
SL AMB GABAPENTIN QUANTIFICATION: NEGATIVE
SL AMB JWH018 METABOLITE QUANTIFICATION: NEGATIVE NG/ML
SL AMB JWH073 METABOLITE QUANTIFICATION: NEGATIVE NG/ML
SL AMB MDMB-FUBINACA-M1 METABOLITE QUANTIFICATION: NEGATIVE NG/ML
SL AMB METHYLONE QUANTIFICATION: NEGATIVE NG/ML
SL AMB N-DESMETHYL-TRAMADOL QUANTIFICATION: NEGATIVE NG/ML
SL AMB PHENTERMINE QUANTIFICATION: NEGATIVE NG/ML
SL AMB PREGABALIN QUANTIFICATION: NEGATIVE
SL AMB RCS4 METABOLITE QUANTIFICATION: NEGATIVE NG/ML
SL AMB RITALINIC ACID QUANTIFICATION: NEGATIVE NG/ML
SMOOTH MUSCLE AB TITR SER IF: NEGATIVE NG/ML
SPECIMEN DRAWN SERPL: NEGATIVE NG/ML
SPECIMEN PH ACCEPTABLE UR: NORMAL
TAPENTADOL UR QL CFM: NEGATIVE NG/ML
TEMAZEPAM UR QL CFM: NEGATIVE NG/ML
TRAMADOL UR QL CFM: NEGATIVE NG/ML
URATE/CREAT 24H UR: NEGATIVE NG/ML

## 2024-01-12 ENCOUNTER — OFFICE VISIT (OUTPATIENT)
Dept: FAMILY MEDICINE CLINIC | Facility: CLINIC | Age: 39
End: 2024-01-12
Payer: COMMERCIAL

## 2024-01-12 VITALS
TEMPERATURE: 96.5 F | HEIGHT: 67 IN | DIASTOLIC BLOOD PRESSURE: 90 MMHG | OXYGEN SATURATION: 99 % | HEART RATE: 101 BPM | BODY MASS INDEX: 28.25 KG/M2 | SYSTOLIC BLOOD PRESSURE: 140 MMHG | WEIGHT: 180 LBS

## 2024-01-12 DIAGNOSIS — K21.9 GASTROESOPHAGEAL REFLUX DISEASE WITHOUT ESOPHAGITIS: ICD-10-CM

## 2024-01-12 DIAGNOSIS — Z72.0 TOBACCO USE: ICD-10-CM

## 2024-01-12 DIAGNOSIS — Z79.4 TYPE 2 DIABETES MELLITUS WITH HYPERGLYCEMIA, WITH LONG-TERM CURRENT USE OF INSULIN (HCC): Primary | ICD-10-CM

## 2024-01-12 DIAGNOSIS — B37.31 CANDIDIASIS, VAGINA: ICD-10-CM

## 2024-01-12 DIAGNOSIS — F11.20 OPIOID USE DISORDER, SEVERE, DEPENDENCE (HCC): ICD-10-CM

## 2024-01-12 DIAGNOSIS — E11.65 TYPE 2 DIABETES MELLITUS WITH HYPERGLYCEMIA, WITH LONG-TERM CURRENT USE OF INSULIN (HCC): Primary | ICD-10-CM

## 2024-01-12 PROCEDURE — 99214 OFFICE O/P EST MOD 30 MIN: CPT | Performed by: FAMILY MEDICINE

## 2024-01-12 RX ORDER — INSULIN ASPART 100 [IU]/ML
INJECTION, SOLUTION INTRAVENOUS; SUBCUTANEOUS
Qty: 15 ML | Refills: 5 | Status: SHIPPED | OUTPATIENT
Start: 2024-01-12

## 2024-01-12 RX ORDER — FLUCONAZOLE 150 MG/1
TABLET ORAL
Qty: 2 TABLET | Refills: 0 | Status: SHIPPED | OUTPATIENT
Start: 2024-01-12 | End: 2024-01-15

## 2024-01-12 RX ORDER — OMEPRAZOLE 20 MG/1
20 CAPSULE, DELAYED RELEASE ORAL DAILY
Qty: 30 CAPSULE | Refills: 2 | Status: SHIPPED | OUTPATIENT
Start: 2024-01-12

## 2024-01-12 NOTE — PROGRESS NOTES
Name: Estefani Arenas      : 1985      MRN: [id number]  Encounter Provider: Marguerite Pierce DO  Encounter Date: 2024   Encounter department: [facility] PRIMARY CARE    Assessment & Plan     1. Type 2 diabetes mellitus with hyperglycemia, with long-term current use of insulin (HCC)  Assessment & Plan:    Lab Results   Component Value Date    HGBA1C 12.5 (H) 2023   A1c level today.  Baseline labs.  Increase Lantus to 40 units twice daily.  Due to some muscular cramping reportedly from the Humalog, switch to NovoLog pen insulin sliding scale before every meal.  Same sliding scale as previously administered with her Humalog.  Endo referral: Keyla Damon PA-C.  Contact information provided.     Orders:  -     insulin aspart (NovoLOG FlexPen) 100 UNIT/ML injection pen; Use TID with meals with following SS: 151-200- 5 u  201-250- 8 u  251-300- 10 u  301-350- 12 u  351-400 15 u  Greater than 401 18 u  -     Insulin syringes  -     Insulin Pen Needle 29G X 12MM MISC; Use 4 (four) times a day  -     CBC and differential; Future  -     Comprehensive metabolic panel; Future; Expected date: 2024  -     Albumin / creatinine urine ratio; Future  -     Insulin and C-Peptide, Serum; Future  -     Lipid panel; Future  -     Hemoglobin A1C; Future; Expected date: 2024    2. Gastroesophageal reflux disease without esophagitis  -     omeprazole (PriLOSEC) 20 mg delayed release capsule; Take 1 capsule (20 mg total) by mouth daily    3. Candidiasis, vagina  -     fluconazole (DIFLUCAN) 150 mg tablet; 1 PO, then repeat in 72 hours if needed    4. Opioid use disorder, severe, dependence (HCC)  Assessment & Plan:  Receiving Suboxone since detoxing, followed by physician locally.      5. Tobacco use  Assessment & Plan:  Encourage smoking cessation          Tobacco Cessation Counseling: Tobacco cessation counseling was provided. The patient is sincerely urged to quit consumption  of tobacco. She is not ready to quit tobacco.         Subjective      F/u DM--   blood sugars are uncontrolled.  Titrated her Lantus up to 52 units daily.  Sugar still running in the 500-600 range per patient.  Overdue for labs including A1c.  Compliant with metformin per patient.  States she had a viral gastroenteritis right around [place] time and since then, blood pressures have spiked, diet and appetite have been poor.  Complains of muscle cramps secondary to her short acting insulin given at mealtime.  Continues to use the same insulin sliding scale with meals as prior with Humalog.    History of opioid abuse--    following with Dr. Kiran, utilizing Suboxone currently.  Seems motivated to wean off Suboxone as quickly as able.  Reports variety, last use was in December.    GERD--   newer symptoms of reflux recently.  She states that this point even water causes severe heartburn.    Candidiasis--   complains of vaginal itch and discharge since her sugars have increased.      Review of Systems   Constitutional:  Positive for fatigue. Negative for activity change, appetite change, chills and unexpected weight change.   Eyes:  Negative for visual disturbance.   Respiratory:  Negative for apnea, cough, chest tightness and shortness of breath.    Cardiovascular:  Negative for chest pain, palpitations and leg swelling.   Gastrointestinal:  Negative for abdominal distention, abdominal pain and nausea.   Neurological:  Negative for dizziness and weakness.       Current Outpatient Medications on File Prior to Visit   Medication Sig   • Blood Glucose Monitoring Suppl (ONE TOUCH ULTRA 2) w/Device KIT Use as directed   • buprenorphine-naloxone (Suboxone) 8-2 mg One or two strips sl q day.   • insulin glargine (LANTUS) 100 units/mL subcutaneous injection Inject 35 Units under the skin daily at bedtime   • Lancets (OneTouch Delica Plus [id number]) MISC use 1 LANCET to TEST BLOOD SUGAR four times a day   • metFORMIN  "(GLUCOPHAGE) 1000 MG tablet Take 1 tablet (1,000 mg total) by mouth 2 (two) times a day with meals   • naloxone (Narcan) 4 mg/0.1 mL nasal spray 0.1 mL (4 mg total) into each nostril as needed (respiratory depression or possible OD)   • OneTouch Ultra test strip use 1 TEST STRIP to TEST BLOOD SUGAR four times a day   • [DISCONTINUED] insulin lispro (HumaLOG) 100 units/mL injection Use TID with meals with following SS: 151-200- 5 u  201-250- 8 u  251-300- 10 u  301-350- 12 u  351-400 15 u  Greater than 401 18 u       Objective     /90 (BP Location: Left arm, Patient Position: Sitting, Cuff Size: Standard)   Pulse 101   Temp (!) 96.5 °F (35.8 °C) (Tympanic)   Ht 5' 7\" (1.702 m)   Wt 81.6 kg (180 lb)   SpO2 99%   BMI 28.19 kg/m²     Physical Exam  Vitals and nursing note reviewed.   Constitutional:       General: She is not in acute distress.     Appearance: Normal appearance.   HENT:      Head: Normocephalic and atraumatic.   Cardiovascular:      Rate and Rhythm: Normal rate and regular rhythm.      Pulses: Normal pulses.      Heart sounds: No murmur heard.  Pulmonary:      Effort: Pulmonary effort is normal.      Breath sounds: Normal breath sounds. No wheezing, rhonchi or rales.   Musculoskeletal:      Cervical back: Normal range of motion and neck supple. No tenderness.   Lymphadenopathy:      Cervical: No cervical adenopathy.   Neurological:      General: No focal deficit present.      Mental Status: She is alert and oriented to person, place, and time.   Psychiatric:         Mood and Affect: Mood normal.         Behavior: Behavior normal.         Thought Content: Thought content normal.         Judgment: Judgment normal.       Marguerite Pierce, DO    "

## 2024-01-12 NOTE — ASSESSMENT & PLAN NOTE
Lab Results   Component Value Date    HGBA1C 12.5 (H) 09/17/2023   A1c level today.  Baseline labs.  Increase Lantus to 40 units twice daily.  Due to some muscular cramping reportedly from the Humalog, switch to NovoLog pen insulin sliding scale before every meal.  Same sliding scale as previously administered with her Humalog.  Endo referral: Keyla Damon PA-C.  Contact information provided.

## 2024-01-12 NOTE — ASSESSMENT & PLAN NOTE
May need blood pressure agent.  Checking urine microalbumin today.  Follow-up appointment in 4 to 6 weeks to recheck blood pressure, consider adding ACE inhibitor.

## 2024-01-24 RX ORDER — IBUPROFEN 200 MG
1 TABLET ORAL DAILY
COMMUNITY

## 2024-02-27 ENCOUNTER — OFFICE VISIT (OUTPATIENT)
Dept: FAMILY MEDICINE CLINIC | Facility: CLINIC | Age: 39
End: 2024-02-27
Payer: COMMERCIAL

## 2024-02-27 VITALS
OXYGEN SATURATION: 99 % | BODY MASS INDEX: 30.76 KG/M2 | HEIGHT: 67 IN | TEMPERATURE: 96.9 F | DIASTOLIC BLOOD PRESSURE: 80 MMHG | HEART RATE: 99 BPM | WEIGHT: 196 LBS | SYSTOLIC BLOOD PRESSURE: 110 MMHG

## 2024-02-27 DIAGNOSIS — E11.65 TYPE 2 DIABETES MELLITUS WITH HYPERGLYCEMIA, WITH LONG-TERM CURRENT USE OF INSULIN (HCC): ICD-10-CM

## 2024-02-27 DIAGNOSIS — H65.01 NON-RECURRENT ACUTE SEROUS OTITIS MEDIA OF RIGHT EAR: Primary | ICD-10-CM

## 2024-02-27 DIAGNOSIS — Z79.4 TYPE 2 DIABETES MELLITUS WITH HYPERGLYCEMIA, WITH LONG-TERM CURRENT USE OF INSULIN (HCC): ICD-10-CM

## 2024-02-27 LAB — SL AMB POCT HEMOGLOBIN AIC: 13.4 (ref ?–6.5)

## 2024-02-27 PROCEDURE — 99213 OFFICE O/P EST LOW 20 MIN: CPT | Performed by: FAMILY MEDICINE

## 2024-02-27 PROCEDURE — 83036 HEMOGLOBIN GLYCOSYLATED A1C: CPT | Performed by: FAMILY MEDICINE

## 2024-02-27 RX ORDER — INSULIN GLARGINE 100 [IU]/ML
10 INJECTION, SOLUTION SUBCUTANEOUS EVERY 12 HOURS SCHEDULED
COMMUNITY

## 2024-02-27 RX ORDER — AZITHROMYCIN 250 MG/1
TABLET, FILM COATED ORAL DAILY
Qty: 6 TABLET | Refills: 0 | Status: SHIPPED | OUTPATIENT
Start: 2024-02-27 | End: 2024-03-03

## 2024-02-27 RX ORDER — FLUTICASONE PROPIONATE 50 MCG
2 SPRAY, SUSPENSION (ML) NASAL DAILY
Qty: 9.9 ML | Refills: 5 | Status: SHIPPED | OUTPATIENT
Start: 2024-02-27

## 2024-02-27 RX ORDER — SYRING-NEEDL,DISP,INSUL,0.3 ML 31GX15/64"
SYRINGE, EMPTY DISPOSABLE MISCELLANEOUS
COMMUNITY
Start: 2024-01-24

## 2024-02-27 NOTE — PROGRESS NOTES
Name: Estefani Arenas      : 1985      MRN: [id number]  Encounter Provider: Marguerite Pierce DO  Encounter Date: 2024   Encounter department: [facility] PRIMARY CARE    Assessment & Plan     1. Non-recurrent acute serous otitis media of right ear  Comments:  PCN allergic. Add antihistamine.  Orders:  -     fluticasone (FLONASE) 50 mcg/act nasal spray; 2 sprays into each nostril daily  -     azithromycin (Zithromax) 250 mg tablet; Take 2 tablets (500 mg total) by mouth daily for 1 day, THEN 1 tablet (250 mg total) daily for 4 days.    2. Type 2 diabetes mellitus with hyperglycemia, with long-term current use of insulin ([facility])  Assessment & Plan:    Lab Results   Component Value Date    HGBA1C 13.4 (A) 2024   Uncontrolled. Pt to schedule appt with endocrinology for further management.     Orders:  -     POCT hemoglobin A1c           Subjective      Ear pain-- pain for 10 days, bilateral but R>L. Mild URI symptoms. Minor rhinorrhea. No headache. No recent swimming. Was ill frequently as a child, then had T/A and improved. Ears feel full, popping but infrequent, hearing diminished. Pain radiates into R jaw and neck. No L neck pain, no fevers.     DMII-- has not sought f/u with endo, I had placed referral at recent OV. Does reports no blood sugars above 200 in the past 3 wks.       Review of Systems   Constitutional:  Positive for fatigue. Negative for activity change, appetite change, chills and fever.   HENT:  Positive for congestion, dental problem, ear pain, postnasal drip and rhinorrhea. Negative for ear discharge, facial swelling, sinus pressure and sinus pain.    Respiratory:  Negative for cough, shortness of breath and wheezing.    Cardiovascular:  Negative for chest pain.   Gastrointestinal:  Negative for abdominal pain.   Neurological:  Negative for dizziness and headaches.       Current Outpatient Medications on File Prior to Visit   Medication Sig   • BD Veo  "Insulin Syringe U/F 31G X 15/64\" 0.3 ML MISC USE FOR INJECTION AT BEDTIME   • Blood Glucose Monitoring Suppl (ONE TOUCH ULTRA 2) w/Device KIT Use as directed   • buprenorphine-naloxone (Suboxone) 8-2 mg One or two strips sl q day.   • insulin aspart (NovoLOG FlexPen) 100 UNIT/ML injection pen Use TID with meals with following SS: 151-200- 5 u  201-250- 8 u  251-300- 10 u  301-350- 12 u  351-400 15 u  Greater than 401 18 u   • insulin glargine (Lantus) 100 units/mL subcutaneous injection Inject 10 Units under the skin every 12 (twelve) hours   • Insulin Pen Needle 29G X 12MM MISC Use 4 (four) times a day   • INSULIN SYRINGE .5CC/29G (Safety Insulin Syringes) 29G X 1/2\" 0.5 ML MISC Use 1 each in the morning   • Lancets (OneTouch Delica Plus [id number]) MISC use 1 LANCET to TEST BLOOD SUGAR four times a day   • naloxone (Narcan) 4 mg/0.1 mL nasal spray 0.1 mL (4 mg total) into each nostril as needed (respiratory depression or possible OD)   • omeprazole (PriLOSEC) 20 mg delayed release capsule Take 1 capsule (20 mg total) by mouth daily   • OneTouch Ultra test strip use 1 TEST STRIP to TEST BLOOD SUGAR four times a day   • [DISCONTINUED] insulin glargine (LANTUS) 100 units/mL subcutaneous injection Inject 35 Units under the skin daily at bedtime   • [DISCONTINUED] metFORMIN (GLUCOPHAGE) 1000 MG tablet Take 1 tablet (1,000 mg total) by mouth 2 (two) times a day with meals (Patient not taking: Reported on 2/27/2024)       Objective     /80 (BP Location: Left arm, Patient Position: Sitting, Cuff Size: Large)   Pulse 99   Temp (!) 96.9 °F (36.1 °C) (Tympanic)   Ht 5' 7\" (1.702 m)   Wt 88.9 kg (196 lb)   SpO2 99%   BMI 30.70 kg/m²     Physical Exam  Vitals and nursing note reviewed.   Constitutional:       General: She is not in acute distress.     Appearance: Normal appearance. She is not ill-appearing.   HENT:      Head: Normocephalic.      Jaw: There is normal jaw occlusion. Tenderness present.      Salivary " Glands: Right salivary gland is not diffusely enlarged or tender. Left salivary gland is not diffusely enlarged or tender.      Right Ear: Ear canal and external ear normal. A middle ear effusion is present. There is no impacted cerumen. No foreign body. No mastoid tenderness. Tympanic membrane is injected, scarred and retracted. Tympanic membrane is not perforated or erythematous.      Left Ear: Ear canal and external ear normal. A middle ear effusion is present. There is no impacted cerumen. No foreign body. No mastoid tenderness. Tympanic membrane is retracted. Tympanic membrane is not injected, scarred, perforated or erythematous.      Nose: Nose normal.      Mouth/Throat:      Mouth: Mucous membranes are moist.      Pharynx: Oropharynx is clear.   Eyes:      Pupils: Pupils are equal, round, and reactive to light.   Cardiovascular:      Rate and Rhythm: Normal rate and regular rhythm.      Pulses: Normal pulses. no weak pulses.           Dorsalis pedis pulses are 2+ on the right side and 2+ on the left side.        Posterior tibial pulses are 2+ on the right side and 2+ on the left side.      Heart sounds: Normal heart sounds. No murmur heard.  Pulmonary:      Effort: Pulmonary effort is normal. No respiratory distress.      Breath sounds: Normal breath sounds. No wheezing or rhonchi.   Musculoskeletal:      Cervical back: Normal range of motion and neck supple.   Feet:      Right foot:      Skin integrity: No ulcer, skin breakdown, erythema, warmth, callus or dry skin.      Left foot:      Skin integrity: No ulcer, skin breakdown, erythema, warmth, callus or dry skin.   Lymphadenopathy:      Cervical: No cervical adenopathy.   Skin:     General: Skin is warm.      Capillary Refill: Capillary refill takes less than 2 seconds.   Neurological:      General: No focal deficit present.      Mental Status: She is alert.   Psychiatric:         Mood and Affect: Mood normal.         Thought Content: Thought content  normal.         Judgment: Judgment normal.     Diabetic Foot Exam    Patient's shoes and socks removed.    Right Foot/Ankle   Right Foot Inspection  Skin Exam: skin normal and skin intact. No dry skin, no warmth, no callus, no erythema, no maceration, no abnormal color, no pre-ulcer, no ulcer and no callus.     Toe Exam: ROM and strength within normal limits.     Sensory   Monofilament testing: intact    Vascular  The right DP pulse is 2+. The right PT pulse is 2+.     Left Foot/Ankle  Left Foot Inspection  Skin Exam: skin normal and skin intact. No dry skin, no warmth, no erythema, no maceration, normal color, no pre-ulcer, no ulcer and no callus.     Toe Exam: ROM and strength within normal limits.     Sensory   Monofilament testing: intact    Vascular  The left DP pulse is 2+. The left PT pulse is 2+.     Assign Risk Category  No deformity present  No loss of protective sensation  No weak pulses  Risk: 0    Marguerite Pierce DO

## 2024-02-27 NOTE — ASSESSMENT & PLAN NOTE
Lab Results   Component Value Date    HGBA1C 13.4 (A) 02/27/2024   Uncontrolled. Pt to schedule appt with endocrinology for further management.

## 2024-04-11 ENCOUNTER — TELEPHONE (OUTPATIENT)
Dept: FAMILY MEDICINE CLINIC | Facility: CLINIC | Age: 39
End: 2024-04-11

## 2025-01-20 DIAGNOSIS — E11.65 TYPE 2 DIABETES MELLITUS WITH HYPERGLYCEMIA, WITH LONG-TERM CURRENT USE OF INSULIN (HCC): Primary | ICD-10-CM

## 2025-01-20 DIAGNOSIS — Z79.4 TYPE 2 DIABETES MELLITUS WITH HYPERGLYCEMIA, WITH LONG-TERM CURRENT USE OF INSULIN (HCC): Primary | ICD-10-CM

## 2025-01-20 RX ORDER — BLOOD SUGAR DIAGNOSTIC
1 STRIP MISCELLANEOUS 4 TIMES DAILY
Qty: 360 EACH | Refills: 1 | Status: SHIPPED | OUTPATIENT
Start: 2025-01-20

## 2025-01-20 RX ORDER — BLOOD SUGAR DIAGNOSTIC
1 STRIP MISCELLANEOUS 4 TIMES DAILY
COMMUNITY
End: 2025-01-20 | Stop reason: CLARIF

## 2025-03-26 ENCOUNTER — TELEPHONE (OUTPATIENT)
Age: 40
End: 2025-03-26

## 2025-03-26 DIAGNOSIS — B37.31 VAGINAL CANDIDIASIS: Primary | ICD-10-CM

## 2025-03-26 RX ORDER — FLUCONAZOLE 150 MG/1
150 TABLET ORAL ONCE
Qty: 1 TABLET | Refills: 1 | Status: SHIPPED | OUTPATIENT
Start: 2025-03-26 | End: 2025-03-26

## 2025-03-26 NOTE — TELEPHONE ENCOUNTER
WHO - patient     WHAT - yeast infection ( itching). Her BS has been running 363 to 500. Today's reading was 363.    WHEN - started 2 days ago    How Often/Duration -    Pain -    Alleviating Factors (anything they tried to use to help so far) -    Next Steps - patient has an appointment coming up on 04/03/25 I advise to move that apt up sooner to be seen for this issue she stated that she wasn't able to come in early because caring for dad. She is requesting fluconazole she was prescribe this in the pass.     Callback- patient

## 2025-04-03 ENCOUNTER — OFFICE VISIT (OUTPATIENT)
Dept: FAMILY MEDICINE CLINIC | Facility: CLINIC | Age: 40
End: 2025-04-03
Payer: COMMERCIAL

## 2025-04-03 ENCOUNTER — TELEPHONE (OUTPATIENT)
Age: 40
End: 2025-04-03

## 2025-04-03 VITALS
HEIGHT: 67 IN | DIASTOLIC BLOOD PRESSURE: 102 MMHG | OXYGEN SATURATION: 97 % | HEART RATE: 75 BPM | TEMPERATURE: 98.6 F | BODY MASS INDEX: 33.62 KG/M2 | WEIGHT: 214.2 LBS | SYSTOLIC BLOOD PRESSURE: 148 MMHG

## 2025-04-03 DIAGNOSIS — R21 RASH: ICD-10-CM

## 2025-04-03 DIAGNOSIS — E87.1 HYPONATREMIA: ICD-10-CM

## 2025-04-03 DIAGNOSIS — K21.9 GASTROESOPHAGEAL REFLUX DISEASE WITHOUT ESOPHAGITIS: ICD-10-CM

## 2025-04-03 DIAGNOSIS — I10 PRIMARY HYPERTENSION: ICD-10-CM

## 2025-04-03 DIAGNOSIS — R53.1 GENERALIZED WEAKNESS: ICD-10-CM

## 2025-04-03 DIAGNOSIS — E11.65 TYPE 2 DIABETES MELLITUS WITH HYPERGLYCEMIA, WITH LONG-TERM CURRENT USE OF INSULIN (HCC): Primary | ICD-10-CM

## 2025-04-03 DIAGNOSIS — E55.9 VITAMIN D DEFICIENCY: ICD-10-CM

## 2025-04-03 DIAGNOSIS — Z79.4 TYPE 2 DIABETES MELLITUS WITH HYPERGLYCEMIA, WITH LONG-TERM CURRENT USE OF INSULIN (HCC): Primary | ICD-10-CM

## 2025-04-03 PROCEDURE — 99214 OFFICE O/P EST MOD 30 MIN: CPT | Performed by: FAMILY MEDICINE

## 2025-04-03 RX ORDER — LISINOPRIL 10 MG/1
10 TABLET ORAL DAILY
Qty: 30 TABLET | Refills: 1 | Status: SHIPPED | OUTPATIENT
Start: 2025-04-03

## 2025-04-03 RX ORDER — INSULIN GLARGINE 100 [IU]/ML
10 INJECTION, SOLUTION SUBCUTANEOUS EVERY 12 HOURS SCHEDULED
Qty: 10 ML | Refills: 2 | Status: SHIPPED | OUTPATIENT
Start: 2025-04-03

## 2025-04-03 RX ORDER — INSULIN ASPART 100 [IU]/ML
INJECTION, SOLUTION INTRAVENOUS; SUBCUTANEOUS
Qty: 15 ML | Refills: 5 | Status: SHIPPED | OUTPATIENT
Start: 2025-04-03 | End: 2025-04-03 | Stop reason: SDUPTHER

## 2025-04-03 RX ORDER — CLOBETASOL PROPIONATE 0.5 MG/G
OINTMENT TOPICAL 2 TIMES DAILY
Qty: 30 G | Refills: 1 | Status: SHIPPED | OUTPATIENT
Start: 2025-04-03

## 2025-04-03 RX ORDER — INSULIN ASPART 100 [IU]/ML
INJECTION, SOLUTION INTRAVENOUS; SUBCUTANEOUS
Qty: 15 ML | Refills: 5 | Status: SHIPPED | OUTPATIENT
Start: 2025-04-03

## 2025-04-03 RX ORDER — PEN NEEDLE, DIABETIC 33 GX5/32"
100 NEEDLE, DISPOSABLE MISCELLANEOUS 4 TIMES DAILY
Qty: 100 EACH | Refills: 11 | Status: SHIPPED | OUTPATIENT
Start: 2025-04-03 | End: 2025-04-03

## 2025-04-03 NOTE — ASSESSMENT & PLAN NOTE
Needs updated A1c, along with other labs.   Will meet again in 1 week to review labs and manage accordingly.  Lab Results   Component Value Date    HGBA1C 13.4 (A) 02/27/2024       Orders:  •  Hemoglobin A1C  •  CBC and differential  •  Comprehensive metabolic panel  •  TSH, 3rd generation  •  Microalbumin, Random Urine (W/Creatinine)  •  insulin glargine (Lantus) 100 units/mL subcutaneous injection; Inject 10 Units under the skin every 12 (twelve) hours  •  Insulin Pen Needle 32G X 4 MM MISC; Use 4 (four) times a day  •  insulin aspart (NovoLOG FlexPen) 100 UNIT/ML injection pen; Use TID with meals with following SS: 151-200- 5 u  201-250- 8 u  251-300- 10 u  301-350- 12 u  351-400 15 u  Greater than 401 18 u . max 40 units daily

## 2025-04-03 NOTE — ASSESSMENT & PLAN NOTE
Patient believes she was on an ACE inhibitor quite a while ago to treat hypertension, she is unsure why it was discontinued, possibly just ran out of medication.  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  Advised we need to restart lisinopril, initiate therapy at 10 mg daily.  Will recheck BP in 1 week.  Orders:  •  Comprehensive metabolic panel  •  lisinopril (ZESTRIL) 10 mg tablet; Take 1 tablet (10 mg total) by mouth daily

## 2025-04-03 NOTE — PROGRESS NOTES
Name: Estefani Arenas      : 1985      MRN: [id number]  Encounter Provider: Marguerite Pierce DO  Encounter Date: 4/3/2025   Encounter department: [facility] PRIMARY CARE  :  Assessment & Plan  Type 2 diabetes mellitus with hyperglycemia, with long-term current use of insulin (HCC)  Needs updated A1c, along with other labs.   Will meet again in 1 week to review labs and manage accordingly.  Lab Results   Component Value Date    HGBA1C 13.4 (A) 2024       Orders:  •  Hemoglobin A1C  •  CBC and differential  •  Comprehensive metabolic panel  •  TSH, 3rd generation  •  Microalbumin, Random Urine (W/Creatinine)  •  insulin glargine (Lantus) 100 units/mL subcutaneous injection; Inject 10 Units under the skin every 12 (twelve) hours  •  Insulin Pen Needle 32G X 4 MM MISC; Use 4 (four) times a day  •  insulin aspart (NovoLOG FlexPen) 100 UNIT/ML injection pen; Use TID with meals with following SS: 151-200- 5 u  201-250- 8 u  251-300- 10 u  301-350- 12 u  351-400 15 u  Greater than 401 18 u . max 40 units daily    Rash  The smaller lesion on her foot more similar to erythema nodosum.  Larger lesion really does not fit this description as it has been present for quite some time.  I would like to treat with NSAIDs or p.o. steroids, however her blood pressure is quite elevated today and her blood sugars are uncontrolled.  We will draw labs, have close follow-up within the week, and for now since the lesion has been present for many months will treat with a topical steroid.  Orders:  •  C-reactive protein  •  Angiotensin converting enzyme  •  clobetasol (TEMOVATE) 0.05 % ointment; Apply topically 2 (two) times a day    Primary hypertension  Patient believes she was on an ACE inhibitor quite a while ago to treat hypertension, she is unsure why it was discontinued, possibly just ran out of medication.  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  Advised we need to restart lisinopril, initiate therapy at 10 mg daily.  Will recheck BP in 1 week.  Orders:  •  Comprehensive metabolic panel  •  lisinopril (ZESTRIL) 10 mg tablet; Take 1 tablet (10 mg total) by mouth daily    Hyponatremia  Pseudo.  History of, related to hyper glycemia.       Gastroesophageal reflux disease without esophagitis         Vitamin D deficiency    Orders:  •  Vitamin D 25 hydroxy    Generalized weakness                History of Present Illness   Patient here with complaints of rash and leg cramps, she arrived over 20 minutes late to her appointment.  Is been well over a year since she has had a routine appointment regarding her diabetic management.    Rash stared with lesion RLE lateral to ankle, enlarging now, present x 6+ months per patient. Zhong, itching and hurts to scratch. Very sensitive, cannot wear cuffed pants. No fevers. Now with smaller lesion R foot, started with similar periods.  Somewhat nodular in nature, erythema overlying, also tender.  She has only been using dry skin creams which are not helping.    Also complains of leg cramps, weakness, this is an ongoing complaint.  Symptoms for many years.  She does not think she is dehydrated as she is chronically thirsty and drinks ample amount of water per day.  However, also reports blood sugars very high as of late.  Fasting sugar this morning over 300.  Manages her own insulin, as stated above she has not had proper follow-up in quite some time.             Review of Systems   Constitutional:  Negative for activity change, appetite change and fever.   HENT:  Negative for trouble swallowing.    Respiratory:  Negative for apnea, cough, chest tightness and shortness of breath.    Cardiovascular:  Negative for chest pain, palpitations and leg swelling.   Gastrointestinal:  Positive for abdominal pain.   Musculoskeletal:  Positive for arthralgias. Negative for back pain and gait problem.   Skin:  Positive for  "rash.   Neurological:  Positive for weakness. Negative for dizziness and light-headedness.       Objective   BP (!) 148/102 (BP Location: Left arm, Patient Position: Sitting, Cuff Size: Adult)   Pulse 75   Temp 98.6 °F (37 °C) (Tympanic)   Ht 5' 7\" (1.702 m)   Wt 97.2 kg (214 lb 3.2 oz)   SpO2 97%   BMI 33.55 kg/m²      Physical Exam  Vitals and nursing note reviewed.   Constitutional:       General: She is not in acute distress.     Appearance: Normal appearance. She is not ill-appearing.   HENT:      Head: Normocephalic.   Cardiovascular:      Rate and Rhythm: Normal rate and regular rhythm.      Heart sounds: Normal heart sounds. No murmur heard.  Pulmonary:      Effort: Pulmonary effort is normal. No respiratory distress.      Breath sounds: Normal breath sounds. No stridor. No wheezing.   Musculoskeletal:      Cervical back: Normal range of motion and neck supple. No rigidity or tenderness.   Lymphadenopathy:      Cervical: No cervical adenopathy.   Skin:     Capillary Refill: Capillary refill takes less than 2 seconds.      Comments: Larger lesion 5cm by 4.5cm   Neurological:      General: No focal deficit present.      Mental Status: She is alert and oriented to person, place, and time. Mental status is at baseline.      Cranial Nerves: No cranial nerve deficit.   Psychiatric:         Mood and Affect: Mood normal.         Thought Content: Thought content normal.         Judgment: Judgment normal.           "

## 2025-04-03 NOTE — TELEPHONE ENCOUNTER
Pharmacy called for clarification:      insulin aspart (NovoLOG FlexPen) 100 UNIT/ML injection pen has no max per day, he states that is needed for insurance billing.       Insulin Pen Needle (Pen Needles) 33G X 4 MM MISC  he states he does not have that size, requests it be changed to 32g x 4mm.      Requests changes be sent over so they can fill patient's refills.

## 2025-04-04 ENCOUNTER — APPOINTMENT (OUTPATIENT)
Dept: LAB | Facility: HOSPITAL | Age: 40
End: 2025-04-04
Payer: COMMERCIAL

## 2025-04-04 DIAGNOSIS — Z79.4 TYPE 2 DIABETES MELLITUS WITHOUT COMPLICATION, WITH LONG-TERM CURRENT USE OF INSULIN (HCC): Primary | ICD-10-CM

## 2025-04-04 DIAGNOSIS — E11.9 TYPE 2 DIABETES MELLITUS WITHOUT COMPLICATION, WITH LONG-TERM CURRENT USE OF INSULIN (HCC): Primary | ICD-10-CM

## 2025-04-04 LAB
25(OH)D3 SERPL-MCNC: 13.1 NG/ML (ref 30–100)
ALBUMIN SERPL BCG-MCNC: 4 G/DL (ref 3.5–5)
ALP SERPL-CCNC: 133 U/L (ref 34–104)
ALT SERPL W P-5'-P-CCNC: 47 U/L (ref 7–52)
ANION GAP SERPL CALCULATED.3IONS-SCNC: 9 MMOL/L (ref 4–13)
AST SERPL W P-5'-P-CCNC: 28 U/L (ref 13–39)
BASOPHILS # BLD AUTO: 0.05 THOUSANDS/ÂΜL (ref 0–0.1)
BASOPHILS NFR BLD AUTO: 1 % (ref 0–1)
BILIRUB SERPL-MCNC: 0.63 MG/DL (ref 0.2–1)
BUN SERPL-MCNC: 14 MG/DL (ref 5–25)
CALCIUM SERPL-MCNC: 9.5 MG/DL (ref 8.4–10.2)
CHLORIDE SERPL-SCNC: 99 MMOL/L (ref 96–108)
CO2 SERPL-SCNC: 28 MMOL/L (ref 21–32)
CREAT SERPL-MCNC: 0.95 MG/DL (ref 0.6–1.3)
CREAT UR-MCNC: 151 MG/DL
CRP SERPL QL: 3.3 MG/L
EOSINOPHIL # BLD AUTO: 0.21 THOUSAND/ÂΜL (ref 0–0.61)
EOSINOPHIL NFR BLD AUTO: 4 % (ref 0–6)
ERYTHROCYTE [DISTWIDTH] IN BLOOD BY AUTOMATED COUNT: 11.9 % (ref 11.6–15.1)
EST. AVERAGE GLUCOSE BLD GHB EST-MCNC: 361 MG/DL
GFR SERPL CREATININE-BSD FRML MDRD: 74 ML/MIN/1.73SQ M
GLUCOSE P FAST SERPL-MCNC: 243 MG/DL (ref 65–99)
HBA1C MFR BLD: 14.2 %
HCT VFR BLD AUTO: 47.1 % (ref 34.8–46.1)
HGB BLD-MCNC: 15.4 G/DL (ref 11.5–15.4)
IMM GRANULOCYTES # BLD AUTO: 0.01 THOUSAND/UL (ref 0–0.2)
IMM GRANULOCYTES NFR BLD AUTO: 0 % (ref 0–2)
LYMPHOCYTES # BLD AUTO: 2.01 THOUSANDS/ÂΜL (ref 0.6–4.47)
LYMPHOCYTES NFR BLD AUTO: 38 % (ref 14–44)
MCH RBC QN AUTO: 28.8 PG (ref 26.8–34.3)
MCHC RBC AUTO-ENTMCNC: 32.7 G/DL (ref 31.4–37.4)
MCV RBC AUTO: 88 FL (ref 82–98)
MICROALBUMIN UR-MCNC: 11.5 MG/L
MICROALBUMIN/CREAT 24H UR: 8 MG/G CREATININE (ref 0–30)
MONOCYTES # BLD AUTO: 0.32 THOUSAND/ÂΜL (ref 0.17–1.22)
MONOCYTES NFR BLD AUTO: 6 % (ref 4–12)
NEUTROPHILS # BLD AUTO: 2.74 THOUSANDS/ÂΜL (ref 1.85–7.62)
NEUTS SEG NFR BLD AUTO: 51 % (ref 43–75)
NRBC BLD AUTO-RTO: 0 /100 WBCS
PLATELET # BLD AUTO: 235 THOUSANDS/UL (ref 149–390)
PMV BLD AUTO: 10.3 FL (ref 8.9–12.7)
POTASSIUM SERPL-SCNC: 4.1 MMOL/L (ref 3.5–5.3)
PROT SERPL-MCNC: 6.9 G/DL (ref 6.4–8.4)
RBC # BLD AUTO: 5.35 MILLION/UL (ref 3.81–5.12)
SODIUM SERPL-SCNC: 136 MMOL/L (ref 135–147)
TSH SERPL DL<=0.05 MIU/L-ACNC: 9.47 UIU/ML (ref 0.45–4.5)
WBC # BLD AUTO: 5.34 THOUSAND/UL (ref 4.31–10.16)

## 2025-04-04 PROCEDURE — 82306 VITAMIN D 25 HYDROXY: CPT | Performed by: FAMILY MEDICINE

## 2025-04-04 PROCEDURE — 84443 ASSAY THYROID STIM HORMONE: CPT | Performed by: FAMILY MEDICINE

## 2025-04-04 PROCEDURE — 80053 COMPREHEN METABOLIC PANEL: CPT | Performed by: FAMILY MEDICINE

## 2025-04-04 PROCEDURE — 83036 HEMOGLOBIN GLYCOSYLATED A1C: CPT | Performed by: FAMILY MEDICINE

## 2025-04-04 PROCEDURE — 85025 COMPLETE CBC W/AUTO DIFF WBC: CPT | Performed by: FAMILY MEDICINE

## 2025-04-04 PROCEDURE — 86140 C-REACTIVE PROTEIN: CPT | Performed by: FAMILY MEDICINE

## 2025-04-04 PROCEDURE — 36415 COLL VENOUS BLD VENIPUNCTURE: CPT | Performed by: FAMILY MEDICINE

## 2025-04-04 PROCEDURE — 82164 ANGIOTENSIN I ENZYME TEST: CPT | Performed by: FAMILY MEDICINE

## 2025-04-04 PROCEDURE — 82570 ASSAY OF URINE CREATININE: CPT

## 2025-04-04 PROCEDURE — 82043 UR ALBUMIN QUANTITATIVE: CPT

## 2025-04-07 ENCOUNTER — TELEPHONE (OUTPATIENT)
Age: 40
End: 2025-04-07

## 2025-04-07 LAB — ACE SERPL-CCNC: 96 U/L (ref 14–82)

## 2025-04-07 NOTE — TELEPHONE ENCOUNTER
PA for (Lantus) 100 units/mL subcutaneous injection SUBMITTED to     via    [x]CMM-KEY: [id number]  []Surescripts-Case ID #   []Availity-Auth ID # NDC #   []Faxed to plan   []Other website   []Phone call Case ID #     [x]PA sent as URGENT    All office notes, labs and other pertaining documents and studies sent. Clinical questions answered. Awaiting determination from insurance company.     Turnaround time for your insurance to make a decision on your Prior Authorization can take 7-21 business days.

## 2025-04-09 ENCOUNTER — OFFICE VISIT (OUTPATIENT)
Dept: FAMILY MEDICINE CLINIC | Facility: CLINIC | Age: 40
End: 2025-04-09
Payer: COMMERCIAL

## 2025-04-09 VITALS
BODY MASS INDEX: 33.59 KG/M2 | HEIGHT: 67 IN | OXYGEN SATURATION: 97 % | WEIGHT: 214 LBS | SYSTOLIC BLOOD PRESSURE: 132 MMHG | DIASTOLIC BLOOD PRESSURE: 74 MMHG | HEART RATE: 69 BPM

## 2025-04-09 DIAGNOSIS — I10 PRIMARY HYPERTENSION: ICD-10-CM

## 2025-04-09 DIAGNOSIS — R21 RASH: ICD-10-CM

## 2025-04-09 DIAGNOSIS — E11.65 TYPE 2 DIABETES MELLITUS WITH HYPERGLYCEMIA, WITH LONG-TERM CURRENT USE OF INSULIN (HCC): Primary | ICD-10-CM

## 2025-04-09 DIAGNOSIS — Z79.4 TYPE 2 DIABETES MELLITUS WITH HYPERGLYCEMIA, WITH LONG-TERM CURRENT USE OF INSULIN (HCC): Primary | ICD-10-CM

## 2025-04-09 DIAGNOSIS — B37.31 VAGINAL CANDIDIASIS: ICD-10-CM

## 2025-04-09 PROCEDURE — 99214 OFFICE O/P EST MOD 30 MIN: CPT | Performed by: FAMILY MEDICINE

## 2025-04-09 RX ORDER — FLUCONAZOLE 150 MG/1
TABLET ORAL
Qty: 1 TABLET | Refills: 0 | Status: SHIPPED | OUTPATIENT
Start: 2025-04-09 | End: 2025-04-12

## 2025-04-09 RX ORDER — FLURBIPROFEN SODIUM 0.3 MG/ML
SOLUTION/ DROPS OPHTHALMIC 4 TIMES DAILY
Qty: 100 EACH | Refills: 12 | Status: SHIPPED | OUTPATIENT
Start: 2025-04-09

## 2025-04-09 NOTE — ASSESSMENT & PLAN NOTE
?  Etiology.  The topical steroid seems to be alleviating some of the symptoms but obviously the lesions are not clearing.  I would advise dermatology referral with perhaps a biopsy at this point.  She declines secondary to availability.  Wants to focus on controlling her blood sugars first.  I did run an ACE level which was just above upper limit of normal, not high enough to pursue diagnosis of sarcoid, erythema nodosum.

## 2025-04-09 NOTE — PROGRESS NOTES
"Name: Estefani Arenas      : 1985      MRN: [id number]  Encounter Provider: Marguerite Pierce DO  Encounter Date: 2025   Encounter department: [facility] PRIMARY CARE  :  Assessment & Plan  Type 2 diabetes mellitus with hyperglycemia, with long-term current use of insulin (HCC)  Added metformin back. Increased basal insulin to 20 units BID and instructions given to titrate HS dosing by 2 units daily until FBS <150.  She has done this before and is familiar with these instructions.  Lab Results   Component Value Date    HGBA1C 14.2 (H) 2025       Orders:  •  BD Veo Insulin Syringe U/F 31G X 15/64\" 0.3 ML MISC; Inject under the skin 4 times a day  •  metFORMIN (GLUCOPHAGE) 500 mg tablet; 2 PO am, 1 PO pm    Vaginal candidiasis    Orders:  •  fluconazole (DIFLUCAN) 150 mg tablet; 1 PO , repeat x 1 in 72 hours    Primary hypertension  Restarted on ACE inhibitor, her blood pressure is better today.       Rash  ?  Etiology.  The topical steroid seems to be alleviating some of the symptoms but obviously the lesions are not clearing.  I would advise dermatology referral with perhaps a biopsy at this point.  She declines secondary to availability.  Wants to focus on controlling her blood sugars first.  I did run an ACE level which was just above upper limit of normal, not high enough to pursue diagnosis of sarcoid, erythema nodosum.                History of Present Illness   Patient here for follow-up, seen with multiple c/o last week including rash on legs, leg cramps.     Rash stared with lesion RLE lateral to ankle, enlarging now, present x 6+ months per patient. Zhong, itching and hurts to scratch. Very sensitive, cannot wear cuffed pants. No fevers. Now with smaller lesion R foot, started with similar periods.  Somewhat nodular in nature, erythema overlying, also tender.  She had only been using dry skin creams which were ineffective.  I had given her a topical agent in " "the interest of obtaining labs and not prescribing anything that could elevate her blood sugar further such as oral steroids.  She states rash is about the same as prior.  May be a little less pruritic.    Also complains of leg cramps, weakness, this is an ongoing complaint.  Symptoms for many years.  She does not think she is dehydrated as she is chronically thirsty and drinks ample amount of water per day.  However, also reports blood sugars very high as of late.  Fasting sugar this morning over 300.  Manages her own insulin, as stated above she has not had proper follow-up in quite some time.     Currently taking 20units lantus BID plus her regular SSI with meals (1-2 males daily). A1c 14+!!!!! Dx'd around 30, previously on oral medications but currently lost to follow-up.  Has recurrent vaginal candidiasis secondary to her hyperglycemia.            Review of Systems   Constitutional:  Negative for activity change, appetite change and fever.   HENT:  Negative for trouble swallowing.    Respiratory:  Negative for apnea, cough, chest tightness and shortness of breath.    Cardiovascular:  Negative for chest pain, palpitations and leg swelling.   Gastrointestinal:  Positive for abdominal pain.   Musculoskeletal:  Positive for arthralgias. Negative for back pain and gait problem.   Skin:  Positive for rash.   Neurological:  Positive for weakness. Negative for dizziness and light-headedness.       Objective   /74 (BP Location: Left arm, Patient Position: Sitting, Cuff Size: Adult)   Pulse 69   Ht 5' 7\" (1.702 m)   Wt 97.1 kg (214 lb)   SpO2 97%   BMI 33.52 kg/m²      Physical Exam  Vitals and nursing note reviewed.   Constitutional:       General: She is not in acute distress.     Appearance: Normal appearance. She is not ill-appearing.   HENT:      Head: Normocephalic.   Cardiovascular:      Rate and Rhythm: Normal rate and regular rhythm.      Heart sounds: Normal heart sounds. No murmur heard.  Pulmonary: "      Effort: Pulmonary effort is normal. No respiratory distress.      Breath sounds: Normal breath sounds. No stridor. No wheezing.   Musculoskeletal:      Cervical back: Normal range of motion and neck supple. No rigidity or tenderness.      Comments: Lesions on right lower extremity essentially unchanged.  May be a deeper shade of red versus last week.   Lymphadenopathy:      Cervical: No cervical adenopathy.   Skin:     Capillary Refill: Capillary refill takes less than 2 seconds.   Neurological:      General: No focal deficit present.      Mental Status: She is alert and oriented to person, place, and time. Mental status is at baseline.      Cranial Nerves: No cranial nerve deficit.   Psychiatric:         Mood and Affect: Mood normal.         Thought Content: Thought content normal.         Judgment: Judgment normal.

## 2025-04-09 NOTE — TELEPHONE ENCOUNTER
PA for Lantus) 100 units/mL subcutaneous injection  DENIED    Reason:(Screenshot if applicable)        Message sent to office clinical pool Yes    Denial letter scanned into Media Yes    Appeal started No (Provider will need to decide if appeal is warranted and send clinical documentation to Prior Authorization Team for initiation.)    **Please follow up with your patient regarding denial and next steps**

## 2025-04-09 NOTE — ASSESSMENT & PLAN NOTE
"Added metformin back. Increased basal insulin to 20 units BID and instructions given to titrate HS dosing by 2 units daily until FBS <150.  She has done this before and is familiar with these instructions.  Lab Results   Component Value Date    HGBA1C 14.2 (H) 04/04/2025       Orders:  •  BD Veo Insulin Syringe U/F 31G X 15/64\" 0.3 ML MISC; Inject under the skin 4 times a day  •  metFORMIN (GLUCOPHAGE) 500 mg tablet; 2 PO am, 1 PO pm  "

## 2025-05-15 ENCOUNTER — OFFICE VISIT (OUTPATIENT)
Dept: FAMILY MEDICINE CLINIC | Facility: CLINIC | Age: 40
End: 2025-05-15
Payer: COMMERCIAL

## 2025-05-15 VITALS
HEART RATE: 94 BPM | DIASTOLIC BLOOD PRESSURE: 74 MMHG | HEIGHT: 67 IN | OXYGEN SATURATION: 95 % | WEIGHT: 212 LBS | BODY MASS INDEX: 33.27 KG/M2 | SYSTOLIC BLOOD PRESSURE: 118 MMHG

## 2025-05-15 DIAGNOSIS — K21.9 GASTROESOPHAGEAL REFLUX DISEASE WITHOUT ESOPHAGITIS: ICD-10-CM

## 2025-05-15 DIAGNOSIS — E11.65 TYPE 2 DIABETES MELLITUS WITH HYPERGLYCEMIA, WITH LONG-TERM CURRENT USE OF INSULIN (HCC): Primary | ICD-10-CM

## 2025-05-15 DIAGNOSIS — Z79.4 TYPE 2 DIABETES MELLITUS WITH HYPERGLYCEMIA, WITH LONG-TERM CURRENT USE OF INSULIN (HCC): Primary | ICD-10-CM

## 2025-05-15 DIAGNOSIS — I10 PRIMARY HYPERTENSION: ICD-10-CM

## 2025-05-15 DIAGNOSIS — R21 RASH: ICD-10-CM

## 2025-05-15 PROCEDURE — 99214 OFFICE O/P EST MOD 30 MIN: CPT | Performed by: FAMILY MEDICINE

## 2025-05-15 RX ORDER — INSULIN ASPART 100 [IU]/ML
10 INJECTION, SOLUTION INTRAVENOUS; SUBCUTANEOUS
Qty: 15 ML | Refills: 5 | Status: SHIPPED | OUTPATIENT
Start: 2025-05-15 | End: 2025-06-14

## 2025-05-15 RX ORDER — OMEPRAZOLE 20 MG/1
20 CAPSULE, DELAYED RELEASE ORAL DAILY
Qty: 90 CAPSULE | Refills: 1 | Status: SHIPPED | OUTPATIENT
Start: 2025-05-15

## 2025-05-15 RX ORDER — LISINOPRIL 10 MG/1
10 TABLET ORAL DAILY
Qty: 90 TABLET | Refills: 1 | Status: SHIPPED | OUTPATIENT
Start: 2025-05-15

## 2025-05-15 NOTE — ASSESSMENT & PLAN NOTE
Orders:  •  omeprazole (PriLOSEC) 20 mg delayed release capsule; Take 1 capsule (20 mg total) by mouth daily   DISPLAY PLAN FREE TEXT

## 2025-05-15 NOTE — ASSESSMENT & PLAN NOTE
Blood sugars trending down per patient, will check a point-of-care A1c at her next office visit.  Lab Results   Component Value Date    HGBA1C 14.2 (H) 04/04/2025       Orders:  •  insulin aspart (NovoLOG FlexPen) 100 UNIT/ML injection pen; Inject 10 Units under the skin 3 (three) times a day with meals  •  Ambulatory Referral to Podiatry; Future  pods referral for routine foot care.

## 2025-05-15 NOTE — PROGRESS NOTES
Name: Estefani Arenas      : 1985      MRN: [id number]  Encounter Provider: Marguerite Pierce DO  Encounter Date: 5/15/2025   Encounter department: [facility] PRIMARY CARE  :  Assessment & Plan  Type 2 diabetes mellitus with hyperglycemia, with long-term current use of insulin ([facility])  Blood sugars trending down per patient, will check a point-of-care A1c at her next office visit.  Lab Results   Component Value Date    HGBA1C 14.2 (H) 2025       Orders:  •  insulin aspart (NovoLOG FlexPen) 100 UNIT/ML injection pen; Inject 10 Units under the skin 3 (three) times a day with meals  •  Ambulatory Referral to Podiatry; Future  pods referral for routine foot care.   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              History of Present Illness   Patient here for follow-up:     Rash started with lesion RLE lateral to ankle, enlarging now, present x 6+ months per patient. Zhong, itching and hurts to scratch. Very sensitive, cannot wear cuffed pants. No fevers.  Then with smaller lesion R foot.  She has been using a topical agent on the area for several weeks now, minimally effective.  Slightly less pruritic.  Area seems to be darkening in color, bit more red versus prior.    Patient reports diabetes under much better control.  We added metformin back to her medical regimen, currently taking 1000 mg in the morning and 500 mg at night.  Taking 10 units basal insulin at bedtime.  Also with sliding scale NovoLog, 5 units with meals plus sliding scale according to AC blood sugar.  She reports her insurance is giving her hard time about NovoLog coverage currently.  Leg cramps have improved  "since her blood sugars have come down.          Review of Systems   Constitutional:  Negative for activity change, appetite change and fever.   HENT:  Negative for trouble swallowing.    Respiratory:  Negative for apnea, cough, chest tightness and shortness of breath.    Cardiovascular:  Negative for chest pain, palpitations and leg swelling.   Gastrointestinal:  Negative for abdominal pain.   Musculoskeletal:  Positive for arthralgias. Negative for back pain and gait problem.   Skin:  Positive for rash.   Neurological:  Negative for dizziness, weakness and light-headedness.       Objective   /74 (BP Location: Left arm, Patient Position: Sitting, Cuff Size: Extra-Large)   Pulse 94   Ht 5' 7\" (1.702 m)   Wt 96.2 kg (212 lb)   SpO2 95%   BMI 33.20 kg/m²      Physical Exam  Vitals and nursing note reviewed.   Constitutional:       General: She is not in acute distress.     Appearance: Normal appearance. She is not ill-appearing.   HENT:      Head: Normocephalic.     Cardiovascular:      Rate and Rhythm: Normal rate and regular rhythm.      Pulses: no weak pulses.           Dorsalis pedis pulses are 2+ on the right side and 2+ on the left side.        Posterior tibial pulses are 2+ on the right side and 2+ on the left side.      Heart sounds: Normal heart sounds. No murmur heard.  Pulmonary:      Effort: Pulmonary effort is normal. No respiratory distress.      Breath sounds: Normal breath sounds. No stridor. No wheezing.     Musculoskeletal:      Cervical back: Normal range of motion and neck supple. No rigidity or tenderness.      Comments: Lesions on right lower extremity essentially unchanged.  May be a deeper shade of red.    Feet:      Right foot:      Skin integrity: No ulcer, skin breakdown, erythema, warmth, callus or dry skin.      Left foot:      Skin integrity: No ulcer, skin breakdown, erythema, warmth, callus or dry skin.   Lymphadenopathy:      Cervical: No cervical adenopathy.     Skin:     " Capillary Refill: Capillary refill takes less than 2 seconds.     Neurological:      General: No focal deficit present.      Mental Status: She is alert and oriented to person, place, and time. Mental status is at baseline.      Cranial Nerves: No cranial nerve deficit.     Psychiatric:         Mood and Affect: Mood normal.         Thought Content: Thought content normal.         Judgment: Judgment normal.     Patient's shoes and socks removed.    Right Foot/Ankle   Right Foot Inspection  Skin Exam: skin normal and skin intact. No dry skin, no warmth, no callus, no erythema, no maceration, no abnormal color, no pre-ulcer, no ulcer and no callus.     Toe Exam: ROM and strength within normal limits.     Sensory   Monofilament testing: intact    Vascular  The right DP pulse is 2+. The right PT pulse is 2+.     Left Foot/Ankle  Left Foot Inspection  Skin Exam: skin normal and skin intact. No dry skin, no warmth, no erythema, no maceration, normal color, no pre-ulcer, no ulcer and no callus.     Toe Exam: ROM and strength within normal limits.     Sensory   Monofilament testing: intact    Vascular  The left DP pulse is 2+. The left PT pulse is 2+.     Assign Risk Category  Deformity present  No loss of protective sensation  No weak pulses  Risk: 0

## 2025-05-20 ENCOUNTER — TELEPHONE (OUTPATIENT)
Age: 40
End: 2025-05-20

## 2025-06-02 ENCOUNTER — OFFICE VISIT (OUTPATIENT)
Dept: FAMILY MEDICINE CLINIC | Facility: CLINIC | Age: 40
End: 2025-06-02
Payer: COMMERCIAL

## 2025-06-02 VITALS
HEART RATE: 63 BPM | SYSTOLIC BLOOD PRESSURE: 126 MMHG | DIASTOLIC BLOOD PRESSURE: 80 MMHG | TEMPERATURE: 98.3 F | OXYGEN SATURATION: 99 % | HEIGHT: 67 IN | WEIGHT: 219.8 LBS | BODY MASS INDEX: 34.5 KG/M2

## 2025-06-02 DIAGNOSIS — H69.91 EUSTACHIAN TUBE DYSFUNCTION, RIGHT: ICD-10-CM

## 2025-06-02 DIAGNOSIS — R59.9 GLANDS SWOLLEN: ICD-10-CM

## 2025-06-02 DIAGNOSIS — J01.90 ACUTE NON-RECURRENT SINUSITIS, UNSPECIFIED LOCATION: Primary | ICD-10-CM

## 2025-06-02 LAB
SARS-COV-2 AG UPPER RESP QL IA: NEGATIVE
VALID CONTROL: NORMAL

## 2025-06-02 PROCEDURE — 99213 OFFICE O/P EST LOW 20 MIN: CPT | Performed by: NURSE PRACTITIONER

## 2025-06-02 PROCEDURE — 87811 SARS-COV-2 COVID19 W/OPTIC: CPT | Performed by: NURSE PRACTITIONER

## 2025-06-02 RX ORDER — AZITHROMYCIN 250 MG/1
TABLET, FILM COATED ORAL
Qty: 6 TABLET | Refills: 0 | Status: SHIPPED | OUTPATIENT
Start: 2025-06-02 | End: 2025-06-07

## 2025-06-02 NOTE — PROGRESS NOTES
"Name: Estefani Arenas      : 1985      MRN: [id number]  Encounter Provider: ALECIA Muniz  Encounter Date: 2025   Encounter department: [facility] PRIMARY CARE  :  Assessment & Plan  Acute non-recurrent sinusitis, unspecified location    Orders:  •  azithromycin (Zithromax) 250 mg tablet; Take 2 tablets (500 mg total) by mouth daily for 1 day, THEN 1 tablet (250 mg total) daily for 4 days.  Follow-up if unresolved  Eustachian tube dysfunction, right  .  Since       Glands swollen  Follow-up if unresolved  Orders:  •  POCT Rapid Covid Ag           History of Present Illness   Sore throat and swollen glands since 48 hours ago.  Sore ear for 2 weeks.  Painful swallowing.  No fever chills or aches.  Concerned because her father is about to start [employer] and he does live with her.      Review of Systems    Objective   /80 (BP Location: Left arm, Patient Position: Sitting, Cuff Size: Adult)   Pulse 63   Temp 98.3 °F (36.8 °C) (Tympanic)   Ht 5' 7\" (1.702 m)   Wt 99.7 kg (219 lb 12.8 oz)   SpO2 99%   BMI 34.43 kg/m²      Physical Exam  HENT:      Ears:      Comments: Right ear with TM intact but retracted, moderate nasal congestion hyperemia and some yellow purulent drainage on the left     Nose: Congestion and rhinorrhea present.   Neck:      Comments: Tender over right anterior cervical and it bothers her when she swallows although I do not really palpate it  Cardiovascular:      Rate and Rhythm: Normal rate and regular rhythm.     Psychiatric:         Mood and Affect: Mood normal.         "

## 2025-06-03 ENCOUNTER — OFFICE VISIT (OUTPATIENT)
Dept: PODIATRY | Facility: CLINIC | Age: 40
End: 2025-06-03
Attending: FAMILY MEDICINE
Payer: COMMERCIAL

## 2025-06-03 VITALS — BODY MASS INDEX: 34.37 KG/M2 | WEIGHT: 219 LBS | HEIGHT: 67 IN

## 2025-06-03 DIAGNOSIS — S90.31XA CONTUSION OF RIGHT FOOT, INITIAL ENCOUNTER: ICD-10-CM

## 2025-06-03 DIAGNOSIS — E11.65 TYPE 2 DIABETES MELLITUS WITH HYPERGLYCEMIA, WITH LONG-TERM CURRENT USE OF INSULIN (HCC): Primary | ICD-10-CM

## 2025-06-03 DIAGNOSIS — L85.3 XEROSIS OF SKIN: ICD-10-CM

## 2025-06-03 DIAGNOSIS — Z79.4 TYPE 2 DIABETES MELLITUS WITH HYPERGLYCEMIA, WITH LONG-TERM CURRENT USE OF INSULIN (HCC): Primary | ICD-10-CM

## 2025-06-03 DIAGNOSIS — I87.8 VENOUS STASIS OF LOWER EXTREMITY: ICD-10-CM

## 2025-06-03 DIAGNOSIS — M79.671 PAIN IN RIGHT FOOT: ICD-10-CM

## 2025-06-03 PROCEDURE — 99242 OFF/OP CONSLTJ NEW/EST SF 20: CPT | Performed by: PODIATRIST

## 2025-06-03 NOTE — PROGRESS NOTES
"Name: Estefani Arenas      : 1985      MRN: [id number]  Encounter Provider: Adolfo Gonzalez DPM  Encounter Date: 6/3/2025   Encounter department: [facility] PODIATRY [place]  :  Assessment & Plan  Venous stasis of lower extremity       Recommended to the patient that she use a thick style of lotion on the discoloration fadia present on the lateral aspect of the right leg.   Recommended to the patient that she use some form of compression stocking to help keep excess edema away from this area.  Contusion of right foot, initial encounter       Suggested to the patient that she checked the shoes that she is normally wearing for any buildup of pattern material or stitch or anything it is not supposed to be they are putting pressure on the dorsal lateral aspect of the foot.  Xerosis of skin       Recommended to the patient that she use the lotion/cream that she got the prescription from her doctor for on the bottom of both feet.  Type 2 diabetes mellitus with hyperglycemia, with long-term current use of insulin ([facility])    Lab Results   Component Value Date    HGBA1C 14.2 (H) 2025            Pain in right foot         Return in about 4 weeks (around 2025).     History of Present Illness   HPI  Estefani Arenas is a 40 y.o. female who presents with chief complaint of itchy brownish fadia present on the outside part of her right leg.  Her family doctor had seen a lesion.  Patient's secondary complaint today is dry flaky skin present on the bottom of both feet.  She is a diabetic whose last A1c was 14.2 drawn on 2025.  History obtained from: patient    Review of Systems  Medical History Reviewed by provider this encounter:     .  Medications Ordered Prior to Encounter[1]   Social History[2]     Objective   Ht 5' 7\" (1.702 m)   Wt 99.3 kg (219 lb)   BMI 34.30 kg/m²      Physical Exam    Cardiovascular:      Pulses: no weak pulses.           Dorsalis pedis pulses are 1+ on the right side " and 1+ on the left side.        Posterior tibial pulses are 1+ on the right side and 1+ on the left side.   Feet:      Right foot:      Skin integrity: Dry skin present. No ulcer, skin breakdown, erythema, warmth or callus.      Left foot:      Skin integrity: Dry skin present. No ulcer, skin breakdown, erythema, warmth or callus.       Derm the lesion measures approximately 2 cm x 2 cm is firm not raised slightly scaly center.  The lesion is brownish in coloration circular slightly irregular borders.  There is a second fadia present on the dorsal aspect of the right foot over the cuboid cuneiform junctions along with the metatarsal.  There is no breakdown of the skin and the lesion blanches with pressure.  The lesion is non raised circular with no ink increase in temperature and no altered temperature is noted.  On the medial aspect of the left leg proximal to the ankle there is a small circular lesion of varicose veins starting with no discoloration centrally normal skin coloration is seen in all areas except where the varicose veins are.  There is white flaky tissue present on the plantar aspect of both feet with no signs of dried blood or fissures in the area.  The scaly tissue is mostly in the weightbearing areas of both feet.  There is no noticeable erythema present on either foot at the junction of the dorsal and plantar skin.    Vascular 2/4 DP 1/4 PT negative digital hair, normal distal cooling, immediate capillary refill with slight edema present bilaterally.  Capillary refill is approximately 2 to 3 seconds and the edema is barely +1 pitting.    Neuro light touch is intact and equal bilaterally and 0.5 monofilament test was performed and it was intact at all sites.  The sites that were tested were the plantar aspect of the hallux, plantar aspect of the 3rd and 4th toes, submet 3, and the plantar aspect of the arch.    Ortho mild hammertoe deformities are present on the fifth digits bilaterally.    Diabetic  "Foot Exam    Patient's shoes and socks removed.    Right Foot/Ankle   Right Foot Inspection  Skin Exam: dry skin and abnormal color. Skin not intact, no warmth, no callus, no erythema, no maceration, no pre-ulcer, no ulcer and no callus.     Toe Exam: right toe deformity. No swelling, no tenderness and erythema    Sensory   Vibration: intact  Monofilament testing: intact    Vascular  Capillary refills: < 3 seconds  The right DP pulse is 1+. The right PT pulse is 1+.     Left Foot/Ankle  Left Foot Inspection  Skin Exam: dry skin and abnormal color. Skin not intact, no warmth, no erythema, no maceration, no pre-ulcer, no ulcer and no callus.     Toe Exam: left toe deformity. No swelling, no tenderness and no erythema.     Sensory   Vibration: intact  Monofilament testing: intact    Vascular  Capillary refills: < 3 seconds  The left DP pulse is 1+. The left PT pulse is 1+.     Assign Risk Category  Deformity present  No loss of protective sensation  No weak pulses  Risk: 0           [1]   Current Outpatient Medications on File Prior to Visit   Medication Sig Dispense Refill    azithromycin (Zithromax) 250 mg tablet Take 2 tablets (500 mg total) by mouth daily for 1 day, THEN 1 tablet (250 mg total) daily for 4 days. 6 tablet 0    BD Veo Insulin Syringe U/F 31G X 15/64\" 0.3 ML MISC Inject under the skin 4 times a day 100 each 12    Blood Glucose Monitoring Suppl (ONE TOUCH ULTRA 2) w/Device KIT Use as directed      buprenorphine-naloxone (Suboxone) 8-2 mg One or two strips sl q day. 14 Film 0    fluticasone (FLONASE) 50 mcg/act nasal spray 2 sprays into each nostril daily 9.9 mL 5    glucose blood (OneTouch Ultra Blue Test) test strip Use 1 each 4 (four) times a day Use as instructed 360 each 1    insulin aspart (NovoLOG FlexPen) 100 UNIT/ML injection pen Inject 10 Units under the skin 3 (three) times a day with meals 15 mL 5    insulin glargine (Lantus) 100 units/mL subcutaneous injection Inject 10 Units under the skin " every 12 (twelve) hours 10 mL 2    Insulin Pen Needle 32G X 4 MM MISC Use 4 (four) times a day 100 each 11    Lancets (OneTouch Delica Plus Maxyzs25U) MISC       lisinopril (ZESTRIL) 10 mg tablet Take 1 tablet (10 mg total) by mouth daily 90 tablet 1    metFORMIN (GLUCOPHAGE) 500 mg tablet 2 PO am, 1 PO pm 90 tablet 5    naloxone (Narcan) 4 mg/0.1 mL nasal spray 0.1 mL (4 mg total) into each nostril as needed (respiratory depression or possible OD) 1 each 1    omeprazole (PriLOSEC) 20 mg delayed release capsule Take 1 capsule (20 mg total) by mouth daily 90 capsule 1     No current facility-administered medications on file prior to visit.   [2]   Social History  Tobacco Use    Smoking status: Every Day     Current packs/day: 0.50     Average packs/day: 0.5 packs/day for 5.4 years (2.7 ttl pk-yrs)     Types: Cigarettes     Start date: 2020     Passive exposure: Current    Smokeless tobacco: Never   Vaping Use    Vaping status: Never Used   Substance and Sexual Activity    Alcohol use: Never    Drug use: Not Currently     Types: Fentanyl, Heroin, Methamphetamines     Comment: 3 bags/day

## 2025-06-23 ENCOUNTER — OFFICE VISIT (OUTPATIENT)
Dept: FAMILY MEDICINE CLINIC | Facility: CLINIC | Age: 40
End: 2025-06-23
Payer: COMMERCIAL

## 2025-06-23 VITALS
OXYGEN SATURATION: 96 % | WEIGHT: 220.4 LBS | DIASTOLIC BLOOD PRESSURE: 68 MMHG | SYSTOLIC BLOOD PRESSURE: 128 MMHG | BODY MASS INDEX: 34.59 KG/M2 | TEMPERATURE: 97.8 F | HEART RATE: 82 BPM | HEIGHT: 67 IN

## 2025-06-23 DIAGNOSIS — F11.20 OPIOID USE DISORDER, SEVERE, DEPENDENCE (HCC): ICD-10-CM

## 2025-06-23 DIAGNOSIS — R21 RASH: Primary | ICD-10-CM

## 2025-06-23 DIAGNOSIS — Z79.4 TYPE 2 DIABETES MELLITUS WITH HYPERGLYCEMIA, WITH LONG-TERM CURRENT USE OF INSULIN (HCC): ICD-10-CM

## 2025-06-23 DIAGNOSIS — E11.65 TYPE 2 DIABETES MELLITUS WITH HYPERGLYCEMIA, WITH LONG-TERM CURRENT USE OF INSULIN (HCC): ICD-10-CM

## 2025-06-23 DIAGNOSIS — Z72.0 TOBACCO USE: ICD-10-CM

## 2025-06-23 DIAGNOSIS — Z12.31 ENCOUNTER FOR SCREENING MAMMOGRAM FOR BREAST CANCER: ICD-10-CM

## 2025-06-23 DIAGNOSIS — F19.10 IV DRUG ABUSE (HCC): ICD-10-CM

## 2025-06-23 DIAGNOSIS — I10 PRIMARY HYPERTENSION: ICD-10-CM

## 2025-06-23 LAB — SL AMB POCT HEMOGLOBIN AIC: 8 (ref ?–6.5)

## 2025-06-23 PROCEDURE — 99214 OFFICE O/P EST MOD 30 MIN: CPT | Performed by: FAMILY MEDICINE

## 2025-06-23 PROCEDURE — 83036 HEMOGLOBIN GLYCOSYLATED A1C: CPT | Performed by: FAMILY MEDICINE

## 2025-06-23 NOTE — PROGRESS NOTES
Name: Estefani Arenas      : 1985      MRN: [id number]  Encounter Provider: Marguerite Pierce DO  Encounter Date: 2025   Encounter department: [facility]  :  Assessment & Plan  Type 2 diabetes mellitus with hyperglycemia, with long-term current use of insulin (HCC)  Marked improvement in patient's A1c, 14.2-8.0.  She remains more compliant with her medication regimen at this time.  I would not change anything, update her eye exam, recheck in 3 months.  Obtain labs prior to office visit.  Lab Results   Component Value Date    HGBA1C 8.0 (A) 2025       Orders:  •  POCT hemoglobin A1c  •  Ambulatory Referral to Dermatology; Future  •  CBC and differential; Future  •  Comprehensive metabolic panel; Future  •  Lipid panel; Future  •  Albumin / creatinine urine ratio; Future  •  Hemoglobin A1C; Future    Encounter for screening mammogram for breast cancer    Orders:  •  Mammo screening bilateral w 3d and cad    Rash  Rash essentially unchanged, color seems to be deepening as time goes on.  Dermatology referral placed.  Orders:  •  Ambulatory Referral to Dermatology; Future    Primary hypertension  Stable on ACE inhibitor.       Tobacco use  Encouraged smoking cessation.        Opioid use disorder, severe, dependence (HCC)  Followed by addiction med.        IV drug abuse ([facility])               Tobacco Cessation Counseling: Tobacco cessation counseling was provided. The patient is sincerely urged to quit consumption of tobacco. She is not ready to quit tobacco.       History of Present Illness   Patient here for follow-up:     Rash persists.  Started with lesion RLE lateral to ankle, enlarging now, present x 6+ months per patient. Zhong, itching and hurts to scratch. Very sensitive, cannot wear cuffed pants. No fevers.  Then with smaller lesion R foot.   Area seems to be darkening in color, bit more red versus prior. We did refer to podiatry. He recommended derm  "referral.     Patient reports diabetes under much better control.  We added metformin back to her medical regimen, currently taking 1000 mg in the morning and 500 mg at night.  Taking 10 units basal insulin at bedtime.  Also with sliding scale NovoLog, 5 units with meals plus sliding scale according to  blood sugar. A1C improved today from 14+ to 8.0. She is overdue for an eye exam.           Review of Systems   Constitutional:  Negative for activity change, appetite change and fever.   HENT:  Negative for trouble swallowing.    Respiratory:  Negative for apnea, cough, chest tightness and shortness of breath.    Cardiovascular:  Negative for chest pain, palpitations and leg swelling.   Gastrointestinal:  Negative for abdominal pain.   Musculoskeletal:  Positive for arthralgias. Negative for back pain and gait problem.   Skin:  Positive for rash.   Neurological:  Negative for dizziness, weakness and light-headedness.       Objective   /68 (BP Location: Left arm, Patient Position: Sitting, Cuff Size: Large)   Pulse 82   Temp 97.8 °F (36.6 °C) (Tympanic)   Ht 5' 7\" (1.702 m)   Wt 100 kg (220 lb 6.4 oz)   SpO2 96%   BMI 34.52 kg/m²      Physical Exam  Vitals and nursing note reviewed.   Constitutional:       General: She is not in acute distress.     Appearance: Normal appearance. She is not ill-appearing.   HENT:      Head: Normocephalic.     Cardiovascular:      Rate and Rhythm: Normal rate and regular rhythm.      Heart sounds: Normal heart sounds. No murmur heard.  Pulmonary:      Effort: Pulmonary effort is normal. No respiratory distress.      Breath sounds: Normal breath sounds. No stridor. No wheezing.     Musculoskeletal:      Cervical back: Normal range of motion and neck supple. No rigidity or tenderness.   Lymphadenopathy:      Cervical: No cervical adenopathy.     Skin:     Capillary Refill: Capillary refill takes less than 2 seconds.      Comments: Sizes of lesions same as prior. Deep " erythematous color.      Neurological:      General: No focal deficit present.      Mental Status: She is alert and oriented to person, place, and time. Mental status is at baseline.      Cranial Nerves: No cranial nerve deficit.     Psychiatric:         Mood and Affect: Mood normal.         Thought Content: Thought content normal.         Judgment: Judgment normal.

## 2025-06-23 NOTE — ASSESSMENT & PLAN NOTE
Rash essentially unchanged, color seems to be deepening as time goes on.  Dermatology referral placed.  Orders:  •  Ambulatory Referral to Dermatology; Future

## 2025-06-23 NOTE — ASSESSMENT & PLAN NOTE
Marked improvement in patient's A1c, 14.2-8.0.  She remains more compliant with her medication regimen at this time.  I would not change anything, update her eye exam, recheck in 3 months.  Obtain labs prior to office visit.  Lab Results   Component Value Date    HGBA1C 8.0 (A) 06/23/2025       Orders:  •  POCT hemoglobin A1c  •  Ambulatory Referral to Dermatology; Future  •  CBC and differential; Future  •  Comprehensive metabolic panel; Future  •  Lipid panel; Future  •  Albumin / creatinine urine ratio; Future  •  Hemoglobin A1C; Future

## 2025-06-25 ENCOUNTER — TELEPHONE (OUTPATIENT)
Dept: ADMINISTRATIVE | Facility: OTHER | Age: 40
End: 2025-06-25

## 2025-06-25 NOTE — TELEPHONE ENCOUNTER
----- Message from Stephanie WERNER sent at 6/24/2025  9:47 AM EDT -----  Regarding: DMI  06/24/25 9:47 AM    Hello, our patient Estefani Arenas has had Diabetic Eye Exam completed/performed. Please assist in updating the patient chart by pulling the document from the Media Tab. The date of service is 12/12/23.     Thank you,  [facility]

## 2025-06-25 NOTE — TELEPHONE ENCOUNTER
Upon review of the In Basket request we were able to locate, review, and update the patient chart as requested for Diabetic Eye Exam.    Any additional questions or concerns should be emailed to the Practice Liaisons via the appropriate education email address, please do not reply via In Basket.    Thank you  Milagro Pereira MA   [facility]

## 2025-06-27 ENCOUNTER — HOSPITAL ENCOUNTER (OUTPATIENT)
Dept: MAMMOGRAPHY | Facility: HOSPITAL | Age: 40
End: 2025-06-27
Attending: FAMILY MEDICINE
Payer: COMMERCIAL

## 2025-06-27 PROCEDURE — 77063 BREAST TOMOSYNTHESIS BI: CPT

## 2025-06-27 PROCEDURE — 77067 SCR MAMMO BI INCL CAD: CPT

## 2025-07-09 DIAGNOSIS — Z79.4 TYPE 2 DIABETES MELLITUS WITH HYPERGLYCEMIA, WITH LONG-TERM CURRENT USE OF INSULIN (HCC): ICD-10-CM

## 2025-07-09 DIAGNOSIS — E11.65 UNCONTROLLED TYPE 2 DIABETES MELLITUS WITH HYPERGLYCEMIA (HCC): Primary | ICD-10-CM

## 2025-07-09 DIAGNOSIS — E11.65 TYPE 2 DIABETES MELLITUS WITH HYPERGLYCEMIA, WITH LONG-TERM CURRENT USE OF INSULIN (HCC): ICD-10-CM

## 2025-07-09 NOTE — TELEPHONE ENCOUNTER
Patient called back asking for an update. She states she needs the script sent soon, she is leaving for vacation tomorrow

## 2025-07-09 NOTE — TELEPHONE ENCOUNTER
Left message for patient to return call. We can order these for her but just want to clarify the brand she wants is ACCUCHECK GUIDE? This does not come up in our system so just want to make sure accurate. Also where am I sending these scripts? [facility] [place]? It did not specify in note.

## 2025-07-09 NOTE — TELEPHONE ENCOUNTER
Patient was notified by the pharmacy and her insurance that her   glucose blood (OneTouch Ultra Blue Test) test strip is not covered by her insurance. Insurance is requesting a script for ACCUCHECK GUIDE kit to be sent. Patient says she will need the entire kit, lancets, and strips. Patient reports she is all out of her glucose supplies and leaves on vacation tomorrow. Please advise.

## 2025-07-10 NOTE — TELEPHONE ENCOUNTER
Pt called back, she is leaving today for vacation and is out of test strips. Now insurance will not cover the previous ones.  she did verify machine was Accucheck guide.I called [facility], had to leave a vm

## 2025-07-10 NOTE — TELEPHONE ENCOUNTER
Patient called and stated that she was told by pharmacy that insurance letter stated that it has to be ACCUCHECK Guide Kit and the pharmacy is [facility] in [place]. A warm transfer to the office was made and Billie took the call to further assist.

## 2025-07-11 RX ORDER — BLOOD-GLUCOSE METER
1 EACH MISCELLANEOUS 4 TIMES DAILY
Qty: 1 EACH | Refills: 1 | Status: SHIPPED | OUTPATIENT
Start: 2025-07-11

## 2025-07-11 RX ORDER — BLOOD-GLUCOSE METER
EACH MISCELLANEOUS
COMMUNITY
End: 2025-07-11 | Stop reason: SDUPTHER

## 2025-07-11 RX ORDER — BLOOD SUGAR DIAGNOSTIC
1 STRIP MISCELLANEOUS 4 TIMES DAILY
Qty: 100 STRIP | Refills: 12 | Status: SHIPPED | OUTPATIENT
Start: 2025-07-11

## 2025-07-11 NOTE — TELEPHONE ENCOUNTER
Spoke to [facility], this is how this needs to be ordered, I put in the comments what device to dispense

## 2025-07-16 ENCOUNTER — VBI (OUTPATIENT)
Dept: ADMINISTRATIVE | Facility: OTHER | Age: 40
End: 2025-07-16

## 2025-07-16 NOTE — TELEPHONE ENCOUNTER
07/16/25 8:43 AM     Chart reviewed for Diabetic Eye Exam ; nothing is submitted to the patient's insurance at this time.     Hodan Silverman   [facility]

## (undated) DEVICE — PACK PBDS LAP CHOLE RF

## (undated) DEVICE — ENDOPATH XCEL BLADELESS TROCARS WITH STABILITY SLEEVES: Brand: ENDOPATH XCEL

## (undated) DEVICE — ENDOPATH 5 MM GRASPERS WITH RATCHET HANDLES: Brand: ENDOPATH

## (undated) DEVICE — ADHESIVE SKIN HIGH VISCOSITY EXOFIN 1ML

## (undated) DEVICE — HARMONIC 1100 SHEARS, 36CM SHAFT LENGTH: Brand: HARMONIC

## (undated) DEVICE — GLOVE INDICATOR PI UNDERGLOVE SZ 7.5 BLUE

## (undated) DEVICE — 5 MM CURVED DISSECTORS WITH MONOPOLAR CAUTERY: Brand: ENDOPATH

## (undated) DEVICE — TROCAR: Brand: KII FIOS FIRST ENTRY

## (undated) DEVICE — CHLORAPREP HI-LITE 26ML ORANGE

## (undated) DEVICE — SURGICAL CLIPPER BLADE GENERAL USE

## (undated) DEVICE — TUBING SMOKE EVAC W/FILTRATION DEVICE PLUMEPORT ACTIV

## (undated) DEVICE — GLOVE SRG BIOGEL 7.5

## (undated) DEVICE — INTENDED FOR TISSUE SEPARATION, AND OTHER PROCEDURES THAT REQUIRE A SHARP SURGICAL BLADE TO PUNCTURE OR CUT.: Brand: BARD-PARKER ® CARBON RIB-BACK BLADES

## (undated) DEVICE — SUT VICRYL 0 UR-6 27 IN J603H

## (undated) DEVICE — GARMENT,MEDLINE,DVT,INT,CALF,FOAM,MED: Brand: MEDLINE

## (undated) DEVICE — SUT MONOCRYL 4-0 PS-2 27 IN Y426H

## (undated) DEVICE — BINDER ABDOMINAL 12 X 45IN 4 PANEL UNIVERSAL UNISEX

## (undated) DEVICE — GLOVE SRG BIOGEL 7

## (undated) DEVICE — CLOSURE DEVICE ENDO CLOSE

## (undated) DEVICE — 4-PORT MANIFOLD: Brand: NEPTUNE 2

## (undated) DEVICE — NEEDLE SPINAL 22G X 1 1/2

## (undated) DEVICE — NEEDLE 25G X 1 1/2